# Patient Record
Sex: FEMALE | Race: BLACK OR AFRICAN AMERICAN | NOT HISPANIC OR LATINO | Employment: OTHER | URBAN - METROPOLITAN AREA
[De-identification: names, ages, dates, MRNs, and addresses within clinical notes are randomized per-mention and may not be internally consistent; named-entity substitution may affect disease eponyms.]

---

## 2018-01-30 ENCOUNTER — CONVERSION ENCOUNTER (OUTPATIENT)
Dept: PODIATRY | Facility: CLINIC | Age: 83
End: 2018-01-30

## 2018-01-30 ENCOUNTER — PROCEDURE VISIT CONVERTED (OUTPATIENT)
Dept: PODIATRY | Facility: CLINIC | Age: 83
End: 2018-01-30
Attending: PODIATRIST

## 2018-01-30 ENCOUNTER — OFFICE VISIT CONVERTED (OUTPATIENT)
Dept: PULMONOLOGY | Facility: CLINIC | Age: 83
End: 2018-01-30
Attending: INTERNAL MEDICINE

## 2018-02-13 ENCOUNTER — CONVERSION ENCOUNTER (OUTPATIENT)
Dept: PODIATRY | Facility: CLINIC | Age: 83
End: 2018-02-13

## 2018-02-13 ENCOUNTER — OFFICE VISIT CONVERTED (OUTPATIENT)
Dept: PODIATRY | Facility: CLINIC | Age: 83
End: 2018-02-13
Attending: PODIATRIST

## 2018-02-28 ENCOUNTER — OFFICE VISIT CONVERTED (OUTPATIENT)
Dept: PULMONOLOGY | Facility: CLINIC | Age: 83
End: 2018-02-28
Attending: INTERNAL MEDICINE

## 2018-03-06 ENCOUNTER — PROCEDURE VISIT CONVERTED (OUTPATIENT)
Dept: PODIATRY | Facility: CLINIC | Age: 83
End: 2018-03-06
Attending: PODIATRIST

## 2018-04-13 ENCOUNTER — OFFICE VISIT CONVERTED (OUTPATIENT)
Dept: FAMILY MEDICINE CLINIC | Facility: CLINIC | Age: 83
End: 2018-04-13
Attending: FAMILY MEDICINE

## 2018-04-17 ENCOUNTER — OFFICE VISIT CONVERTED (OUTPATIENT)
Dept: PULMONOLOGY | Facility: CLINIC | Age: 83
End: 2018-04-17
Attending: INTERNAL MEDICINE

## 2018-04-24 ENCOUNTER — PROCEDURE VISIT CONVERTED (OUTPATIENT)
Dept: PODIATRY | Facility: CLINIC | Age: 83
End: 2018-04-24
Attending: PODIATRIST

## 2018-05-14 ENCOUNTER — OFFICE VISIT CONVERTED (OUTPATIENT)
Dept: FAMILY MEDICINE CLINIC | Facility: CLINIC | Age: 83
End: 2018-05-14
Attending: FAMILY MEDICINE

## 2018-05-14 ENCOUNTER — CONVERSION ENCOUNTER (OUTPATIENT)
Dept: FAMILY MEDICINE CLINIC | Facility: CLINIC | Age: 83
End: 2018-05-14

## 2018-05-25 ENCOUNTER — OFFICE VISIT CONVERTED (OUTPATIENT)
Dept: FAMILY MEDICINE CLINIC | Facility: CLINIC | Age: 83
End: 2018-05-25
Attending: FAMILY MEDICINE

## 2018-06-05 ENCOUNTER — PROCEDURE VISIT CONVERTED (OUTPATIENT)
Dept: PODIATRY | Facility: CLINIC | Age: 83
End: 2018-06-05
Attending: PODIATRIST

## 2018-07-19 ENCOUNTER — OFFICE VISIT CONVERTED (OUTPATIENT)
Dept: PULMONOLOGY | Facility: CLINIC | Age: 83
End: 2018-07-19
Attending: INTERNAL MEDICINE

## 2018-07-23 ENCOUNTER — PROCEDURE VISIT CONVERTED (OUTPATIENT)
Dept: PODIATRY | Facility: CLINIC | Age: 83
End: 2018-07-23
Attending: PODIATRIST

## 2018-08-21 ENCOUNTER — OFFICE VISIT CONVERTED (OUTPATIENT)
Dept: FAMILY MEDICINE CLINIC | Facility: CLINIC | Age: 83
End: 2018-08-21
Attending: FAMILY MEDICINE

## 2018-08-27 ENCOUNTER — CONVERSION ENCOUNTER (OUTPATIENT)
Dept: PODIATRY | Facility: CLINIC | Age: 83
End: 2018-08-27

## 2018-08-27 ENCOUNTER — PROCEDURE VISIT CONVERTED (OUTPATIENT)
Dept: PODIATRY | Facility: CLINIC | Age: 83
End: 2018-08-27
Attending: PODIATRIST

## 2018-08-28 ENCOUNTER — OFFICE VISIT CONVERTED (OUTPATIENT)
Dept: FAMILY MEDICINE CLINIC | Facility: CLINIC | Age: 83
End: 2018-08-28
Attending: FAMILY MEDICINE

## 2018-09-14 ENCOUNTER — OFFICE VISIT CONVERTED (OUTPATIENT)
Dept: FAMILY MEDICINE CLINIC | Facility: CLINIC | Age: 83
End: 2018-09-14
Attending: FAMILY MEDICINE

## 2018-10-02 ENCOUNTER — OFFICE VISIT CONVERTED (OUTPATIENT)
Dept: PULMONOLOGY | Facility: CLINIC | Age: 83
End: 2018-10-02
Attending: INTERNAL MEDICINE

## 2018-10-10 ENCOUNTER — CONVERSION ENCOUNTER (OUTPATIENT)
Dept: FAMILY MEDICINE CLINIC | Facility: CLINIC | Age: 83
End: 2018-10-10

## 2018-10-10 ENCOUNTER — OFFICE VISIT CONVERTED (OUTPATIENT)
Dept: FAMILY MEDICINE CLINIC | Facility: CLINIC | Age: 83
End: 2018-10-10
Attending: FAMILY MEDICINE

## 2018-10-15 ENCOUNTER — OFFICE VISIT CONVERTED (OUTPATIENT)
Dept: PODIATRY | Facility: CLINIC | Age: 83
End: 2018-10-15
Attending: PODIATRIST

## 2018-11-09 ENCOUNTER — OFFICE VISIT CONVERTED (OUTPATIENT)
Dept: FAMILY MEDICINE CLINIC | Facility: CLINIC | Age: 83
End: 2018-11-09
Attending: FAMILY MEDICINE

## 2018-11-16 ENCOUNTER — OFFICE VISIT CONVERTED (OUTPATIENT)
Dept: FAMILY MEDICINE CLINIC | Facility: CLINIC | Age: 83
End: 2018-11-16
Attending: FAMILY MEDICINE

## 2018-11-19 ENCOUNTER — PROCEDURE VISIT CONVERTED (OUTPATIENT)
Dept: PODIATRY | Facility: CLINIC | Age: 83
End: 2018-11-19
Attending: PODIATRIST

## 2018-12-10 ENCOUNTER — OFFICE VISIT CONVERTED (OUTPATIENT)
Dept: FAMILY MEDICINE CLINIC | Facility: CLINIC | Age: 83
End: 2018-12-10
Attending: FAMILY MEDICINE

## 2018-12-10 ENCOUNTER — CONVERSION ENCOUNTER (OUTPATIENT)
Dept: FAMILY MEDICINE CLINIC | Facility: CLINIC | Age: 83
End: 2018-12-10

## 2019-01-02 ENCOUNTER — OFFICE VISIT CONVERTED (OUTPATIENT)
Dept: PULMONOLOGY | Facility: CLINIC | Age: 84
End: 2019-01-02
Attending: INTERNAL MEDICINE

## 2019-01-14 ENCOUNTER — HOSPITAL ENCOUNTER (OUTPATIENT)
Dept: FAMILY MEDICINE CLINIC | Facility: CLINIC | Age: 84
Discharge: HOME | End: 2019-01-14

## 2019-01-14 ENCOUNTER — OFFICE VISIT CONVERTED (OUTPATIENT)
Dept: FAMILY MEDICINE CLINIC | Facility: CLINIC | Age: 84
End: 2019-01-14
Attending: FAMILY MEDICINE

## 2019-01-14 ENCOUNTER — CONVERSION ENCOUNTER (OUTPATIENT)
Dept: FAMILY MEDICINE CLINIC | Facility: CLINIC | Age: 84
End: 2019-01-14

## 2019-01-14 LAB
ALBUMIN SERPL-MCNC: 3.6 G/DL (ref 3.5–5)
ALBUMIN/GLOB SERPL: 1.2 {RATIO} (ref 1.4–2.6)
ALP SERPL-CCNC: 82 U/L (ref 38–185)
ALT SERPL-CCNC: 15 U/L (ref 10–40)
ANION GAP SERPL CALC-SCNC: 18 MMOL/L (ref 8–19)
AST SERPL-CCNC: 16 U/L (ref 15–50)
BILIRUB SERPL-MCNC: 0.22 MG/DL (ref 0.2–1.3)
BNP SERPL-MCNC: 1394 PG/ML (ref 0–1800)
BUN SERPL-MCNC: 55 MG/DL (ref 5–25)
BUN/CREAT SERPL: 28 {RATIO} (ref 6–20)
CALCIUM SERPL-MCNC: 9.2 MG/DL (ref 8.7–10.4)
CHLORIDE SERPL-SCNC: 105 MMOL/L (ref 99–111)
CONV CO2: 28 MMOL/L (ref 22–32)
CONV TOTAL PROTEIN: 6.5 G/DL (ref 6.3–8.2)
CREAT UR-MCNC: 1.97 MG/DL (ref 0.5–0.9)
GFR SERPLBLD BASED ON 1.73 SQ M-ARVRAT: 25 ML/MIN/{1.73_M2}
GLOBULIN UR ELPH-MCNC: 2.9 G/DL (ref 2–3.5)
GLUCOSE SERPL-MCNC: 79 MG/DL (ref 65–99)
OSMOLALITY SERPL CALC.SUM OF ELEC: 316 MOSM/KG (ref 273–304)
POTASSIUM SERPL-SCNC: 5 MMOL/L (ref 3.5–5.3)
SODIUM SERPL-SCNC: 146 MMOL/L (ref 135–147)

## 2019-01-17 ENCOUNTER — PROCEDURE VISIT CONVERTED (OUTPATIENT)
Dept: PODIATRY | Facility: CLINIC | Age: 84
End: 2019-01-17
Attending: PODIATRIST

## 2019-01-23 ENCOUNTER — OFFICE VISIT CONVERTED (OUTPATIENT)
Dept: PULMONOLOGY | Facility: CLINIC | Age: 84
End: 2019-01-23
Attending: INTERNAL MEDICINE

## 2019-02-15 ENCOUNTER — OFFICE VISIT CONVERTED (OUTPATIENT)
Dept: FAMILY MEDICINE CLINIC | Facility: CLINIC | Age: 84
End: 2019-02-15
Attending: FAMILY MEDICINE

## 2019-02-15 ENCOUNTER — CONVERSION ENCOUNTER (OUTPATIENT)
Dept: FAMILY MEDICINE CLINIC | Facility: CLINIC | Age: 84
End: 2019-02-15

## 2019-02-18 ENCOUNTER — PROCEDURE VISIT CONVERTED (OUTPATIENT)
Dept: PODIATRY | Facility: CLINIC | Age: 84
End: 2019-02-18
Attending: PODIATRIST

## 2019-02-20 ENCOUNTER — HOSPITAL ENCOUNTER (OUTPATIENT)
Dept: OTHER | Facility: HOSPITAL | Age: 84
Discharge: HOME | End: 2019-02-20
Attending: SPECIALIST

## 2019-02-20 LAB
ANION GAP SERPL CALC-SCNC: 20 MMOL/L (ref 8–19)
BUN SERPL-MCNC: 37 MG/DL (ref 5–25)
BUN/CREAT SERPL: 24 {RATIO} (ref 6–20)
CALCIUM SERPL-MCNC: 9.6 MG/DL (ref 8.7–10.4)
CHLORIDE SERPL-SCNC: 106 MMOL/L (ref 99–111)
CONV CO2: 24 MMOL/L (ref 22–32)
CREAT UR-MCNC: 1.54 MG/DL (ref 0.5–0.9)
GFR SERPLBLD BASED ON 1.73 SQ M-ARVRAT: 33 ML/MIN/{1.73_M2}
GLUCOSE SERPL-MCNC: 131 MG/DL (ref 65–99)
OSMOLALITY SERPL CALC.SUM OF ELEC: 312 MOSM/KG (ref 273–304)
POTASSIUM SERPL-SCNC: 4.4 MMOL/L (ref 3.5–5.3)
SODIUM SERPL-SCNC: 146 MMOL/L (ref 135–147)

## 2019-03-01 ENCOUNTER — OFFICE VISIT CONVERTED (OUTPATIENT)
Dept: FAMILY MEDICINE CLINIC | Facility: CLINIC | Age: 84
End: 2019-03-01
Attending: FAMILY MEDICINE

## 2019-03-15 ENCOUNTER — CONVERSION ENCOUNTER (OUTPATIENT)
Dept: FAMILY MEDICINE CLINIC | Facility: CLINIC | Age: 84
End: 2019-03-15

## 2019-03-15 ENCOUNTER — OFFICE VISIT CONVERTED (OUTPATIENT)
Dept: FAMILY MEDICINE CLINIC | Facility: CLINIC | Age: 84
End: 2019-03-15
Attending: FAMILY MEDICINE

## 2019-03-15 ENCOUNTER — HOSPITAL ENCOUNTER (OUTPATIENT)
Dept: FAMILY MEDICINE CLINIC | Facility: CLINIC | Age: 84
Discharge: HOME | End: 2019-03-15

## 2019-03-15 LAB
ALBUMIN SERPL-MCNC: 4 G/DL (ref 3.5–5)
ALBUMIN/GLOB SERPL: 1.4 {RATIO} (ref 1.4–2.6)
ALP SERPL-CCNC: 94 U/L (ref 38–185)
ALT SERPL-CCNC: 9 U/L (ref 10–40)
ANION GAP SERPL CALC-SCNC: 19 MMOL/L (ref 8–19)
AST SERPL-CCNC: 14 U/L (ref 15–50)
BILIRUB SERPL-MCNC: 0.34 MG/DL (ref 0.2–1.3)
BUN SERPL-MCNC: 46 MG/DL (ref 5–25)
BUN/CREAT SERPL: 29 {RATIO} (ref 6–20)
CALCIUM SERPL-MCNC: 9.5 MG/DL (ref 8.7–10.4)
CHLORIDE SERPL-SCNC: 105 MMOL/L (ref 99–111)
CONV CO2: 26 MMOL/L (ref 22–32)
CONV CREATININE URINE, RANDOM: 51.1 MG/DL (ref 10–300)
CONV MICROALBUM.,U,RANDOM: <12 MG/L (ref 0–20)
CONV TOTAL PROTEIN: 6.9 G/DL (ref 6.3–8.2)
CREAT UR-MCNC: 1.61 MG/DL (ref 0.5–0.9)
GFR SERPLBLD BASED ON 1.73 SQ M-ARVRAT: 31 ML/MIN/{1.73_M2}
GLOBULIN UR ELPH-MCNC: 2.9 G/DL (ref 2–3.5)
GLUCOSE SERPL-MCNC: 127 MG/DL (ref 65–99)
MICROALBUMIN/CREAT UR: 23.5 MG/G{CRE} (ref 0–35)
OSMOLALITY SERPL CALC.SUM OF ELEC: 315 MOSM/KG (ref 273–304)
POTASSIUM SERPL-SCNC: 4.1 MMOL/L (ref 3.5–5.3)
SODIUM SERPL-SCNC: 146 MMOL/L (ref 135–147)

## 2019-03-26 ENCOUNTER — OFFICE VISIT CONVERTED (OUTPATIENT)
Dept: FAMILY MEDICINE CLINIC | Facility: CLINIC | Age: 84
End: 2019-03-26
Attending: FAMILY MEDICINE

## 2019-03-27 ENCOUNTER — OFFICE VISIT CONVERTED (OUTPATIENT)
Dept: PULMONOLOGY | Facility: CLINIC | Age: 84
End: 2019-03-27
Attending: INTERNAL MEDICINE

## 2019-04-01 ENCOUNTER — PROCEDURE VISIT CONVERTED (OUTPATIENT)
Dept: PODIATRY | Facility: CLINIC | Age: 84
End: 2019-04-01
Attending: PODIATRIST

## 2019-04-29 ENCOUNTER — PROCEDURE VISIT CONVERTED (OUTPATIENT)
Dept: PODIATRY | Facility: CLINIC | Age: 84
End: 2019-04-29
Attending: PODIATRIST

## 2019-05-07 ENCOUNTER — OFFICE VISIT CONVERTED (OUTPATIENT)
Dept: FAMILY MEDICINE CLINIC | Facility: CLINIC | Age: 84
End: 2019-05-07
Attending: FAMILY MEDICINE

## 2019-06-03 ENCOUNTER — PROCEDURE VISIT CONVERTED (OUTPATIENT)
Dept: PODIATRY | Facility: CLINIC | Age: 84
End: 2019-06-03
Attending: PODIATRIST

## 2019-06-03 ENCOUNTER — CONVERSION ENCOUNTER (OUTPATIENT)
Dept: PODIATRY | Facility: CLINIC | Age: 84
End: 2019-06-03

## 2019-06-03 ENCOUNTER — OFFICE VISIT CONVERTED (OUTPATIENT)
Dept: FAMILY MEDICINE CLINIC | Facility: CLINIC | Age: 84
End: 2019-06-03
Attending: FAMILY MEDICINE

## 2019-06-12 ENCOUNTER — OFFICE VISIT CONVERTED (OUTPATIENT)
Dept: PULMONOLOGY | Facility: CLINIC | Age: 84
End: 2019-06-12
Attending: INTERNAL MEDICINE

## 2019-07-08 ENCOUNTER — PROCEDURE VISIT CONVERTED (OUTPATIENT)
Dept: PODIATRY | Facility: CLINIC | Age: 84
End: 2019-07-08
Attending: PODIATRIST

## 2019-07-16 ENCOUNTER — CONVERSION ENCOUNTER (OUTPATIENT)
Dept: FAMILY MEDICINE CLINIC | Facility: CLINIC | Age: 84
End: 2019-07-16

## 2019-07-16 ENCOUNTER — OFFICE VISIT CONVERTED (OUTPATIENT)
Dept: FAMILY MEDICINE CLINIC | Facility: CLINIC | Age: 84
End: 2019-07-16
Attending: FAMILY MEDICINE

## 2019-07-16 ENCOUNTER — HOSPITAL ENCOUNTER (OUTPATIENT)
Dept: FAMILY MEDICINE CLINIC | Facility: CLINIC | Age: 84
Discharge: HOME | End: 2019-07-16

## 2019-07-16 LAB
ALBUMIN SERPL-MCNC: 4 G/DL (ref 3.5–5)
ALBUMIN/GLOB SERPL: 1.5 {RATIO} (ref 1.4–2.6)
ALP SERPL-CCNC: 97 U/L (ref 38–185)
ALT SERPL-CCNC: 12 U/L (ref 10–40)
ANION GAP SERPL CALC-SCNC: 25 MMOL/L (ref 8–19)
APPEARANCE UR: CLEAR
AST SERPL-CCNC: 17 U/L (ref 15–50)
BILIRUB SERPL-MCNC: 0.51 MG/DL (ref 0.2–1.3)
BILIRUB UR QL: NEGATIVE
BUN SERPL-MCNC: 53 MG/DL (ref 5–25)
BUN/CREAT SERPL: 32 {RATIO} (ref 6–20)
CALCIUM SERPL-MCNC: 9.2 MG/DL (ref 8.7–10.4)
CHLORIDE SERPL-SCNC: 105 MMOL/L (ref 99–111)
COLOR UR: YELLOW
CONV BACTERIA: NEGATIVE
CONV CO2: 21 MMOL/L (ref 22–32)
CONV COLLECTION SOURCE (UA): ABNORMAL
CONV TOTAL PROTEIN: 6.7 G/DL (ref 6.3–8.2)
CONV UROBILINOGEN IN URINE BY AUTOMATED TEST STRIP: 0.2 {EHRLICHU}/DL (ref 0.1–1)
CREAT UR-MCNC: 1.67 MG/DL (ref 0.5–0.9)
EST. AVERAGE GLUCOSE BLD GHB EST-MCNC: 146 MG/DL
GFR SERPLBLD BASED ON 1.73 SQ M-ARVRAT: 30 ML/MIN/{1.73_M2}
GLOBULIN UR ELPH-MCNC: 2.7 G/DL (ref 2–3.5)
GLUCOSE SERPL-MCNC: 213 MG/DL (ref 65–99)
GLUCOSE UR QL: NEGATIVE MG/DL
HBA1C MFR BLD: 6.7 % (ref 3.5–5.7)
HGB UR QL STRIP: NEGATIVE
KETONES UR QL STRIP: NEGATIVE MG/DL
LEUKOCYTE ESTERASE UR QL STRIP: ABNORMAL
NITRITE UR QL STRIP: NEGATIVE
OSMOLALITY SERPL CALC.SUM OF ELEC: 325 MOSM/KG (ref 273–304)
PH UR STRIP.AUTO: 5 [PH] (ref 5–8)
POTASSIUM SERPL-SCNC: 4 MMOL/L (ref 3.5–5.3)
PROT UR QL: NEGATIVE MG/DL
RBC #/AREA URNS HPF: ABNORMAL /[HPF]
SODIUM SERPL-SCNC: 147 MMOL/L (ref 135–147)
SP GR UR: 1.02 (ref 1–1.03)
SQUAMOUS SPT QL MICRO: ABNORMAL /[HPF]
WBC #/AREA URNS HPF: ABNORMAL /[HPF]

## 2019-08-12 ENCOUNTER — PROCEDURE VISIT CONVERTED (OUTPATIENT)
Dept: PODIATRY | Facility: CLINIC | Age: 84
End: 2019-08-12
Attending: PODIATRIST

## 2019-08-19 ENCOUNTER — HOSPITAL ENCOUNTER (OUTPATIENT)
Dept: FAMILY MEDICINE CLINIC | Facility: CLINIC | Age: 84
Discharge: HOME | End: 2019-08-19

## 2019-08-19 ENCOUNTER — OFFICE VISIT CONVERTED (OUTPATIENT)
Dept: FAMILY MEDICINE CLINIC | Facility: CLINIC | Age: 84
End: 2019-08-19
Attending: FAMILY MEDICINE

## 2019-08-19 LAB
ALBUMIN SERPL-MCNC: 3.9 G/DL (ref 3.5–5)
ALBUMIN/GLOB SERPL: 1.5 {RATIO} (ref 1.4–2.6)
ALP SERPL-CCNC: 88 U/L (ref 38–185)
ALT SERPL-CCNC: 10 U/L (ref 10–40)
ANION GAP SERPL CALC-SCNC: 18 MMOL/L (ref 8–19)
AST SERPL-CCNC: 15 U/L (ref 15–50)
BILIRUB SERPL-MCNC: 0.25 MG/DL (ref 0.2–1.3)
BUN SERPL-MCNC: 63 MG/DL (ref 5–25)
BUN/CREAT SERPL: 40 {RATIO} (ref 6–20)
CALCIUM SERPL-MCNC: 8.9 MG/DL (ref 8.7–10.4)
CHLORIDE SERPL-SCNC: 105 MMOL/L (ref 99–111)
CHOLEST SERPL-MCNC: 183 MG/DL (ref 107–200)
CHOLEST/HDLC SERPL: 3.1 {RATIO} (ref 3–6)
CONV CO2: 24 MMOL/L (ref 22–32)
CONV TOTAL PROTEIN: 6.5 G/DL (ref 6.3–8.2)
CREAT UR-MCNC: 1.57 MG/DL (ref 0.5–0.9)
GFR SERPLBLD BASED ON 1.73 SQ M-ARVRAT: 32 ML/MIN/{1.73_M2}
GLOBULIN UR ELPH-MCNC: 2.6 G/DL (ref 2–3.5)
GLUCOSE SERPL-MCNC: 82 MG/DL (ref 65–99)
HDLC SERPL-MCNC: 60 MG/DL (ref 40–60)
LDLC SERPL CALC-MCNC: 102 MG/DL (ref 70–100)
OSMOLALITY SERPL CALC.SUM OF ELEC: 313 MOSM/KG (ref 273–304)
POTASSIUM SERPL-SCNC: 4.1 MMOL/L (ref 3.5–5.3)
SODIUM SERPL-SCNC: 143 MMOL/L (ref 135–147)
TRIGL SERPL-MCNC: 104 MG/DL (ref 40–150)
URATE SERPL-MCNC: 4.2 MG/DL (ref 2.5–7.5)
VLDLC SERPL-MCNC: 21 MG/DL (ref 5–37)

## 2019-09-09 ENCOUNTER — PROCEDURE VISIT CONVERTED (OUTPATIENT)
Dept: PODIATRY | Facility: CLINIC | Age: 84
End: 2019-09-09
Attending: PODIATRIST

## 2019-09-27 ENCOUNTER — OFFICE VISIT CONVERTED (OUTPATIENT)
Dept: FAMILY MEDICINE CLINIC | Facility: CLINIC | Age: 84
End: 2019-09-27
Attending: FAMILY MEDICINE

## 2019-10-28 ENCOUNTER — CONVERSION ENCOUNTER (OUTPATIENT)
Dept: PODIATRY | Facility: CLINIC | Age: 84
End: 2019-10-28

## 2019-10-28 ENCOUNTER — PROCEDURE VISIT CONVERTED (OUTPATIENT)
Dept: PODIATRY | Facility: CLINIC | Age: 84
End: 2019-10-28
Attending: PODIATRIST

## 2019-10-30 ENCOUNTER — CONVERSION ENCOUNTER (OUTPATIENT)
Dept: FAMILY MEDICINE CLINIC | Facility: CLINIC | Age: 84
End: 2019-10-30

## 2019-10-30 ENCOUNTER — OFFICE VISIT CONVERTED (OUTPATIENT)
Dept: FAMILY MEDICINE CLINIC | Facility: CLINIC | Age: 84
End: 2019-10-30
Attending: FAMILY MEDICINE

## 2019-10-30 ENCOUNTER — OFFICE VISIT CONVERTED (OUTPATIENT)
Dept: PULMONOLOGY | Facility: CLINIC | Age: 84
End: 2019-10-30
Attending: INTERNAL MEDICINE

## 2019-10-30 ENCOUNTER — HOSPITAL ENCOUNTER (OUTPATIENT)
Dept: FAMILY MEDICINE CLINIC | Facility: CLINIC | Age: 84
Discharge: HOME | End: 2019-10-30
Attending: FAMILY MEDICINE

## 2019-10-30 LAB
EST. AVERAGE GLUCOSE BLD GHB EST-MCNC: 140 MG/DL
HBA1C MFR BLD: 6.5 % (ref 3.5–5.7)

## 2020-03-12 ENCOUNTER — PROCEDURE VISIT CONVERTED (OUTPATIENT)
Dept: PODIATRY | Facility: CLINIC | Age: 85
End: 2020-03-12
Attending: PODIATRIST

## 2020-03-12 ENCOUNTER — CONVERSION ENCOUNTER (OUTPATIENT)
Dept: PODIATRY | Facility: CLINIC | Age: 85
End: 2020-03-12

## 2020-03-16 ENCOUNTER — OFFICE VISIT CONVERTED (OUTPATIENT)
Dept: FAMILY MEDICINE CLINIC | Facility: CLINIC | Age: 85
End: 2020-03-16
Attending: FAMILY MEDICINE

## 2020-03-16 ENCOUNTER — HOSPITAL ENCOUNTER (OUTPATIENT)
Dept: FAMILY MEDICINE CLINIC | Facility: CLINIC | Age: 85
Discharge: HOME | End: 2020-03-16
Attending: FAMILY MEDICINE

## 2020-03-17 ENCOUNTER — HOSPITAL ENCOUNTER (OUTPATIENT)
Dept: FAMILY MEDICINE CLINIC | Facility: CLINIC | Age: 85
Discharge: HOME | End: 2020-03-17
Attending: FAMILY MEDICINE

## 2020-03-17 LAB
ALBUMIN SERPL-MCNC: 3.8 G/DL (ref 3.5–5)
ALBUMIN/GLOB SERPL: 1.4 {RATIO} (ref 1.4–2.6)
ALP SERPL-CCNC: 104 U/L (ref 38–185)
ALT SERPL-CCNC: 15 U/L (ref 10–40)
ANION GAP SERPL CALC-SCNC: 17 MMOL/L (ref 8–19)
AST SERPL-CCNC: 23 U/L (ref 15–50)
BASOPHILS # BLD AUTO: 0.02 10*3/UL (ref 0–0.2)
BASOPHILS # BLD: 0 % (ref 0–3)
BASOPHILS NFR BLD AUTO: 0.4 % (ref 0–3)
BILIRUB SERPL-MCNC: 0.39 MG/DL (ref 0.2–1.3)
BUN SERPL-MCNC: 56 MG/DL (ref 5–25)
BUN/CREAT SERPL: 29 {RATIO} (ref 6–20)
CALCIUM SERPL-MCNC: 9.7 MG/DL (ref 8.7–10.4)
CHLORIDE SERPL-SCNC: 104 MMOL/L (ref 99–111)
CHOLEST SERPL-MCNC: 185 MG/DL (ref 107–200)
CHOLEST/HDLC SERPL: 3.2 {RATIO} (ref 3–6)
CONV ABS BANDS: 1 % (ref 1–5)
CONV ABS IMM GRAN: 0.01 10*3/UL (ref 0–0.2)
CONV ANISOCYTES: SLIGHT
CONV ATYPICAL LYMPHOCYTES: 16 % (ref 0–5)
CONV CO2: 25 MMOL/L (ref 22–32)
CONV CREATININE URINE, RANDOM: 67.1 MG/DL (ref 10–300)
CONV IMMATURE GRAN: 0.2 % (ref 0–1.8)
CONV MICROALBUM.,U,RANDOM: <12 MG/L (ref 0–20)
CONV SEGMENTED NEUTROPHILS: 64 % (ref 45–70)
CONV TOTAL PROTEIN: 6.6 G/DL (ref 6.3–8.2)
CREAT UR-MCNC: 1.92 MG/DL (ref 0.5–0.9)
DEPRECATED RDW RBC AUTO: 54.4 FL (ref 36.4–46.3)
EOSINOPHIL # BLD AUTO: 0.12 10*3/UL (ref 0–0.7)
EOSINOPHIL # BLD AUTO: 2.4 % (ref 0–7)
EOSINOPHIL NFR BLD AUTO: 3 % (ref 0–7)
ERYTHROCYTE [DISTWIDTH] IN BLOOD BY AUTOMATED COUNT: 16.4 % (ref 11.7–14.4)
EST. AVERAGE GLUCOSE BLD GHB EST-MCNC: 157 MG/DL
GFR SERPLBLD BASED ON 1.73 SQ M-ARVRAT: 25 ML/MIN/{1.73_M2}
GLOBULIN UR ELPH-MCNC: 2.8 G/DL (ref 2–3.5)
GLUCOSE SERPL-MCNC: 69 MG/DL (ref 65–99)
HBA1C MFR BLD: 7.1 % (ref 3.5–5.7)
HCT VFR BLD AUTO: 46.2 % (ref 37–47)
HDLC SERPL-MCNC: 57 MG/DL (ref 40–60)
HGB BLD-MCNC: 14.2 G/DL (ref 12–16)
LDLC SERPL CALC-MCNC: 108 MG/DL (ref 70–100)
LYMPHOCYTES # BLD AUTO: 1.3 10*3/UL (ref 1–5)
LYMPHOCYTES NFR BLD AUTO: 25.8 % (ref 20–45)
MCH RBC QN AUTO: 27.6 PG (ref 27–31)
MCHC RBC AUTO-ENTMCNC: 30.7 G/DL (ref 33–37)
MCV RBC AUTO: 89.7 FL (ref 81–99)
MICROALBUMIN/CREAT UR: 17.9 MG/G{CRE} (ref 0–35)
MICROCYTES BLD QL: SLIGHT
MONOCYTES # BLD AUTO: 0.52 10*3/UL (ref 0.2–1.2)
MONOCYTES NFR BLD AUTO: 10.3 % (ref 3–10)
MONOCYTES NFR BLD MANUAL: 8 % (ref 3–10)
NEUTROPHILS # BLD AUTO: 3.06 10*3/UL (ref 2–8)
NEUTROPHILS NFR BLD AUTO: 60.9 % (ref 30–85)
NEUTS VAC BLD QL SMEAR: ABNORMAL
NRBC CBCN: 0 % (ref 0–0.7)
NUC CELL # PRT MANUAL: 0 /100{WBCS}
OSMOLALITY SERPL CALC.SUM OF ELEC: 308 MOSM/KG (ref 273–304)
OVALOCYTES BLD QL SMEAR: ABNORMAL
PLAT MORPH BLD: NORMAL
PLATELET # BLD AUTO: 212 10*3/UL (ref 130–400)
PMV BLD AUTO: 11.4 FL (ref 9.4–12.3)
POIKILOCYTOSIS BLD QL SMEAR: SLIGHT
POLYCHROMASIA BLD QL SMEAR: ABNORMAL
POTASSIUM SERPL-SCNC: 4 MMOL/L (ref 3.5–5.3)
RBC # BLD AUTO: 5.15 10*6/UL (ref 4.2–5.4)
SMALL PLATELETS BLD QL SMEAR: ADEQUATE
SODIUM SERPL-SCNC: 142 MMOL/L (ref 135–147)
TRIGL SERPL-MCNC: 102 MG/DL (ref 40–150)
VARIANT LYMPHS NFR BLD MANUAL: 8 % (ref 20–45)
VLDLC SERPL-MCNC: 20 MG/DL (ref 5–37)
WBC # BLD AUTO: 5.03 10*3/UL (ref 4.8–10.8)

## 2020-04-15 ENCOUNTER — OFFICE VISIT CONVERTED (OUTPATIENT)
Dept: PULMONOLOGY | Facility: CLINIC | Age: 85
End: 2020-04-15
Attending: INTERNAL MEDICINE

## 2020-04-29 ENCOUNTER — TELEPHONE CONVERTED (OUTPATIENT)
Dept: FAMILY MEDICINE CLINIC | Facility: CLINIC | Age: 85
End: 2020-04-29
Attending: FAMILY MEDICINE

## 2020-05-04 ENCOUNTER — OFFICE VISIT CONVERTED (OUTPATIENT)
Dept: PULMONOLOGY | Facility: CLINIC | Age: 85
End: 2020-05-04
Attending: NURSE PRACTITIONER

## 2020-05-04 ENCOUNTER — CONVERSION ENCOUNTER (OUTPATIENT)
Dept: OTHER | Facility: HOSPITAL | Age: 85
End: 2020-05-04

## 2020-05-04 ENCOUNTER — HOSPITAL ENCOUNTER (OUTPATIENT)
Dept: OTHER | Facility: HOSPITAL | Age: 85
Discharge: HOME | End: 2020-05-04

## 2020-05-05 LAB — SARS-COV-2 RNA SPEC QL NAA+PROBE: NOT DETECTED

## 2020-05-06 ENCOUNTER — HOSPITAL ENCOUNTER (OUTPATIENT)
Dept: GENERAL RADIOLOGY | Facility: HOSPITAL | Age: 85
Discharge: HOME | End: 2020-05-06
Attending: INTERNAL MEDICINE

## 2020-05-12 ENCOUNTER — OFFICE VISIT CONVERTED (OUTPATIENT)
Dept: PULMONOLOGY | Facility: CLINIC | Age: 85
End: 2020-05-12
Attending: NURSE PRACTITIONER

## 2020-06-12 ENCOUNTER — OFFICE VISIT CONVERTED (OUTPATIENT)
Dept: FAMILY MEDICINE CLINIC | Facility: CLINIC | Age: 85
End: 2020-06-12
Attending: FAMILY MEDICINE

## 2020-06-17 ENCOUNTER — OFFICE VISIT CONVERTED (OUTPATIENT)
Dept: FAMILY MEDICINE CLINIC | Facility: CLINIC | Age: 85
End: 2020-06-17
Attending: FAMILY MEDICINE

## 2020-06-19 ENCOUNTER — TELEMEDICINE CONVERTED (OUTPATIENT)
Dept: FAMILY MEDICINE CLINIC | Facility: CLINIC | Age: 85
End: 2020-06-19
Attending: FAMILY MEDICINE

## 2020-06-22 ENCOUNTER — TELEPHONE CONVERTED (OUTPATIENT)
Dept: FAMILY MEDICINE CLINIC | Facility: CLINIC | Age: 85
End: 2020-06-22
Attending: FAMILY MEDICINE

## 2020-06-26 ENCOUNTER — TELEPHONE CONVERTED (OUTPATIENT)
Dept: FAMILY MEDICINE CLINIC | Facility: CLINIC | Age: 85
End: 2020-06-26
Attending: FAMILY MEDICINE

## 2020-07-06 ENCOUNTER — OFFICE VISIT CONVERTED (OUTPATIENT)
Dept: FAMILY MEDICINE CLINIC | Facility: CLINIC | Age: 85
End: 2020-07-06
Attending: FAMILY MEDICINE

## 2020-07-10 ENCOUNTER — HOSPITAL ENCOUNTER (OUTPATIENT)
Dept: OTHER | Facility: HOSPITAL | Age: 85
Discharge: HOME | End: 2020-07-10
Attending: FAMILY MEDICINE

## 2020-07-10 LAB
APPEARANCE UR: CLEAR
BILIRUB UR QL: ABNORMAL
COLOR UR: YELLOW
CONV COLLECTION SOURCE (UA): ABNORMAL
CONV UROBILINOGEN IN URINE BY AUTOMATED TEST STRIP: 0.2 {EHRLICHU}/DL (ref 0.1–1)
GLUCOSE UR QL: NEGATIVE MG/DL
HGB UR QL STRIP: NEGATIVE
KETONES UR QL STRIP: ABNORMAL MG/DL
LEUKOCYTE ESTERASE UR QL STRIP: NEGATIVE
NITRITE UR QL STRIP: NEGATIVE
PH UR STRIP.AUTO: 5 [PH] (ref 5–8)
PROT UR QL: ABNORMAL MG/DL
SP GR UR: 1.02 (ref 1–1.03)

## 2020-07-12 LAB — BACTERIA UR CULT: NORMAL

## 2020-07-13 ENCOUNTER — TELEPHONE CONVERTED (OUTPATIENT)
Dept: FAMILY MEDICINE CLINIC | Facility: CLINIC | Age: 85
End: 2020-07-13
Attending: FAMILY MEDICINE

## 2020-07-15 ENCOUNTER — OFFICE VISIT CONVERTED (OUTPATIENT)
Dept: PULMONOLOGY | Facility: CLINIC | Age: 85
End: 2020-07-15
Attending: INTERNAL MEDICINE

## 2020-07-20 ENCOUNTER — PROCEDURE VISIT CONVERTED (OUTPATIENT)
Dept: PODIATRY | Facility: CLINIC | Age: 85
End: 2020-07-20
Attending: PODIATRIST

## 2020-07-22 ENCOUNTER — CONVERSION ENCOUNTER (OUTPATIENT)
Dept: CARDIOLOGY | Facility: CLINIC | Age: 85
End: 2020-07-22

## 2020-07-22 ENCOUNTER — OFFICE VISIT CONVERTED (OUTPATIENT)
Dept: CARDIOLOGY | Facility: CLINIC | Age: 85
End: 2020-07-22
Attending: INTERNAL MEDICINE

## 2020-08-17 ENCOUNTER — HOSPITAL ENCOUNTER (OUTPATIENT)
Dept: INFUSION THERAPY | Facility: HOSPITAL | Age: 85
Discharge: HOME | End: 2020-08-17
Attending: INTERNAL MEDICINE

## 2020-08-17 LAB
ALBUMIN SERPL-MCNC: 3.1 G/DL (ref 3.5–5)
ALBUMIN/GLOB SERPL: 1 {RATIO} (ref 1.4–2.6)
ALP SERPL-CCNC: 93 U/L (ref 38–185)
ALT SERPL-CCNC: 6 U/L (ref 10–40)
ANION GAP SERPL CALC-SCNC: 20 MMOL/L (ref 8–19)
AST SERPL-CCNC: 18 U/L (ref 15–50)
BASOPHILS # BLD AUTO: 0.03 10*3/UL (ref 0–0.2)
BASOPHILS NFR BLD AUTO: 0.5 % (ref 0–3)
BILIRUB SERPL-MCNC: 0.23 MG/DL (ref 0.2–1.3)
BUN SERPL-MCNC: 47 MG/DL (ref 5–25)
BUN/CREAT SERPL: 23 {RATIO} (ref 6–20)
CALCIUM SERPL-MCNC: 10.1 MG/DL (ref 8.7–10.4)
CHLORIDE SERPL-SCNC: 108 MMOL/L (ref 99–111)
CONV ABS IMM GRAN: 0.02 10*3/UL (ref 0–0.2)
CONV CO2: 19 MMOL/L (ref 22–32)
CONV IMMATURE GRAN: 0.3 % (ref 0–1.8)
CONV TOTAL PROTEIN: 6.3 G/DL (ref 6.3–8.2)
CREAT UR-MCNC: 2.03 MG/DL (ref 0.5–0.9)
DEPRECATED RDW RBC AUTO: 55.1 FL (ref 36.4–46.3)
EOSINOPHIL # BLD AUTO: 0.1 10*3/UL (ref 0–0.7)
EOSINOPHIL # BLD AUTO: 1.6 % (ref 0–7)
ERYTHROCYTE [DISTWIDTH] IN BLOOD BY AUTOMATED COUNT: 16.3 % (ref 11.7–14.4)
GFR SERPLBLD BASED ON 1.73 SQ M-ARVRAT: 23 ML/MIN/{1.73_M2}
GLOBULIN UR ELPH-MCNC: 3.2 G/DL (ref 2–3.5)
GLUCOSE SERPL-MCNC: 99 MG/DL (ref 65–99)
HCT VFR BLD AUTO: 39 % (ref 37–47)
HGB BLD-MCNC: 11.9 G/DL (ref 12–16)
LYMPHOCYTES # BLD AUTO: 2.01 10*3/UL (ref 1–5)
LYMPHOCYTES NFR BLD AUTO: 32.3 % (ref 20–45)
MCH RBC QN AUTO: 28 PG (ref 27–31)
MCHC RBC AUTO-ENTMCNC: 30.5 G/DL (ref 33–37)
MCV RBC AUTO: 91.8 FL (ref 81–99)
MONOCYTES # BLD AUTO: 0.53 10*3/UL (ref 0.2–1.2)
MONOCYTES NFR BLD AUTO: 8.5 % (ref 3–10)
NEUTROPHILS # BLD AUTO: 3.53 10*3/UL (ref 2–8)
NEUTROPHILS NFR BLD AUTO: 56.8 % (ref 30–85)
NRBC CBCN: 0 % (ref 0–0.7)
OSMOLALITY SERPL CALC.SUM OF ELEC: 306 MOSM/KG (ref 273–304)
PLATELET # BLD AUTO: 252 10*3/UL (ref 130–400)
PMV BLD AUTO: 10.4 FL (ref 9.4–12.3)
POTASSIUM SERPL-SCNC: 4.7 MMOL/L (ref 3.5–5.3)
RBC # BLD AUTO: 4.25 10*6/UL (ref 4.2–5.4)
SODIUM SERPL-SCNC: 142 MMOL/L (ref 135–147)
WBC # BLD AUTO: 6.22 10*3/UL (ref 4.8–10.8)

## 2020-08-21 ENCOUNTER — OFFICE VISIT CONVERTED (OUTPATIENT)
Dept: FAMILY MEDICINE CLINIC | Facility: CLINIC | Age: 85
End: 2020-08-21
Attending: FAMILY MEDICINE

## 2020-08-24 ENCOUNTER — PROCEDURE VISIT CONVERTED (OUTPATIENT)
Dept: PODIATRY | Facility: CLINIC | Age: 85
End: 2020-08-24
Attending: PODIATRIST

## 2020-09-09 ENCOUNTER — OFFICE VISIT CONVERTED (OUTPATIENT)
Dept: PULMONOLOGY | Facility: CLINIC | Age: 85
End: 2020-09-09
Attending: INTERNAL MEDICINE

## 2020-09-28 ENCOUNTER — PROCEDURE VISIT CONVERTED (OUTPATIENT)
Dept: PODIATRY | Facility: CLINIC | Age: 85
End: 2020-09-28
Attending: PODIATRIST

## 2020-10-07 ENCOUNTER — HOSPITAL ENCOUNTER (OUTPATIENT)
Dept: FAMILY MEDICINE CLINIC | Facility: CLINIC | Age: 85
Discharge: HOME | End: 2020-10-07
Attending: INTERNAL MEDICINE

## 2020-10-07 LAB
ALBUMIN SERPL-MCNC: 3.4 G/DL (ref 3.5–5)
ALBUMIN/GLOB SERPL: 1.3 {RATIO} (ref 1.4–2.6)
ALP SERPL-CCNC: 108 U/L (ref 38–185)
ALT SERPL-CCNC: 6 U/L (ref 10–40)
ANION GAP SERPL CALC-SCNC: 18 MMOL/L (ref 8–19)
AST SERPL-CCNC: 17 U/L (ref 15–50)
BASOPHILS # BLD AUTO: 0.03 10*3/UL (ref 0–0.2)
BASOPHILS NFR BLD AUTO: 0.6 % (ref 0–3)
BILIRUB SERPL-MCNC: 0.23 MG/DL (ref 0.2–1.3)
BUN SERPL-MCNC: 41 MG/DL (ref 5–25)
BUN/CREAT SERPL: 23 {RATIO} (ref 6–20)
CALCIUM SERPL-MCNC: 9.6 MG/DL (ref 8.7–10.4)
CHLORIDE SERPL-SCNC: 102 MMOL/L (ref 99–111)
CONV ABS IMM GRAN: 0.02 10*3/UL (ref 0–0.2)
CONV CO2: 28 MMOL/L (ref 22–32)
CONV IMMATURE GRAN: 0.4 % (ref 0–1.8)
CONV TOTAL PROTEIN: 6.1 G/DL (ref 6.3–8.2)
CREAT UR-MCNC: 1.79 MG/DL (ref 0.5–0.9)
DEPRECATED RDW RBC AUTO: 53.6 FL (ref 36.4–46.3)
EOSINOPHIL # BLD AUTO: 0.17 10*3/UL (ref 0–0.7)
EOSINOPHIL # BLD AUTO: 3.5 % (ref 0–7)
ERYTHROCYTE [DISTWIDTH] IN BLOOD BY AUTOMATED COUNT: 16.4 % (ref 11.7–14.4)
GFR SERPLBLD BASED ON 1.73 SQ M-ARVRAT: 27 ML/MIN/{1.73_M2}
GLOBULIN UR ELPH-MCNC: 2.7 G/DL (ref 2–3.5)
GLUCOSE SERPL-MCNC: 108 MG/DL (ref 65–99)
HCT VFR BLD AUTO: 38.7 % (ref 37–47)
HGB BLD-MCNC: 11.6 G/DL (ref 12–16)
LYMPHOCYTES # BLD AUTO: 2.22 10*3/UL (ref 1–5)
LYMPHOCYTES NFR BLD AUTO: 45.2 % (ref 20–45)
MCH RBC QN AUTO: 26.9 PG (ref 27–31)
MCHC RBC AUTO-ENTMCNC: 30 G/DL (ref 33–37)
MCV RBC AUTO: 89.6 FL (ref 81–99)
MONOCYTES # BLD AUTO: 0.41 10*3/UL (ref 0.2–1.2)
MONOCYTES NFR BLD AUTO: 8.4 % (ref 3–10)
NEUTROPHILS # BLD AUTO: 2.06 10*3/UL (ref 2–8)
NEUTROPHILS NFR BLD AUTO: 41.9 % (ref 30–85)
NRBC CBCN: 0 % (ref 0–0.7)
OSMOLALITY SERPL CALC.SUM OF ELEC: 309 MOSM/KG (ref 273–304)
PLATELET # BLD AUTO: 315 10*3/UL (ref 130–400)
PMV BLD AUTO: 10.8 FL (ref 9.4–12.3)
POTASSIUM SERPL-SCNC: 4 MMOL/L (ref 3.5–5.3)
RBC # BLD AUTO: 4.32 10*6/UL (ref 4.2–5.4)
SODIUM SERPL-SCNC: 144 MMOL/L (ref 135–147)
WBC # BLD AUTO: 4.91 10*3/UL (ref 4.8–10.8)

## 2020-11-02 ENCOUNTER — CONVERSION ENCOUNTER (OUTPATIENT)
Dept: PODIATRY | Facility: CLINIC | Age: 85
End: 2020-11-02

## 2020-11-02 ENCOUNTER — PROCEDURE VISIT CONVERTED (OUTPATIENT)
Dept: PODIATRY | Facility: CLINIC | Age: 85
End: 2020-11-02
Attending: PODIATRIST

## 2020-11-23 ENCOUNTER — HOSPITAL ENCOUNTER (OUTPATIENT)
Dept: FAMILY MEDICINE CLINIC | Facility: CLINIC | Age: 85
Discharge: HOME | End: 2020-11-23
Attending: INTERNAL MEDICINE

## 2020-11-23 LAB
ALBUMIN SERPL-MCNC: 4 G/DL (ref 3.5–5)
ALBUMIN/GLOB SERPL: 1.5 {RATIO} (ref 1.4–2.6)
ALP SERPL-CCNC: 103 U/L (ref 38–185)
ALT SERPL-CCNC: 7 U/L (ref 10–40)
ANION GAP SERPL CALC-SCNC: 20 MMOL/L (ref 8–19)
AST SERPL-CCNC: 18 U/L (ref 15–50)
BASOPHILS # BLD AUTO: 0.02 10*3/UL (ref 0–0.2)
BASOPHILS NFR BLD AUTO: 0.3 % (ref 0–3)
BILIRUB SERPL-MCNC: 0.33 MG/DL (ref 0.2–1.3)
BUN SERPL-MCNC: 61 MG/DL (ref 5–25)
BUN/CREAT SERPL: 31 {RATIO} (ref 6–20)
CALCIUM SERPL-MCNC: 10.1 MG/DL (ref 8.7–10.4)
CHLORIDE SERPL-SCNC: 102 MMOL/L (ref 99–111)
CONV ABS IMM GRAN: 0.01 10*3/UL (ref 0–0.2)
CONV CO2: 25 MMOL/L (ref 22–32)
CONV IMMATURE GRAN: 0.2 % (ref 0–1.8)
CONV TOTAL PROTEIN: 6.7 G/DL (ref 6.3–8.2)
CREAT UR-MCNC: 1.98 MG/DL (ref 0.5–0.9)
DEPRECATED RDW RBC AUTO: 53.5 FL (ref 36.4–46.3)
EOSINOPHIL # BLD AUTO: 0.14 10*3/UL (ref 0–0.7)
EOSINOPHIL # BLD AUTO: 2.2 % (ref 0–7)
ERYTHROCYTE [DISTWIDTH] IN BLOOD BY AUTOMATED COUNT: 16.7 % (ref 11.7–14.4)
GFR SERPLBLD BASED ON 1.73 SQ M-ARVRAT: 24 ML/MIN/{1.73_M2}
GLOBULIN UR ELPH-MCNC: 2.7 G/DL (ref 2–3.5)
GLUCOSE SERPL-MCNC: 115 MG/DL (ref 65–99)
HCT VFR BLD AUTO: 39.3 % (ref 37–47)
HGB BLD-MCNC: 12.1 G/DL (ref 12–16)
LYMPHOCYTES # BLD AUTO: 2.62 10*3/UL (ref 1–5)
LYMPHOCYTES NFR BLD AUTO: 42 % (ref 20–45)
MCH RBC QN AUTO: 26.9 PG (ref 27–31)
MCHC RBC AUTO-ENTMCNC: 30.8 G/DL (ref 33–37)
MCV RBC AUTO: 87.5 FL (ref 81–99)
MONOCYTES # BLD AUTO: 0.63 10*3/UL (ref 0.2–1.2)
MONOCYTES NFR BLD AUTO: 10.1 % (ref 3–10)
NEUTROPHILS # BLD AUTO: 2.82 10*3/UL (ref 2–8)
NEUTROPHILS NFR BLD AUTO: 45.2 % (ref 30–85)
NRBC CBCN: 0 % (ref 0–0.7)
OSMOLALITY SERPL CALC.SUM OF ELEC: 312 MOSM/KG (ref 273–304)
PLATELET # BLD AUTO: 174 10*3/UL (ref 130–400)
PMV BLD AUTO: 10.9 FL (ref 9.4–12.3)
POTASSIUM SERPL-SCNC: 4.7 MMOL/L (ref 3.5–5.3)
RBC # BLD AUTO: 4.49 10*6/UL (ref 4.2–5.4)
SODIUM SERPL-SCNC: 142 MMOL/L (ref 135–147)
WBC # BLD AUTO: 6.24 10*3/UL (ref 4.8–10.8)

## 2020-12-08 ENCOUNTER — OFFICE VISIT CONVERTED (OUTPATIENT)
Dept: PULMONOLOGY | Facility: CLINIC | Age: 85
End: 2020-12-08
Attending: NURSE PRACTITIONER

## 2020-12-14 ENCOUNTER — PROCEDURE VISIT CONVERTED (OUTPATIENT)
Dept: PODIATRY | Facility: CLINIC | Age: 85
End: 2020-12-14
Attending: PODIATRIST

## 2020-12-15 ENCOUNTER — OFFICE VISIT CONVERTED (OUTPATIENT)
Dept: FAMILY MEDICINE CLINIC | Facility: CLINIC | Age: 85
End: 2020-12-15
Attending: FAMILY MEDICINE

## 2020-12-15 LAB — CONV FLOW RATE: 2

## 2021-01-01 ENCOUNTER — LAB (OUTPATIENT)
Dept: LAB | Facility: HOSPITAL | Age: 86
End: 2021-01-01

## 2021-01-01 ENCOUNTER — HOSPITAL ENCOUNTER (OUTPATIENT)
Dept: INFUSION THERAPY | Facility: HOSPITAL | Age: 86
Discharge: HOME | End: 2021-03-09
Attending: NURSE PRACTITIONER

## 2021-01-01 ENCOUNTER — HOSPITAL ENCOUNTER (OUTPATIENT)
Dept: OTHER | Facility: HOSPITAL | Age: 86
Discharge: HOME | End: 2021-05-26
Attending: FAMILY MEDICINE

## 2021-01-01 ENCOUNTER — OFFICE VISIT (OUTPATIENT)
Dept: PODIATRY | Facility: CLINIC | Age: 86
End: 2021-01-01

## 2021-01-01 ENCOUNTER — OFFICE VISIT (OUTPATIENT)
Dept: FAMILY MEDICINE CLINIC | Facility: CLINIC | Age: 86
End: 2021-01-01

## 2021-01-01 ENCOUNTER — PROCEDURE VISIT CONVERTED (OUTPATIENT)
Dept: PODIATRY | Facility: CLINIC | Age: 86
End: 2021-01-01
Attending: PODIATRIST

## 2021-01-01 ENCOUNTER — OFFICE VISIT (OUTPATIENT)
Dept: PULMONOLOGY | Facility: CLINIC | Age: 86
End: 2021-01-01

## 2021-01-01 ENCOUNTER — TELEPHONE (OUTPATIENT)
Dept: CARDIOLOGY | Facility: CLINIC | Age: 86
End: 2021-01-01

## 2021-01-01 ENCOUNTER — TELEPHONE (OUTPATIENT)
Dept: FAMILY MEDICINE CLINIC | Facility: CLINIC | Age: 86
End: 2021-01-01

## 2021-01-01 ENCOUNTER — OFFICE VISIT (OUTPATIENT)
Dept: CARDIOLOGY | Facility: CLINIC | Age: 86
End: 2021-01-01

## 2021-01-01 ENCOUNTER — APPOINTMENT (OUTPATIENT)
Dept: CARDIOLOGY | Facility: HOSPITAL | Age: 86
End: 2021-01-01

## 2021-01-01 ENCOUNTER — TELEMEDICINE (OUTPATIENT)
Dept: FAMILY MEDICINE CLINIC | Facility: CLINIC | Age: 86
End: 2021-01-01

## 2021-01-01 ENCOUNTER — HOSPITAL ENCOUNTER (OUTPATIENT)
Dept: FAMILY MEDICINE CLINIC | Facility: CLINIC | Age: 86
Discharge: HOME | End: 2021-05-17
Attending: FAMILY MEDICINE

## 2021-01-01 ENCOUNTER — TRANSCRIBE ORDERS (OUTPATIENT)
Dept: ADMINISTRATIVE | Facility: HOSPITAL | Age: 86
End: 2021-01-01

## 2021-01-01 ENCOUNTER — CLINICAL SUPPORT NO REQUIREMENTS (OUTPATIENT)
Dept: CARDIOLOGY | Facility: CLINIC | Age: 86
End: 2021-01-01

## 2021-01-01 ENCOUNTER — OFFICE VISIT CONVERTED (OUTPATIENT)
Dept: PULMONOLOGY | Facility: CLINIC | Age: 86
End: 2021-01-01
Attending: SPECIALIST

## 2021-01-01 ENCOUNTER — HOSPITAL ENCOUNTER (OUTPATIENT)
Dept: VACCINE CLINIC | Facility: HOSPITAL | Age: 86
Discharge: HOME | End: 2021-03-19
Attending: INTERNAL MEDICINE

## 2021-01-01 ENCOUNTER — HOSPITAL ENCOUNTER (INPATIENT)
Facility: HOSPITAL | Age: 86
LOS: 3 days | Discharge: HOME HEALTH | End: 2022-01-03
Attending: EMERGENCY MEDICINE | Admitting: FAMILY MEDICINE

## 2021-01-01 ENCOUNTER — HOSPITAL ENCOUNTER (OUTPATIENT)
Dept: OTHER | Facility: HOSPITAL | Age: 86
Discharge: HOME | End: 2021-03-31
Attending: INTERNAL MEDICINE

## 2021-01-01 ENCOUNTER — CLINICAL SUPPORT (OUTPATIENT)
Dept: FAMILY MEDICINE CLINIC | Facility: CLINIC | Age: 86
End: 2021-01-01

## 2021-01-01 ENCOUNTER — APPOINTMENT (OUTPATIENT)
Dept: GENERAL RADIOLOGY | Facility: HOSPITAL | Age: 86
End: 2021-01-01

## 2021-01-01 ENCOUNTER — OFFICE VISIT CONVERTED (OUTPATIENT)
Dept: FAMILY MEDICINE CLINIC | Facility: CLINIC | Age: 86
End: 2021-01-01
Attending: FAMILY MEDICINE

## 2021-01-01 ENCOUNTER — HOSPITAL ENCOUNTER (OUTPATIENT)
Dept: OTHER | Facility: HOSPITAL | Age: 86
Discharge: HOME | End: 2021-03-04
Attending: NURSE PRACTITIONER

## 2021-01-01 VITALS
OXYGEN SATURATION: 95 % | HEART RATE: 101 BPM | DIASTOLIC BLOOD PRESSURE: 84 MMHG | WEIGHT: 167.5 LBS | TEMPERATURE: 97.4 F | SYSTOLIC BLOOD PRESSURE: 134 MMHG

## 2021-01-01 VITALS
WEIGHT: 134 LBS | HEIGHT: 59 IN | TEMPERATURE: 96.9 F | HEART RATE: 91 BPM | SYSTOLIC BLOOD PRESSURE: 141 MMHG | DIASTOLIC BLOOD PRESSURE: 59 MMHG | OXYGEN SATURATION: 94 % | BODY MASS INDEX: 27.01 KG/M2

## 2021-01-01 VITALS
WEIGHT: 176.25 LBS | HEART RATE: 94 BPM | TEMPERATURE: 98.2 F | SYSTOLIC BLOOD PRESSURE: 102 MMHG | DIASTOLIC BLOOD PRESSURE: 60 MMHG | OXYGEN SATURATION: 90 %

## 2021-01-01 VITALS
SYSTOLIC BLOOD PRESSURE: 131 MMHG | BODY MASS INDEX: 33.67 KG/M2 | DIASTOLIC BLOOD PRESSURE: 59 MMHG | WEIGHT: 167 LBS | HEIGHT: 59 IN | HEART RATE: 82 BPM | OXYGEN SATURATION: 92 %

## 2021-01-01 VITALS
SYSTOLIC BLOOD PRESSURE: 126 MMHG | RESPIRATION RATE: 14 BRPM | WEIGHT: 173.12 LBS | BODY MASS INDEX: 32.79 KG/M2 | TEMPERATURE: 98.5 F | HEIGHT: 59 IN | HEART RATE: 84 BPM | RESPIRATION RATE: 16 BRPM | BODY MASS INDEX: 33.56 KG/M2 | SYSTOLIC BLOOD PRESSURE: 148 MMHG | OXYGEN SATURATION: 91 % | HEART RATE: 96 BPM | HEIGHT: 59 IN | BODY MASS INDEX: 33.96 KG/M2 | BODY MASS INDEX: 33.77 KG/M2 | BODY MASS INDEX: 34.32 KG/M2 | DIASTOLIC BLOOD PRESSURE: 76 MMHG | RESPIRATION RATE: 18 BRPM | OXYGEN SATURATION: 88 % | DIASTOLIC BLOOD PRESSURE: 88 MMHG | HEART RATE: 93 BPM | OXYGEN SATURATION: 93 % | TEMPERATURE: 97.4 F | RESPIRATION RATE: 16 BRPM | DIASTOLIC BLOOD PRESSURE: 75 MMHG | TEMPERATURE: 97.3 F | OXYGEN SATURATION: 94 % | DIASTOLIC BLOOD PRESSURE: 60 MMHG | TEMPERATURE: 98.2 F | RESPIRATION RATE: 16 BRPM | DIASTOLIC BLOOD PRESSURE: 72 MMHG | SYSTOLIC BLOOD PRESSURE: 158 MMHG | BODY MASS INDEX: 32.66 KG/M2 | RESPIRATION RATE: 14 BRPM | SYSTOLIC BLOOD PRESSURE: 157 MMHG | WEIGHT: 170.25 LBS | RESPIRATION RATE: 13 BRPM | WEIGHT: 173.25 LBS | SYSTOLIC BLOOD PRESSURE: 134 MMHG | DIASTOLIC BLOOD PRESSURE: 70 MMHG | TEMPERATURE: 97.5 F | SYSTOLIC BLOOD PRESSURE: 144 MMHG | WEIGHT: 166.5 LBS | HEIGHT: 60 IN | HEART RATE: 78 BPM | OXYGEN SATURATION: 91 % | SYSTOLIC BLOOD PRESSURE: 140 MMHG | RESPIRATION RATE: 16 BRPM | WEIGHT: 162 LBS | HEART RATE: 81 BPM | WEIGHT: 174.5 LBS | BODY MASS INDEX: 34.79 KG/M2 | HEART RATE: 92 BPM | RESPIRATION RATE: 16 BRPM | OXYGEN SATURATION: 84 % | HEIGHT: 60 IN | OXYGEN SATURATION: 95 % | HEIGHT: 59 IN | TEMPERATURE: 97.9 F | SYSTOLIC BLOOD PRESSURE: 136 MMHG | SYSTOLIC BLOOD PRESSURE: 110 MMHG | HEIGHT: 60 IN | HEIGHT: 59 IN | BODY MASS INDEX: 34.01 KG/M2 | TEMPERATURE: 97.4 F | DIASTOLIC BLOOD PRESSURE: 65 MMHG | WEIGHT: 167 LBS | RESPIRATION RATE: 16 BRPM | DIASTOLIC BLOOD PRESSURE: 67 MMHG | DIASTOLIC BLOOD PRESSURE: 70 MMHG | HEART RATE: 104 BPM | WEIGHT: 167 LBS | BODY MASS INDEX: 34.9 KG/M2 | HEIGHT: 60 IN | SYSTOLIC BLOOD PRESSURE: 156 MMHG | HEART RATE: 91 BPM | BODY MASS INDEX: 32.79 KG/M2 | HEART RATE: 78 BPM | HEIGHT: 60 IN | HEIGHT: 60 IN | BODY MASS INDEX: 34.26 KG/M2 | TEMPERATURE: 97.2 F | OXYGEN SATURATION: 92 % | WEIGHT: 177.19 LBS | RESPIRATION RATE: 16 BRPM | HEART RATE: 98 BPM | DIASTOLIC BLOOD PRESSURE: 60 MMHG | OXYGEN SATURATION: 85 % | WEIGHT: 173 LBS | DIASTOLIC BLOOD PRESSURE: 80 MMHG | OXYGEN SATURATION: 94 % | TEMPERATURE: 98.3 F | HEIGHT: 60 IN | TEMPERATURE: 98.3 F | OXYGEN SATURATION: 91 % | WEIGHT: 172 LBS | TEMPERATURE: 98.6 F | SYSTOLIC BLOOD PRESSURE: 120 MMHG | HEART RATE: 77 BPM

## 2021-01-01 VITALS
BODY MASS INDEX: 29.23 KG/M2 | TEMPERATURE: 97.1 F | HEIGHT: 59 IN | DIASTOLIC BLOOD PRESSURE: 78 MMHG | SYSTOLIC BLOOD PRESSURE: 140 MMHG | WEIGHT: 145 LBS | HEART RATE: 99 BPM

## 2021-01-01 VITALS
DIASTOLIC BLOOD PRESSURE: 70 MMHG | HEART RATE: 89 BPM | OXYGEN SATURATION: 91 % | SYSTOLIC BLOOD PRESSURE: 138 MMHG | WEIGHT: 169.19 LBS | TEMPERATURE: 97.6 F

## 2021-01-01 VITALS
SYSTOLIC BLOOD PRESSURE: 130 MMHG | OXYGEN SATURATION: 97 % | TEMPERATURE: 98 F | DIASTOLIC BLOOD PRESSURE: 80 MMHG | WEIGHT: 174 LBS | HEART RATE: 85 BPM

## 2021-01-01 VITALS
TEMPERATURE: 96.6 F | SYSTOLIC BLOOD PRESSURE: 122 MMHG | WEIGHT: 133 LBS | DIASTOLIC BLOOD PRESSURE: 70 MMHG | HEART RATE: 101 BPM | OXYGEN SATURATION: 92 %

## 2021-01-01 VITALS
SYSTOLIC BLOOD PRESSURE: 133 MMHG | HEART RATE: 82 BPM | HEIGHT: 59 IN | WEIGHT: 167 LBS | OXYGEN SATURATION: 92 % | DIASTOLIC BLOOD PRESSURE: 67 MMHG | BODY MASS INDEX: 33.67 KG/M2

## 2021-01-01 VITALS
SYSTOLIC BLOOD PRESSURE: 110 MMHG | TEMPERATURE: 97.3 F | WEIGHT: 135 LBS | OXYGEN SATURATION: 92 % | HEART RATE: 108 BPM | SYSTOLIC BLOOD PRESSURE: 142 MMHG | OXYGEN SATURATION: 100 % | RESPIRATION RATE: 15 BRPM | DIASTOLIC BLOOD PRESSURE: 76 MMHG | TEMPERATURE: 97.3 F | WEIGHT: 167.25 LBS | HEIGHT: 59 IN | HEART RATE: 88 BPM | BODY MASS INDEX: 27.21 KG/M2 | DIASTOLIC BLOOD PRESSURE: 68 MMHG

## 2021-01-01 VITALS
WEIGHT: 136 LBS | HEART RATE: 91 BPM | HEIGHT: 59 IN | TEMPERATURE: 96.7 F | BODY MASS INDEX: 27.42 KG/M2 | SYSTOLIC BLOOD PRESSURE: 145 MMHG | OXYGEN SATURATION: 94 % | DIASTOLIC BLOOD PRESSURE: 87 MMHG

## 2021-01-01 VITALS
SYSTOLIC BLOOD PRESSURE: 148 MMHG | TEMPERATURE: 97.6 F | HEIGHT: 59 IN | DIASTOLIC BLOOD PRESSURE: 71 MMHG | OXYGEN SATURATION: 90 % | HEART RATE: 86 BPM | WEIGHT: 150 LBS | BODY MASS INDEX: 30.24 KG/M2

## 2021-01-01 VITALS
DIASTOLIC BLOOD PRESSURE: 71 MMHG | HEART RATE: 90 BPM | HEIGHT: 59 IN | BODY MASS INDEX: 28.73 KG/M2 | TEMPERATURE: 97.9 F | SYSTOLIC BLOOD PRESSURE: 140 MMHG | WEIGHT: 142.5 LBS | OXYGEN SATURATION: 85 %

## 2021-01-01 VITALS
BODY MASS INDEX: 33.87 KG/M2 | DIASTOLIC BLOOD PRESSURE: 78 MMHG | WEIGHT: 168 LBS | HEIGHT: 59 IN | OXYGEN SATURATION: 91 % | HEART RATE: 82 BPM | SYSTOLIC BLOOD PRESSURE: 129 MMHG

## 2021-01-01 VITALS
OXYGEN SATURATION: 92 % | TEMPERATURE: 98 F | HEART RATE: 101 BPM | SYSTOLIC BLOOD PRESSURE: 122 MMHG | DIASTOLIC BLOOD PRESSURE: 80 MMHG | WEIGHT: 168 LBS

## 2021-01-01 VITALS
WEIGHT: 178 LBS | OXYGEN SATURATION: 89 % | SYSTOLIC BLOOD PRESSURE: 140 MMHG | DIASTOLIC BLOOD PRESSURE: 66 MMHG | HEART RATE: 96 BPM | TEMPERATURE: 97.6 F

## 2021-01-01 VITALS
DIASTOLIC BLOOD PRESSURE: 77 MMHG | HEART RATE: 108 BPM | OXYGEN SATURATION: 91 % | HEIGHT: 71 IN | WEIGHT: 174 LBS | SYSTOLIC BLOOD PRESSURE: 131 MMHG | BODY MASS INDEX: 24.36 KG/M2

## 2021-01-01 VITALS
DIASTOLIC BLOOD PRESSURE: 84 MMHG | SYSTOLIC BLOOD PRESSURE: 138 MMHG | TEMPERATURE: 97.7 F | HEART RATE: 99 BPM | OXYGEN SATURATION: 90 %

## 2021-01-01 VITALS
DIASTOLIC BLOOD PRESSURE: 60 MMHG | OXYGEN SATURATION: 98 % | HEART RATE: 80 BPM | HEIGHT: 58 IN | SYSTOLIC BLOOD PRESSURE: 140 MMHG | TEMPERATURE: 96.8 F | RESPIRATION RATE: 15 BRPM | BODY MASS INDEX: 27.92 KG/M2 | WEIGHT: 133 LBS

## 2021-01-01 VITALS
DIASTOLIC BLOOD PRESSURE: 69 MMHG | TEMPERATURE: 97.8 F | HEIGHT: 59 IN | OXYGEN SATURATION: 99 % | RESPIRATION RATE: 17 BRPM | SYSTOLIC BLOOD PRESSURE: 134 MMHG | BODY MASS INDEX: 26.81 KG/M2 | WEIGHT: 133 LBS | HEART RATE: 84 BPM

## 2021-01-01 VITALS
SYSTOLIC BLOOD PRESSURE: 110 MMHG | HEART RATE: 80 BPM | TEMPERATURE: 98 F | DIASTOLIC BLOOD PRESSURE: 70 MMHG | OXYGEN SATURATION: 96 %

## 2021-01-01 VITALS
SYSTOLIC BLOOD PRESSURE: 128 MMHG | HEART RATE: 88 BPM | WEIGHT: 150.5 LBS | HEIGHT: 59 IN | BODY MASS INDEX: 30.34 KG/M2 | DIASTOLIC BLOOD PRESSURE: 66 MMHG

## 2021-01-01 VITALS — HEART RATE: 107 BPM | WEIGHT: 173 LBS | OXYGEN SATURATION: 85 % | HEIGHT: 60 IN | BODY MASS INDEX: 33.96 KG/M2

## 2021-01-01 VITALS
HEART RATE: 101 BPM | SYSTOLIC BLOOD PRESSURE: 132 MMHG | TEMPERATURE: 97.1 F | DIASTOLIC BLOOD PRESSURE: 78 MMHG | OXYGEN SATURATION: 99 % | BODY MASS INDEX: 27.06 KG/M2 | WEIGHT: 134 LBS

## 2021-01-01 VITALS
TEMPERATURE: 96.9 F | SYSTOLIC BLOOD PRESSURE: 140 MMHG | HEIGHT: 59 IN | WEIGHT: 142 LBS | OXYGEN SATURATION: 90 % | HEART RATE: 92 BPM | DIASTOLIC BLOOD PRESSURE: 70 MMHG | BODY MASS INDEX: 28.63 KG/M2

## 2021-01-01 VITALS
SYSTOLIC BLOOD PRESSURE: 131 MMHG | DIASTOLIC BLOOD PRESSURE: 64 MMHG | HEIGHT: 59 IN | WEIGHT: 163 LBS | BODY MASS INDEX: 32.86 KG/M2 | HEART RATE: 92 BPM | OXYGEN SATURATION: 94 %

## 2021-01-01 VITALS
OXYGEN SATURATION: 90 % | HEIGHT: 59 IN | SYSTOLIC BLOOD PRESSURE: 127 MMHG | DIASTOLIC BLOOD PRESSURE: 65 MMHG | HEART RATE: 89 BPM | WEIGHT: 163 LBS | BODY MASS INDEX: 32.86 KG/M2

## 2021-01-01 VITALS
BODY MASS INDEX: 35.28 KG/M2 | OXYGEN SATURATION: 100 % | SYSTOLIC BLOOD PRESSURE: 139 MMHG | WEIGHT: 175 LBS | DIASTOLIC BLOOD PRESSURE: 90 MMHG | HEART RATE: 92 BPM | HEIGHT: 59 IN

## 2021-01-01 VITALS
WEIGHT: 173 LBS | HEART RATE: 100 BPM | DIASTOLIC BLOOD PRESSURE: 68 MMHG | TEMPERATURE: 97.5 F | SYSTOLIC BLOOD PRESSURE: 134 MMHG | OXYGEN SATURATION: 93 %

## 2021-01-01 VITALS
WEIGHT: 139.5 LBS | BODY MASS INDEX: 29.28 KG/M2 | DIASTOLIC BLOOD PRESSURE: 82 MMHG | OXYGEN SATURATION: 88 % | HEART RATE: 78 BPM | RESPIRATION RATE: 16 BRPM | TEMPERATURE: 98.7 F | SYSTOLIC BLOOD PRESSURE: 124 MMHG | HEIGHT: 58 IN

## 2021-01-01 VITALS
DIASTOLIC BLOOD PRESSURE: 84 MMHG | TEMPERATURE: 97.6 F | SYSTOLIC BLOOD PRESSURE: 146 MMHG | HEART RATE: 65 BPM | WEIGHT: 169 LBS | OXYGEN SATURATION: 85 %

## 2021-01-01 VITALS
RESPIRATION RATE: 18 BRPM | WEIGHT: 132 LBS | SYSTOLIC BLOOD PRESSURE: 150 MMHG | DIASTOLIC BLOOD PRESSURE: 60 MMHG | TEMPERATURE: 98 F | OXYGEN SATURATION: 97 % | HEIGHT: 59 IN | HEART RATE: 83 BPM | BODY MASS INDEX: 26.61 KG/M2

## 2021-01-01 VITALS
HEART RATE: 101 BPM | WEIGHT: 165 LBS | OXYGEN SATURATION: 88 % | DIASTOLIC BLOOD PRESSURE: 74 MMHG | SYSTOLIC BLOOD PRESSURE: 132 MMHG | TEMPERATURE: 98.2 F

## 2021-01-01 VITALS
BODY MASS INDEX: 32.66 KG/M2 | WEIGHT: 162 LBS | OXYGEN SATURATION: 93 % | DIASTOLIC BLOOD PRESSURE: 73 MMHG | HEIGHT: 59 IN | SYSTOLIC BLOOD PRESSURE: 131 MMHG | HEART RATE: 85 BPM

## 2021-01-01 VITALS
HEIGHT: 59 IN | OXYGEN SATURATION: 94 % | HEART RATE: 112 BPM | DIASTOLIC BLOOD PRESSURE: 57 MMHG | BODY MASS INDEX: 26.61 KG/M2 | WEIGHT: 132 LBS | TEMPERATURE: 97.6 F | SYSTOLIC BLOOD PRESSURE: 91 MMHG

## 2021-01-01 VITALS — HEART RATE: 78 BPM | HEIGHT: 60 IN | WEIGHT: 174 LBS | BODY MASS INDEX: 34.16 KG/M2 | OXYGEN SATURATION: 86 %

## 2021-01-01 VITALS
DIASTOLIC BLOOD PRESSURE: 80 MMHG | HEART RATE: 110 BPM | SYSTOLIC BLOOD PRESSURE: 122 MMHG | OXYGEN SATURATION: 89 % | WEIGHT: 172 LBS | TEMPERATURE: 97.7 F

## 2021-01-01 VITALS
SYSTOLIC BLOOD PRESSURE: 134 MMHG | DIASTOLIC BLOOD PRESSURE: 76 MMHG | BODY MASS INDEX: 26.41 KG/M2 | HEIGHT: 59 IN | HEART RATE: 76 BPM | WEIGHT: 131 LBS

## 2021-01-01 VITALS
DIASTOLIC BLOOD PRESSURE: 56 MMHG | HEIGHT: 59 IN | OXYGEN SATURATION: 97 % | BODY MASS INDEX: 26.91 KG/M2 | SYSTOLIC BLOOD PRESSURE: 115 MMHG | TEMPERATURE: 97.1 F | WEIGHT: 133.47 LBS | HEART RATE: 86 BPM

## 2021-01-01 VITALS
OXYGEN SATURATION: 92 % | BODY MASS INDEX: 33.87 KG/M2 | WEIGHT: 168 LBS | HEIGHT: 59 IN | SYSTOLIC BLOOD PRESSURE: 123 MMHG | HEART RATE: 80 BPM | DIASTOLIC BLOOD PRESSURE: 55 MMHG

## 2021-01-01 VITALS
WEIGHT: 166.12 LBS | DIASTOLIC BLOOD PRESSURE: 82 MMHG | TEMPERATURE: 98.5 F | SYSTOLIC BLOOD PRESSURE: 128 MMHG | OXYGEN SATURATION: 91 % | HEART RATE: 87 BPM

## 2021-01-01 VITALS
OXYGEN SATURATION: 94 % | TEMPERATURE: 100.1 F | DIASTOLIC BLOOD PRESSURE: 68 MMHG | HEART RATE: 86 BPM | SYSTOLIC BLOOD PRESSURE: 120 MMHG

## 2021-01-01 VITALS
HEART RATE: 82 BPM | WEIGHT: 133 LBS | TEMPERATURE: 96.9 F | SYSTOLIC BLOOD PRESSURE: 125 MMHG | BODY MASS INDEX: 27.92 KG/M2 | DIASTOLIC BLOOD PRESSURE: 90 MMHG | HEIGHT: 58 IN | OXYGEN SATURATION: 98 %

## 2021-01-01 VITALS
HEART RATE: 85 BPM | OXYGEN SATURATION: 92 % | SYSTOLIC BLOOD PRESSURE: 133 MMHG | DIASTOLIC BLOOD PRESSURE: 81 MMHG | WEIGHT: 169 LBS | HEIGHT: 59 IN | BODY MASS INDEX: 34.07 KG/M2

## 2021-01-01 VITALS
WEIGHT: 172 LBS | SYSTOLIC BLOOD PRESSURE: 120 MMHG | TEMPERATURE: 97.8 F | OXYGEN SATURATION: 98 % | DIASTOLIC BLOOD PRESSURE: 70 MMHG | HEART RATE: 89 BPM

## 2021-01-01 VITALS
BODY MASS INDEX: 27.87 KG/M2 | TEMPERATURE: 97.3 F | DIASTOLIC BLOOD PRESSURE: 82 MMHG | SYSTOLIC BLOOD PRESSURE: 142 MMHG | WEIGHT: 138 LBS | OXYGEN SATURATION: 97 % | HEART RATE: 97 BPM

## 2021-01-01 VITALS
HEART RATE: 81 BPM | HEIGHT: 59 IN | HEART RATE: 79 BPM | WEIGHT: 171 LBS | DIASTOLIC BLOOD PRESSURE: 62 MMHG | DIASTOLIC BLOOD PRESSURE: 64 MMHG | TEMPERATURE: 96.9 F | WEIGHT: 133 LBS | BODY MASS INDEX: 34.47 KG/M2 | HEIGHT: 59 IN | BODY MASS INDEX: 26.81 KG/M2 | SYSTOLIC BLOOD PRESSURE: 140 MMHG | OXYGEN SATURATION: 97 % | SYSTOLIC BLOOD PRESSURE: 140 MMHG | OXYGEN SATURATION: 97 %

## 2021-01-01 VITALS
DIASTOLIC BLOOD PRESSURE: 68 MMHG | BODY MASS INDEX: 27.01 KG/M2 | OXYGEN SATURATION: 97 % | WEIGHT: 134 LBS | SYSTOLIC BLOOD PRESSURE: 139 MMHG | HEART RATE: 87 BPM | HEIGHT: 59 IN

## 2021-01-01 VITALS
TEMPERATURE: 97.1 F | SYSTOLIC BLOOD PRESSURE: 138 MMHG | HEART RATE: 83 BPM | OXYGEN SATURATION: 95 % | WEIGHT: 133 LBS | DIASTOLIC BLOOD PRESSURE: 84 MMHG | BODY MASS INDEX: 26.86 KG/M2

## 2021-01-01 VITALS
WEIGHT: 168.12 LBS | OXYGEN SATURATION: 89 % | TEMPERATURE: 97.4 F | HEART RATE: 53 BPM | SYSTOLIC BLOOD PRESSURE: 120 MMHG | DIASTOLIC BLOOD PRESSURE: 70 MMHG

## 2021-01-01 VITALS
SYSTOLIC BLOOD PRESSURE: 112 MMHG | DIASTOLIC BLOOD PRESSURE: 62 MMHG | HEIGHT: 58 IN | TEMPERATURE: 97.5 F | OXYGEN SATURATION: 94 % | HEART RATE: 86 BPM | BODY MASS INDEX: 28.18 KG/M2 | WEIGHT: 134.25 LBS

## 2021-01-01 VITALS
DIASTOLIC BLOOD PRESSURE: 68 MMHG | HEART RATE: 89 BPM | SYSTOLIC BLOOD PRESSURE: 134 MMHG | BODY MASS INDEX: 34.36 KG/M2 | WEIGHT: 175 LBS | OXYGEN SATURATION: 91 % | HEIGHT: 60 IN

## 2021-01-01 VITALS
OXYGEN SATURATION: 93 % | HEART RATE: 80 BPM | DIASTOLIC BLOOD PRESSURE: 70 MMHG | SYSTOLIC BLOOD PRESSURE: 120 MMHG | TEMPERATURE: 97.3 F

## 2021-01-01 VITALS
SYSTOLIC BLOOD PRESSURE: 124 MMHG | DIASTOLIC BLOOD PRESSURE: 82 MMHG | OXYGEN SATURATION: 89 % | HEART RATE: 109 BPM | TEMPERATURE: 97.6 F | WEIGHT: 168 LBS

## 2021-01-01 VITALS — WEIGHT: 170 LBS | HEART RATE: 86 BPM | BODY MASS INDEX: 33.38 KG/M2 | HEIGHT: 60 IN | OXYGEN SATURATION: 90 %

## 2021-01-01 VITALS — HEART RATE: 92 BPM | BODY MASS INDEX: 32 KG/M2 | OXYGEN SATURATION: 88 % | HEIGHT: 60 IN | WEIGHT: 163 LBS

## 2021-01-01 VITALS
HEIGHT: 58 IN | SYSTOLIC BLOOD PRESSURE: 142 MMHG | HEART RATE: 91 BPM | TEMPERATURE: 96.9 F | TEMPERATURE: 97.1 F | OXYGEN SATURATION: 92 % | BODY MASS INDEX: 28.13 KG/M2 | WEIGHT: 137 LBS | BODY MASS INDEX: 27.62 KG/M2 | SYSTOLIC BLOOD PRESSURE: 151 MMHG | DIASTOLIC BLOOD PRESSURE: 46 MMHG | OXYGEN SATURATION: 94 % | HEART RATE: 95 BPM | DIASTOLIC BLOOD PRESSURE: 105 MMHG | HEIGHT: 59 IN | WEIGHT: 134 LBS

## 2021-01-01 VITALS
DIASTOLIC BLOOD PRESSURE: 70 MMHG | OXYGEN SATURATION: 96 % | RESPIRATION RATE: 18 BRPM | SYSTOLIC BLOOD PRESSURE: 105 MMHG | TEMPERATURE: 98 F | HEART RATE: 108 BPM | WEIGHT: 158 LBS

## 2021-01-01 VITALS
WEIGHT: 177 LBS | OXYGEN SATURATION: 91 % | HEART RATE: 96 BPM | SYSTOLIC BLOOD PRESSURE: 140 MMHG | DIASTOLIC BLOOD PRESSURE: 80 MMHG | TEMPERATURE: 97.2 F

## 2021-01-01 VITALS
BODY MASS INDEX: 35.3 KG/M2 | DIASTOLIC BLOOD PRESSURE: 71 MMHG | HEIGHT: 59 IN | OXYGEN SATURATION: 94 % | SYSTOLIC BLOOD PRESSURE: 146 MMHG | HEART RATE: 70 BPM | WEIGHT: 175.12 LBS

## 2021-01-01 VITALS
TEMPERATURE: 97.3 F | SYSTOLIC BLOOD PRESSURE: 142 MMHG | DIASTOLIC BLOOD PRESSURE: 80 MMHG | HEART RATE: 102 BPM | OXYGEN SATURATION: 88 % | WEIGHT: 169 LBS

## 2021-01-01 VITALS
TEMPERATURE: 98.2 F | HEART RATE: 99 BPM | SYSTOLIC BLOOD PRESSURE: 138 MMHG | OXYGEN SATURATION: 88 % | WEIGHT: 176 LBS | DIASTOLIC BLOOD PRESSURE: 70 MMHG

## 2021-01-01 VITALS
OXYGEN SATURATION: 97 % | SYSTOLIC BLOOD PRESSURE: 110 MMHG | HEART RATE: 79 BPM | TEMPERATURE: 97.4 F | DIASTOLIC BLOOD PRESSURE: 68 MMHG

## 2021-01-01 VITALS
TEMPERATURE: 97.6 F | HEART RATE: 72 BPM | SYSTOLIC BLOOD PRESSURE: 130 MMHG | OXYGEN SATURATION: 90 % | WEIGHT: 165.37 LBS | DIASTOLIC BLOOD PRESSURE: 80 MMHG

## 2021-01-01 VITALS — WEIGHT: 173 LBS | HEIGHT: 60 IN | HEART RATE: 97 BPM | BODY MASS INDEX: 33.96 KG/M2 | OXYGEN SATURATION: 90 %

## 2021-01-01 VITALS — OXYGEN SATURATION: 86 % | BODY MASS INDEX: 34.36 KG/M2 | WEIGHT: 175 LBS | HEIGHT: 60 IN | HEART RATE: 83 BPM

## 2021-01-01 VITALS
HEART RATE: 82 BPM | TEMPERATURE: 96.9 F | DIASTOLIC BLOOD PRESSURE: 86 MMHG | SYSTOLIC BLOOD PRESSURE: 135 MMHG | WEIGHT: 135 LBS | BODY MASS INDEX: 27.21 KG/M2 | HEIGHT: 59 IN | OXYGEN SATURATION: 96 %

## 2021-01-01 VITALS — TEMPERATURE: 98 F

## 2021-01-01 VITALS
OXYGEN SATURATION: 90 % | DIASTOLIC BLOOD PRESSURE: 74 MMHG | TEMPERATURE: 97.8 F | SYSTOLIC BLOOD PRESSURE: 138 MMHG | WEIGHT: 162.37 LBS | HEART RATE: 89 BPM

## 2021-01-01 DIAGNOSIS — Z86.19 HISTORY OF MYCOBACTERIUM AVIUM COMPLEX INFECTION: ICD-10-CM

## 2021-01-01 DIAGNOSIS — K21.9 GASTRO-ESOPHAGEAL REFLUX DISEASE WITHOUT ESOPHAGITIS: ICD-10-CM

## 2021-01-01 DIAGNOSIS — E55.9 AVITAMINOSIS D: ICD-10-CM

## 2021-01-01 DIAGNOSIS — N18.9 ANEMIA IN CHRONIC KIDNEY DISEASE, UNSPECIFIED CKD STAGE: ICD-10-CM

## 2021-01-01 DIAGNOSIS — D64.9 ANEMIA, UNSPECIFIED TYPE: ICD-10-CM

## 2021-01-01 DIAGNOSIS — N18.6 END STAGE RENAL DISEASE (HCC): ICD-10-CM

## 2021-01-01 DIAGNOSIS — E11.9 NON-INSULIN DEPENDENT TYPE 2 DIABETES MELLITUS (HCC): ICD-10-CM

## 2021-01-01 DIAGNOSIS — L60.0 ONYCHOCRYPTOSIS: ICD-10-CM

## 2021-01-01 DIAGNOSIS — I10 ESSENTIAL HYPERTENSION: ICD-10-CM

## 2021-01-01 DIAGNOSIS — M79.672 FOOT PAIN, BILATERAL: Primary | ICD-10-CM

## 2021-01-01 DIAGNOSIS — J47.9 BRONCHIECTASIS WITHOUT COMPLICATION (HCC): ICD-10-CM

## 2021-01-01 DIAGNOSIS — J96.11 CHRONIC RESPIRATORY FAILURE WITH HYPOXIA (HCC): ICD-10-CM

## 2021-01-01 DIAGNOSIS — N18.4 CHRONIC KIDNEY DISEASE, STAGE IV (SEVERE) (HCC): Primary | ICD-10-CM

## 2021-01-01 DIAGNOSIS — R06.89 AIRWAY CLEARANCE IMPAIRMENT: ICD-10-CM

## 2021-01-01 DIAGNOSIS — I10 ESSENTIAL HYPERTENSION, MALIGNANT: ICD-10-CM

## 2021-01-01 DIAGNOSIS — Z95.0 PRESENCE OF CARDIAC PACEMAKER: Primary | ICD-10-CM

## 2021-01-01 DIAGNOSIS — R26.2 DIFFICULTY WALKING: ICD-10-CM

## 2021-01-01 DIAGNOSIS — I50.9 CONGESTIVE HEART FAILURE, UNSPECIFIED HF CHRONICITY, UNSPECIFIED HEART FAILURE TYPE (HCC): Primary | ICD-10-CM

## 2021-01-01 DIAGNOSIS — E11.8 DIABETIC FOOT (HCC): ICD-10-CM

## 2021-01-01 DIAGNOSIS — D63.1 ANEMIA IN CHRONIC KIDNEY DISEASE, UNSPECIFIED CKD STAGE: ICD-10-CM

## 2021-01-01 DIAGNOSIS — J31.0 RHINITIS, UNSPECIFIED TYPE: ICD-10-CM

## 2021-01-01 DIAGNOSIS — Z99.2 DIALYSIS PATIENT (HCC): ICD-10-CM

## 2021-01-01 DIAGNOSIS — Z23 NEED FOR INFLUENZA VACCINATION: Primary | ICD-10-CM

## 2021-01-01 DIAGNOSIS — D64.9 ANEMIA, UNSPECIFIED TYPE: Primary | ICD-10-CM

## 2021-01-01 DIAGNOSIS — N18.30 STAGE 3 CHRONIC KIDNEY DISEASE, UNSPECIFIED WHETHER STAGE 3A OR 3B CKD (HCC): ICD-10-CM

## 2021-01-01 DIAGNOSIS — M79.671 FOOT PAIN, BILATERAL: Primary | ICD-10-CM

## 2021-01-01 DIAGNOSIS — I50.23 ACUTE ON CHRONIC SYSTOLIC CONGESTIVE HEART FAILURE: Primary | ICD-10-CM

## 2021-01-01 DIAGNOSIS — F17.201 TOBACCO ABUSE, IN REMISSION: ICD-10-CM

## 2021-01-01 DIAGNOSIS — B35.1 ONYCHOMYCOSIS: ICD-10-CM

## 2021-01-01 DIAGNOSIS — N18.4 CHRONIC KIDNEY DISEASE, STAGE IV (SEVERE) (HCC): ICD-10-CM

## 2021-01-01 DIAGNOSIS — R21 RASH OF FACE: Primary | ICD-10-CM

## 2021-01-01 DIAGNOSIS — I50.9 CONGESTIVE HEART FAILURE, UNSPECIFIED HF CHRONICITY, UNSPECIFIED HEART FAILURE TYPE (HCC): ICD-10-CM

## 2021-01-01 DIAGNOSIS — J44.9 CHRONIC OBSTRUCTIVE PULMONARY DISEASE, UNSPECIFIED COPD TYPE (HCC): ICD-10-CM

## 2021-01-01 DIAGNOSIS — R21 FACIAL RASH: Primary | ICD-10-CM

## 2021-01-01 DIAGNOSIS — E55.9 VITAMIN D DEFICIENCY: ICD-10-CM

## 2021-01-01 DIAGNOSIS — Z95.0 PRESENCE OF CARDIAC PACEMAKER: ICD-10-CM

## 2021-01-01 DIAGNOSIS — M19.90 ARTHRITIS: ICD-10-CM

## 2021-01-01 DIAGNOSIS — L01.00 IMPETIGO: Primary | ICD-10-CM

## 2021-01-01 DIAGNOSIS — I49.5 SSS (SICK SINUS SYNDROME) (HCC): ICD-10-CM

## 2021-01-01 DIAGNOSIS — E11.9 DIABETES MELLITUS WITHOUT COMPLICATION (HCC): ICD-10-CM

## 2021-01-01 DIAGNOSIS — R13.14 PHARYNGOESOPHAGEAL DYSPHAGIA: ICD-10-CM

## 2021-01-01 DIAGNOSIS — J47.9 BRONCHIECTASIS WITHOUT COMPLICATION (HCC): Primary | ICD-10-CM

## 2021-01-01 DIAGNOSIS — N76.0 ACUTE VAGINITIS: ICD-10-CM

## 2021-01-01 DIAGNOSIS — Z99.81 OXYGEN DEPENDENT: ICD-10-CM

## 2021-01-01 DIAGNOSIS — E78.2 MIXED HYPERLIPIDEMIA: ICD-10-CM

## 2021-01-01 DIAGNOSIS — I50.22 CHRONIC HFREF (HEART FAILURE WITH REDUCED EJECTION FRACTION) (HCC): Primary | ICD-10-CM

## 2021-01-01 DIAGNOSIS — R21 RASH: ICD-10-CM

## 2021-01-01 DIAGNOSIS — J34.89 NASAL DRYNESS: ICD-10-CM

## 2021-01-01 LAB
25(OH)D3 SERPL-MCNC: 11.4 NG/ML
25(OH)D3 SERPL-MCNC: 12.4 NG/ML
ALBUMIN SERPL-MCNC: 3.8 G/DL (ref 3.5–5)
ALBUMIN SERPL-MCNC: 3.8 G/DL (ref 3.5–5)
ALBUMIN SERPL-MCNC: 4 G/DL (ref 3.5–5.2)
ALBUMIN SERPL-MCNC: 4 G/DL (ref 3.5–5.2)
ALBUMIN SERPL-MCNC: 4.3 G/DL (ref 3.5–5)
ALBUMIN SERPL-MCNC: 4.3 G/DL (ref 3.5–5.2)
ALBUMIN SERPL-MCNC: 4.3 G/DL (ref 3.5–5.2)
ALBUMIN SERPL-MCNC: 4.4 G/DL (ref 3.5–5.2)
ALBUMIN/GLOB SERPL: 1.2 {RATIO} (ref 1.4–2.6)
ALBUMIN/GLOB SERPL: 1.5 G/DL
ALBUMIN/GLOB SERPL: 1.5 G/DL
ALBUMIN/GLOB SERPL: 1.6 G/DL
ALBUMIN/GLOB SERPL: 1.6 {RATIO} (ref 1.4–2.6)
ALBUMIN/GLOB SERPL: 1.7 {RATIO} (ref 1.4–2.6)
ALBUMIN/GLOB SERPL: 1.8 G/DL
ALP SERPL-CCNC: 101 U/L (ref 39–117)
ALP SERPL-CCNC: 103 U/L (ref 38–185)
ALP SERPL-CCNC: 106 U/L (ref 39–117)
ALP SERPL-CCNC: 107 U/L (ref 39–117)
ALP SERPL-CCNC: 107 U/L (ref 39–117)
ALP SERPL-CCNC: 116 U/L (ref 38–185)
ALP SERPL-CCNC: 91 U/L (ref 39–117)
ALT SERPL W P-5'-P-CCNC: 10 U/L (ref 1–33)
ALT SERPL W P-5'-P-CCNC: 10 U/L (ref 1–33)
ALT SERPL W P-5'-P-CCNC: 11 U/L (ref 1–33)
ALT SERPL W P-5'-P-CCNC: 13 U/L (ref 1–33)
ALT SERPL W P-5'-P-CCNC: 9 U/L (ref 1–33)
ALT SERPL-CCNC: 10 U/L (ref 10–40)
ALT SERPL-CCNC: 10 U/L (ref 10–40)
AMYLASE FLD-CCNC: 29 U/L (ref 30–150)
ANION GAP SERPL CALC-SCNC: 17 MMOL/L (ref 8–19)
ANION GAP SERPL CALC-SCNC: 25 MMOL/L (ref 8–19)
ANION GAP SERPL CALCULATED.3IONS-SCNC: 12.4 MMOL/L (ref 5–15)
ANION GAP SERPL CALCULATED.3IONS-SCNC: 13.7 MMOL/L (ref 5–15)
ANION GAP SERPL CALCULATED.3IONS-SCNC: 14 MMOL/L (ref 5–15)
ANION GAP SERPL CALCULATED.3IONS-SCNC: 14.4 MMOL/L (ref 5–15)
ANION GAP SERPL CALCULATED.3IONS-SCNC: 7.6 MMOL/L (ref 5–15)
APPEARANCE FLD: ABNORMAL
APPEARANCE UR: CLEAR
AST SERPL-CCNC: 16 U/L (ref 1–32)
AST SERPL-CCNC: 16 U/L (ref 1–32)
AST SERPL-CCNC: 17 U/L (ref 1–32)
AST SERPL-CCNC: 18 U/L (ref 15–50)
AST SERPL-CCNC: 18 U/L (ref 15–50)
AST SERPL-CCNC: 18 U/L (ref 1–32)
AST SERPL-CCNC: 19 U/L (ref 1–32)
BACTERIA FLD CULT: NORMAL
BACTERIA UR QL AUTO: ABNORMAL /HPF
BASOPHILS # BLD AUTO: 0.01 10*3/MM3 (ref 0–0.2)
BASOPHILS # BLD AUTO: 0.02 10*3/MM3 (ref 0–0.2)
BASOPHILS # BLD AUTO: 0.02 10*3/UL (ref 0–0.2)
BASOPHILS # BLD AUTO: 0.03 10*3/MM3 (ref 0–0.2)
BASOPHILS # BLD AUTO: 0.03 10*3/UL (ref 0–0.2)
BASOPHILS NFR BLD AUTO: 0.2 % (ref 0–1.5)
BASOPHILS NFR BLD AUTO: 0.3 % (ref 0–1.5)
BASOPHILS NFR BLD AUTO: 0.3 % (ref 0–3)
BASOPHILS NFR BLD AUTO: 0.4 % (ref 0–1.5)
BASOPHILS NFR BLD AUTO: 0.7 % (ref 0–3)
BILIRUB CONJ SERPL-MCNC: <0.2 MG/DL (ref 0–0.3)
BILIRUB INDIRECT SERPL-MCNC: NORMAL MG/DL
BILIRUB SERPL-MCNC: 0.19 MG/DL (ref 0.2–1.3)
BILIRUB SERPL-MCNC: 0.2 MG/DL (ref 0–1.2)
BILIRUB SERPL-MCNC: 0.21 MG/DL (ref 0.2–1.3)
BILIRUB SERPL-MCNC: 0.3 MG/DL (ref 0–1.2)
BILIRUB SERPL-MCNC: 0.6 MG/DL (ref 0–1.2)
BILIRUB UR QL STRIP: NEGATIVE
BILIRUB UR QL: NEGATIVE
BUN SERPL-MCNC: 42 MG/DL (ref 8–23)
BUN SERPL-MCNC: 67 MG/DL (ref 8–23)
BUN SERPL-MCNC: 68 MG/DL (ref 8–23)
BUN SERPL-MCNC: 77 MG/DL (ref 8–23)
BUN SERPL-MCNC: 80 MG/DL (ref 5–25)
BUN SERPL-MCNC: 87 MG/DL (ref 5–25)
BUN SERPL-MCNC: 88 MG/DL (ref 8–23)
BUN/CREAT SERPL: 31.8 (ref 7–25)
BUN/CREAT SERPL: 35.5 (ref 7–25)
BUN/CREAT SERPL: 35.8 (ref 7–25)
BUN/CREAT SERPL: 38 {RATIO} (ref 6–20)
BUN/CREAT SERPL: 38.9 (ref 7–25)
BUN/CREAT SERPL: 46 {RATIO} (ref 6–20)
BUN/CREAT SERPL: 48.1 (ref 7–25)
BURR CELLS BLD QL SMEAR: NORMAL
CALCIUM SERPL-MCNC: 8.8 MG/DL (ref 8.7–10.4)
CALCIUM SERPL-MCNC: 9 MG/DL (ref 8.7–10.4)
CALCIUM SPEC-SCNC: 8.9 MG/DL (ref 8.2–9.6)
CALCIUM SPEC-SCNC: 9.1 MG/DL (ref 8.2–9.6)
CALCIUM SPEC-SCNC: 9.2 MG/DL (ref 8.2–9.6)
CALCIUM SPEC-SCNC: 9.3 MG/DL (ref 8.2–9.6)
CALCIUM SPEC-SCNC: 9.5 MG/DL (ref 8.2–9.6)
CHLORIDE SERPL-SCNC: 101 MMOL/L (ref 99–111)
CHLORIDE SERPL-SCNC: 104 MMOL/L (ref 98–107)
CHLORIDE SERPL-SCNC: 104 MMOL/L (ref 98–107)
CHLORIDE SERPL-SCNC: 104 MMOL/L (ref 99–111)
CHLORIDE SERPL-SCNC: 105 MMOL/L (ref 98–107)
CHLORIDE SERPL-SCNC: 106 MMOL/L (ref 98–107)
CHLORIDE SERPL-SCNC: 111 MMOL/L (ref 98–107)
CHOLEST SERPL-MCNC: 196 MG/DL (ref 107–200)
CHOLEST/HDLC SERPL: 2.4 {RATIO} (ref 3–6)
CLARITY UR: CLEAR
CO2 SERPL-SCNC: 23.6 MMOL/L (ref 22–29)
CO2 SERPL-SCNC: 25 MMOL/L (ref 22–29)
CO2 SERPL-SCNC: 25.3 MMOL/L (ref 22–29)
CO2 SERPL-SCNC: 25.6 MMOL/L (ref 22–29)
CO2 SERPL-SCNC: 27.4 MMOL/L (ref 22–29)
COLOR AMN: ABNORMAL
COLOR UR: YELLOW
COLOR UR: YELLOW
CONV ABS IMM GRAN: 0.01 10*3/UL (ref 0–0.2)
CONV ABS IMM GRAN: 0.01 10*3/UL (ref 0–0.2)
CONV BACTERIA: NEGATIVE
CONV CO2: 24 MMOL/L (ref 22–32)
CONV CO2: 31 MMOL/L (ref 22–32)
CONV COLLECTION SOURCE (UA): ABNORMAL
CONV CREATININE URINE, RANDOM: 21.1 MG/DL (ref 10–300)
CONV IMMATURE GRAN: 0.2 % (ref 0–1.8)
CONV IMMATURE GRAN: 0.2 % (ref 0–1.8)
CONV MICROALBUM.,U,RANDOM: <12 MG/L (ref 0–20)
CONV TOTAL PROTEIN: 6.1 G/DL (ref 6.3–8.2)
CONV TOTAL PROTEIN: 6.9 G/DL (ref 6.3–8.2)
CONV TOTAL PROTEIN: 7 G/DL (ref 6.3–8.2)
CONV UROBILINOGEN IN URINE BY AUTOMATED TEST STRIP: 0.2 {EHRLICHU}/DL (ref 0.1–1)
CREAT SERPL-MCNC: 1.32 MG/DL (ref 0.57–1)
CREAT SERPL-MCNC: 1.75 MG/DL (ref 0.57–1)
CREAT SERPL-MCNC: 1.83 MG/DL (ref 0.57–1)
CREAT SERPL-MCNC: 1.87 MG/DL (ref 0.57–1)
CREAT SERPL-MCNC: 2.17 MG/DL (ref 0.57–1)
CREAT UR-MCNC: 1.91 MG/DL (ref 0.5–0.9)
CREAT UR-MCNC: 2.08 MG/DL (ref 0.5–0.9)
CRYSTALS FLD MICRO: ABNORMAL
D-LACTATE SERPL-SCNC: 1 MMOL/L (ref 0.5–2)
DEPRECATED RDW RBC AUTO: 46.9 FL (ref 37–54)
DEPRECATED RDW RBC AUTO: 47.1 FL (ref 37–54)
DEPRECATED RDW RBC AUTO: 47.6 FL (ref 37–54)
DEPRECATED RDW RBC AUTO: 47.8 FL (ref 36.4–46.3)
DEPRECATED RDW RBC AUTO: 47.9 FL (ref 36.4–46.3)
DEPRECATED RDW RBC AUTO: 51.5 FL (ref 37–54)
DEPRECATED RDW RBC AUTO: 53.2 FL (ref 37–54)
EOSINOPHIL # BLD AUTO: 0 10*3/MM3 (ref 0–0.4)
EOSINOPHIL # BLD AUTO: 0.13 10*3/MM3 (ref 0–0.4)
EOSINOPHIL # BLD AUTO: 0.13 10*3/UL (ref 0–0.7)
EOSINOPHIL # BLD AUTO: 0.13 10*3/UL (ref 0–0.7)
EOSINOPHIL # BLD AUTO: 0.14 10*3/MM3 (ref 0–0.4)
EOSINOPHIL # BLD AUTO: 0.16 10*3/MM3 (ref 0–0.4)
EOSINOPHIL # BLD AUTO: 0.17 10*3/MM3 (ref 0–0.4)
EOSINOPHIL # BLD AUTO: 2.3 % (ref 0–7)
EOSINOPHIL # BLD AUTO: 2.9 % (ref 0–7)
EOSINOPHIL NFR BLD AUTO: 0 % (ref 0.3–6.2)
EOSINOPHIL NFR BLD AUTO: 1.5 % (ref 0.3–6.2)
EOSINOPHIL NFR BLD AUTO: 2.7 % (ref 0.3–6.2)
EOSINOPHIL NFR BLD AUTO: 3 % (ref 0.3–6.2)
EOSINOPHIL NFR BLD AUTO: 3 % (ref 0.3–6.2)
ERYTHROCYTE [DISTWIDTH] IN BLOOD BY AUTOMATED COUNT: 15.1 % (ref 12.3–15.4)
ERYTHROCYTE [DISTWIDTH] IN BLOOD BY AUTOMATED COUNT: 15.1 % (ref 12.3–15.4)
ERYTHROCYTE [DISTWIDTH] IN BLOOD BY AUTOMATED COUNT: 15.4 % (ref 11.7–14.4)
ERYTHROCYTE [DISTWIDTH] IN BLOOD BY AUTOMATED COUNT: 15.4 % (ref 11.7–14.4)
ERYTHROCYTE [DISTWIDTH] IN BLOOD BY AUTOMATED COUNT: 15.4 % (ref 12.3–15.4)
ERYTHROCYTE [DISTWIDTH] IN BLOOD BY AUTOMATED COUNT: 16.1 % (ref 12.3–15.4)
ERYTHROCYTE [DISTWIDTH] IN BLOOD BY AUTOMATED COUNT: 16.4 % (ref 12.3–15.4)
EST. AVERAGE GLUCOSE BLD GHB EST-MCNC: 128 MG/DL
GFR SERPL CREATININE-BSD FRML MDRD: 25 ML/MIN/1.73
GFR SERPL CREATININE-BSD FRML MDRD: 30 ML/MIN/1.73
GFR SERPL CREATININE-BSD FRML MDRD: 31 ML/MIN/1.73
GFR SERPL CREATININE-BSD FRML MDRD: 33 ML/MIN/1.73
GFR SERPL CREATININE-BSD FRML MDRD: 45 ML/MIN/1.73
GFR SERPLBLD BASED ON 1.73 SQ M-ARVRAT: 23 ML/MIN/{1.73_M2}
GFR SERPLBLD BASED ON 1.73 SQ M-ARVRAT: 25 ML/MIN/{1.73_M2}
GLOBULIN UR ELPH-MCNC: 2.3 G/DL (ref 2–3.5)
GLOBULIN UR ELPH-MCNC: 2.4 GM/DL
GLOBULIN UR ELPH-MCNC: 2.6 GM/DL
GLOBULIN UR ELPH-MCNC: 2.7 G/DL (ref 2–3.5)
GLOBULIN UR ELPH-MCNC: 2.7 GM/DL
GLOBULIN UR ELPH-MCNC: 2.9 GM/DL
GLOBULIN UR ELPH-MCNC: 3.1 G/DL (ref 2–3.5)
GLUCOSE BLDC GLUCOMTR-MCNC: 222 MG/DL (ref 70–99)
GLUCOSE BLDC GLUCOMTR-MCNC: 261 MG/DL (ref 70–99)
GLUCOSE BLDC GLUCOMTR-MCNC: 297 MG/DL (ref 70–99)
GLUCOSE FLD-MCNC: 134 MG/DL
GLUCOSE SERPL-MCNC: 109 MG/DL (ref 65–99)
GLUCOSE SERPL-MCNC: 113 MG/DL (ref 65–99)
GLUCOSE SERPL-MCNC: 115 MG/DL (ref 65–99)
GLUCOSE SERPL-MCNC: 121 MG/DL (ref 65–99)
GLUCOSE SERPL-MCNC: 135 MG/DL (ref 65–99)
GLUCOSE SERPL-MCNC: 164 MG/DL (ref 65–99)
GLUCOSE SERPL-MCNC: 96 MG/DL (ref 65–99)
GLUCOSE UR QL: NEGATIVE MG/DL
GLUCOSE UR STRIP-MCNC: NEGATIVE MG/DL
HBA1C MFR BLD: 6.1 % (ref 3.5–5.7)
HCT VFR BLD AUTO: 32.2 % (ref 34–46.6)
HCT VFR BLD AUTO: 32.7 % (ref 34–46.6)
HCT VFR BLD AUTO: 34.5 % (ref 34–46.6)
HCT VFR BLD AUTO: 34.5 % (ref 34–46.6)
HCT VFR BLD AUTO: 35 % (ref 37–47)
HCT VFR BLD AUTO: 35.3 % (ref 34–46.6)
HCT VFR BLD AUTO: 36 % (ref 37–47)
HDLC SERPL-MCNC: 82 MG/DL (ref 40–60)
HGB BLD-MCNC: 10.1 G/DL (ref 12–15.9)
HGB BLD-MCNC: 10.6 G/DL (ref 12–15.9)
HGB BLD-MCNC: 10.9 G/DL (ref 12–15.9)
HGB BLD-MCNC: 10.9 G/DL (ref 12–16)
HGB BLD-MCNC: 11.1 G/DL (ref 12–15.9)
HGB BLD-MCNC: 11.1 G/DL (ref 12–16)
HGB BLD-MCNC: 11.3 G/DL (ref 12–15.9)
HGB UR QL STRIP.AUTO: ABNORMAL
HGB UR QL STRIP: NEGATIVE
HOLD SPECIMEN: NORMAL
HOLD SPECIMEN: NORMAL
HYALINE CASTS UR QL AUTO: ABNORMAL /LPF
IMM GRANULOCYTES # BLD AUTO: 0.01 10*3/MM3 (ref 0–0.05)
IMM GRANULOCYTES # BLD AUTO: 0.03 10*3/MM3 (ref 0–0.05)
IMM GRANULOCYTES # BLD AUTO: 0.03 10*3/MM3 (ref 0–0.05)
IMM GRANULOCYTES NFR BLD AUTO: 0.2 % (ref 0–0.5)
IMM GRANULOCYTES NFR BLD AUTO: 0.3 % (ref 0–0.5)
IMM GRANULOCYTES NFR BLD AUTO: 0.5 % (ref 0–0.5)
IRON 24H UR-MRATE: 59 MCG/DL (ref 37–145)
IRON 24H UR-MRATE: 63 MCG/DL (ref 37–145)
IRON SATN MFR SERPL: 15 % (ref 20–50)
IRON SATN MFR SERPL: 17 % (ref 20–50)
KETONES UR QL STRIP: NEGATIVE
KETONES UR QL STRIP: NEGATIVE MG/DL
L PNEUMO1 AG UR QL IA: NEGATIVE
LDH FLD-CCNC: 61 U/L
LDH SERPL-CCNC: 196 U/L (ref 120–240)
LDLC SERPL CALC-MCNC: 99 MG/DL (ref 70–100)
LEUKOCYTE ESTERASE UR QL STRIP.AUTO: ABNORMAL
LEUKOCYTE ESTERASE UR QL STRIP: ABNORMAL
LYMPHOCYTES # BLD AUTO: 0.82 10*3/MM3 (ref 0.7–3.1)
LYMPHOCYTES # BLD AUTO: 1.56 10*3/UL (ref 1–5)
LYMPHOCYTES # BLD AUTO: 1.74 10*3/MM3 (ref 0.7–3.1)
LYMPHOCYTES # BLD AUTO: 1.85 10*3/MM3 (ref 0.7–3.1)
LYMPHOCYTES # BLD AUTO: 2.02 10*3/MM3 (ref 0.7–3.1)
LYMPHOCYTES # BLD AUTO: 2.27 10*3/UL (ref 1–5)
LYMPHOCYTES # BLD AUTO: 2.44 10*3/MM3 (ref 0.7–3.1)
LYMPHOCYTES NFR BLD AUTO: 12.5 % (ref 19.6–45.3)
LYMPHOCYTES NFR BLD AUTO: 28.1 % (ref 19.6–45.3)
LYMPHOCYTES NFR BLD AUTO: 33 % (ref 19.6–45.3)
LYMPHOCYTES NFR BLD AUTO: 34.4 % (ref 20–45)
LYMPHOCYTES NFR BLD AUTO: 35 % (ref 19.6–45.3)
LYMPHOCYTES NFR BLD AUTO: 35.6 % (ref 19.6–45.3)
LYMPHOCYTES NFR BLD AUTO: 39.5 % (ref 20–45)
LYMPHOCYTES NFR FLD MANUAL: 91 %
M PNEUMO IGM SER QL: NEGATIVE
MACROPHAGE FLUID: 2 /100{WBCS}
MAGNESIUM SERPL-MCNC: 2.3 MG/DL (ref 1.7–2.3)
MCH RBC QN AUTO: 26.4 PG (ref 27–31)
MCH RBC QN AUTO: 26.7 PG (ref 27–31)
MCH RBC QN AUTO: 27.3 PG (ref 26.6–33)
MCH RBC QN AUTO: 27.4 PG (ref 26.6–33)
MCH RBC QN AUTO: 27.5 PG (ref 26.6–33)
MCH RBC QN AUTO: 27.7 PG (ref 26.6–33)
MCH RBC QN AUTO: 27.8 PG (ref 26.6–33)
MCHC RBC AUTO-ENTMCNC: 30.8 G/DL (ref 33–37)
MCHC RBC AUTO-ENTMCNC: 31.1 G/DL (ref 33–37)
MCHC RBC AUTO-ENTMCNC: 31.4 G/DL (ref 31.5–35.7)
MCHC RBC AUTO-ENTMCNC: 31.6 G/DL (ref 31.5–35.7)
MCHC RBC AUTO-ENTMCNC: 32 G/DL (ref 31.5–35.7)
MCHC RBC AUTO-ENTMCNC: 32.2 G/DL (ref 31.5–35.7)
MCHC RBC AUTO-ENTMCNC: 32.4 G/DL (ref 31.5–35.7)
MCV RBC AUTO: 85.3 FL (ref 79–97)
MCV RBC AUTO: 85.6 FL (ref 79–97)
MCV RBC AUTO: 85.6 FL (ref 79–97)
MCV RBC AUTO: 85.7 FL (ref 81–99)
MCV RBC AUTO: 85.8 FL (ref 81–99)
MCV RBC AUTO: 87.3 FL (ref 79–97)
MCV RBC AUTO: 88 FL (ref 79–97)
MICROALBUMIN/CREAT UR: 56.9 MG/G{CRE} (ref 0–35)
MONOCYTES # BLD AUTO: 0.35 10*3/MM3 (ref 0.1–0.9)
MONOCYTES # BLD AUTO: 0.37 10*3/MM3 (ref 0.1–0.9)
MONOCYTES # BLD AUTO: 0.46 10*3/UL (ref 0.2–1.2)
MONOCYTES # BLD AUTO: 0.48 10*3/MM3 (ref 0.1–0.9)
MONOCYTES # BLD AUTO: 0.52 10*3/MM3 (ref 0.1–0.9)
MONOCYTES # BLD AUTO: 0.56 10*3/UL (ref 0.2–1.2)
MONOCYTES # BLD AUTO: 0.83 10*3/MM3 (ref 0.1–0.9)
MONOCYTES NFR BLD AUTO: 10.2 % (ref 3–10)
MONOCYTES NFR BLD AUTO: 5.3 % (ref 5–12)
MONOCYTES NFR BLD AUTO: 7 % (ref 5–12)
MONOCYTES NFR BLD AUTO: 8.5 % (ref 5–12)
MONOCYTES NFR BLD AUTO: 9.6 % (ref 5–12)
MONOCYTES NFR BLD AUTO: 9.8 % (ref 3–10)
MONOCYTES NFR BLD AUTO: 9.8 % (ref 5–12)
MONOCYTES NFR FLD: 7 %
NEUTROPHILS # BLD AUTO: 2.34 10*3/UL (ref 2–8)
NEUTROPHILS # BLD AUTO: 2.75 10*3/UL (ref 2–8)
NEUTROPHILS NFR BLD AUTO: 2.83 10*3/MM3 (ref 1.7–7)
NEUTROPHILS NFR BLD AUTO: 2.89 10*3/MM3 (ref 1.7–7)
NEUTROPHILS NFR BLD AUTO: 2.97 10*3/MM3 (ref 1.7–7)
NEUTROPHILS NFR BLD AUTO: 47.9 % (ref 30–85)
NEUTROPHILS NFR BLD AUTO: 5.22 10*3/MM3 (ref 1.7–7)
NEUTROPHILS NFR BLD AUTO: 5.35 10*3/MM3 (ref 1.7–7)
NEUTROPHILS NFR BLD AUTO: 51.6 % (ref 30–85)
NEUTROPHILS NFR BLD AUTO: 52.3 % (ref 42.7–76)
NEUTROPHILS NFR BLD AUTO: 53.6 % (ref 42.7–76)
NEUTROPHILS NFR BLD AUTO: 54.7 % (ref 42.7–76)
NEUTROPHILS NFR BLD AUTO: 60.2 % (ref 42.7–76)
NEUTROPHILS NFR BLD AUTO: 81.5 % (ref 42.7–76)
NEUTROPHILS NFR FLD MANUAL: 2 %
NITRITE UR QL STRIP: NEGATIVE
NITRITE UR QL STRIP: NEGATIVE
NRBC BLD AUTO-RTO: 0 /100 WBC (ref 0–0.2)
NRBC CBCN: 0 % (ref 0–0.7)
NRBC CBCN: 0 % (ref 0–0.7)
NT-PROBNP SERPL-MCNC: ABNORMAL PG/ML (ref 0–1800)
OSMOLALITY SERPL CALC.SUM OF ELEC: 326 MOSM/KG (ref 273–304)
OSMOLALITY SERPL CALC.SUM OF ELEC: 331 MOSM/KG (ref 273–304)
OVALOCYTES BLD QL SMEAR: NORMAL
PH FLD: 8 [PH]
PH UR STRIP.AUTO: 6.5 [PH] (ref 5–8)
PH UR STRIP.AUTO: <=5 [PH] (ref 5–8)
PHOSPHATE SERPL-MCNC: 5 MG/DL (ref 2.5–4.5)
PLATELET # BLD AUTO: 178 10*3/MM3 (ref 140–450)
PLATELET # BLD AUTO: 178 10*3/UL (ref 130–400)
PLATELET # BLD AUTO: 188 10*3/MM3 (ref 140–450)
PLATELET # BLD AUTO: 192 10*3/MM3 (ref 140–450)
PLATELET # BLD AUTO: 192 10*3/UL (ref 130–400)
PLATELET # BLD AUTO: 203 10*3/MM3 (ref 140–450)
PLATELET # BLD AUTO: 236 10*3/MM3 (ref 140–450)
PMV BLD AUTO: 10.2 FL (ref 6–12)
PMV BLD AUTO: 10.5 FL (ref 9.4–12.3)
PMV BLD AUTO: 10.8 FL (ref 6–12)
PMV BLD AUTO: 10.9 FL (ref 6–12)
PMV BLD AUTO: 11 FL (ref 6–12)
PMV BLD AUTO: 11.3 FL (ref 6–12)
PMV BLD AUTO: 11.3 FL (ref 9.4–12.3)
POTASSIUM SERPL-SCNC: 4 MMOL/L (ref 3.5–5.2)
POTASSIUM SERPL-SCNC: 4.1 MMOL/L (ref 3.5–5.2)
POTASSIUM SERPL-SCNC: 4.7 MMOL/L (ref 3.5–5.2)
POTASSIUM SERPL-SCNC: 4.8 MMOL/L (ref 3.5–5.3)
POTASSIUM SERPL-SCNC: 5.1 MMOL/L (ref 3.5–5.2)
POTASSIUM SERPL-SCNC: 5.4 MMOL/L (ref 3.5–5.2)
POTASSIUM SERPL-SCNC: 5.4 MMOL/L (ref 3.5–5.3)
PROT FLD-MCNC: 1.7 G/DL (ref 6.3–8.2)
PROT SERPL-MCNC: 6.6 G/DL (ref 6–8.5)
PROT SERPL-MCNC: 6.7 G/DL (ref 6–8.5)
PROT SERPL-MCNC: 7.1 G/DL (ref 6–8.5)
PROT SERPL-MCNC: 7.1 G/DL (ref 6–8.5)
PROT SERPL-MCNC: 7.2 G/DL (ref 6–8.5)
PROT UR QL STRIP: NEGATIVE
PROT UR QL: NEGATIVE MG/DL
RBC # BLD AUTO: 3.69 10*6/MM3 (ref 3.77–5.28)
RBC # BLD AUTO: 3.82 10*6/MM3 (ref 3.77–5.28)
RBC # BLD AUTO: 3.92 10*6/MM3 (ref 3.77–5.28)
RBC # BLD AUTO: 4.03 10*6/MM3 (ref 3.77–5.28)
RBC # BLD AUTO: 4.08 10*6/UL (ref 4.2–5.4)
RBC # BLD AUTO: 4.14 10*6/MM3 (ref 3.77–5.28)
RBC # BLD AUTO: 4.2 10*6/UL (ref 4.2–5.4)
RBC # FLD AUTO: 500 /UL
RBC # UR STRIP: ABNORMAL /HPF
RBC #/AREA URNS HPF: ABNORMAL /[HPF]
REF LAB TEST METHOD: ABNORMAL
S PNEUM AG SPEC QL LA: POSITIVE
SARS-COV-2 RNA PNL SPEC NAA+PROBE: NOT DETECTED
SMALL PLATELETS BLD QL SMEAR: ADEQUATE
SODIUM SERPL-SCNC: 142 MMOL/L (ref 136–145)
SODIUM SERPL-SCNC: 143 MMOL/L (ref 136–145)
SODIUM SERPL-SCNC: 144 MMOL/L (ref 135–147)
SODIUM SERPL-SCNC: 144 MMOL/L (ref 136–145)
SODIUM SERPL-SCNC: 144 MMOL/L (ref 136–145)
SODIUM SERPL-SCNC: 146 MMOL/L (ref 136–145)
SODIUM SERPL-SCNC: 148 MMOL/L (ref 135–147)
SP GR UR STRIP: 1.01 (ref 1–1.03)
SP GR UR: 1.01 (ref 1–1.03)
SPECIMEN SOURCE: ABNORMAL
SQUAMOUS #/AREA URNS HPF: ABNORMAL /HPF
SQUAMOUS SPT QL MICRO: ABNORMAL /[HPF]
STOMATOCYTES BLD QL SMEAR: NORMAL
TIBC SERPL-MCNC: 377 MCG/DL (ref 298–536)
TIBC SERPL-MCNC: 392 MCG/DL (ref 298–536)
TRANSFERRIN SERPL-MCNC: 253 MG/DL (ref 200–360)
TRANSFERRIN SERPL-MCNC: 263 MG/DL (ref 200–360)
TRIGL SERPL-MCNC: 75 MG/DL (ref 40–150)
TROPONIN T SERPL-MCNC: 0.09 NG/ML (ref 0–0.03)
TROPONIN T SERPL-MCNC: 0.15 NG/ML (ref 0–0.03)
UROBILINOGEN UR QL STRIP: ABNORMAL
VLDLC SERPL-MCNC: 15 MG/DL (ref 5–37)
WBC # BLD AUTO: 4.53 10*3/UL (ref 4.8–10.8)
WBC # BLD AUTO: 5.28 10*3/MM3 (ref 3.4–10.8)
WBC # BLD AUTO: 5.28 10*3/MM3 (ref 3.4–10.8)
WBC # BLD AUTO: 5.74 10*3/UL (ref 4.8–10.8)
WBC # FLD AUTO: 600 /UL
WBC # UR STRIP: ABNORMAL /HPF
WBC #/AREA URNS HPF: ABNORMAL /[HPF]
WBC MORPH BLD: NORMAL
WBC NRBC COR # BLD: 5.67 10*3/MM3 (ref 3.4–10.8)
WBC NRBC COR # BLD: 6.56 10*3/MM3 (ref 3.4–10.8)
WBC NRBC COR # BLD: 8.68 10*3/MM3 (ref 3.4–10.8)
WHOLE BLOOD HOLD SPECIMEN: NORMAL
WHOLE BLOOD HOLD SPECIMEN: NORMAL

## 2021-01-01 PROCEDURE — 94640 AIRWAY INHALATION TREATMENT: CPT

## 2021-01-01 PROCEDURE — 11721 DEBRIDE NAIL 6 OR MORE: CPT | Performed by: PODIATRIST

## 2021-01-01 PROCEDURE — 82962 GLUCOSE BLOOD TEST: CPT

## 2021-01-01 PROCEDURE — 25010000002 EPOETIN ALFA PER 1000 UNITS: Performed by: INTERNAL MEDICINE

## 2021-01-01 PROCEDURE — 36415 COLL VENOUS BLD VENIPUNCTURE: CPT

## 2021-01-01 PROCEDURE — 80053 COMPREHEN METABOLIC PANEL: CPT

## 2021-01-01 PROCEDURE — 83540 ASSAY OF IRON: CPT

## 2021-01-01 PROCEDURE — G8404 LOW EXTEMITY NEUR EXAM DOCUM: HCPCS | Performed by: PODIATRIST

## 2021-01-01 PROCEDURE — 93280 PM DEVICE PROGR EVAL DUAL: CPT | Performed by: INTERNAL MEDICINE

## 2021-01-01 PROCEDURE — U0004 COV-19 TEST NON-CDC HGH THRU: HCPCS | Performed by: EMERGENCY MEDICINE

## 2021-01-01 PROCEDURE — 99223 1ST HOSP IP/OBS HIGH 75: CPT | Performed by: FAMILY MEDICINE

## 2021-01-01 PROCEDURE — 99213 OFFICE O/P EST LOW 20 MIN: CPT | Performed by: FAMILY MEDICINE

## 2021-01-01 PROCEDURE — 25010000002 METHYLPREDNISOLONE PER 40 MG: Performed by: INTERNAL MEDICINE

## 2021-01-01 PROCEDURE — 85025 COMPLETE CBC W/AUTO DIFF WBC: CPT

## 2021-01-01 PROCEDURE — 94799 UNLISTED PULMONARY SVC/PX: CPT

## 2021-01-01 PROCEDURE — 93306 TTE W/DOPPLER COMPLETE: CPT

## 2021-01-01 PROCEDURE — 87070 CULTURE OTHR SPECIMN AEROBIC: CPT | Performed by: FAMILY MEDICINE

## 2021-01-01 PROCEDURE — 99221 1ST HOSP IP/OBS SF/LOW 40: CPT | Performed by: INTERNAL MEDICINE

## 2021-01-01 PROCEDURE — 80048 BASIC METABOLIC PNL TOTAL CA: CPT | Performed by: FAMILY MEDICINE

## 2021-01-01 PROCEDURE — 83605 ASSAY OF LACTIC ACID: CPT

## 2021-01-01 PROCEDURE — 25010000002 FUROSEMIDE PER 20 MG: Performed by: EMERGENCY MEDICINE

## 2021-01-01 PROCEDURE — 99214 OFFICE O/P EST MOD 30 MIN: CPT | Performed by: NURSE PRACTITIONER

## 2021-01-01 PROCEDURE — 87899 AGENT NOS ASSAY W/OPTIC: CPT | Performed by: FAMILY MEDICINE

## 2021-01-01 PROCEDURE — 63710000001 INSULIN LISPRO (HUMAN) PER 5 UNITS: Performed by: FAMILY MEDICINE

## 2021-01-01 PROCEDURE — 99233 SBSQ HOSP IP/OBS HIGH 50: CPT | Performed by: INTERNAL MEDICINE

## 2021-01-01 PROCEDURE — 99214 OFFICE O/P EST MOD 30 MIN: CPT | Performed by: INTERNAL MEDICINE

## 2021-01-01 PROCEDURE — 81001 URINALYSIS AUTO W/SCOPE: CPT | Performed by: PHYSICIAN ASSISTANT

## 2021-01-01 PROCEDURE — 80076 HEPATIC FUNCTION PANEL: CPT | Performed by: FAMILY MEDICINE

## 2021-01-01 PROCEDURE — 82306 VITAMIN D 25 HYDROXY: CPT

## 2021-01-01 PROCEDURE — 84466 ASSAY OF TRANSFERRIN: CPT

## 2021-01-01 PROCEDURE — 85025 COMPLETE CBC W/AUTO DIFF WBC: CPT | Performed by: EMERGENCY MEDICINE

## 2021-01-01 PROCEDURE — 92610 EVALUATE SWALLOWING FUNCTION: CPT

## 2021-01-01 PROCEDURE — G0378 HOSPITAL OBSERVATION PER HR: HCPCS

## 2021-01-01 PROCEDURE — 25010000002 CEFTRIAXONE PER 250 MG: Performed by: FAMILY MEDICINE

## 2021-01-01 PROCEDURE — 93005 ELECTROCARDIOGRAM TRACING: CPT

## 2021-01-01 PROCEDURE — 93306 TTE W/DOPPLER COMPLETE: CPT | Performed by: INTERNAL MEDICINE

## 2021-01-01 PROCEDURE — 84100 ASSAY OF PHOSPHORUS: CPT | Performed by: FAMILY MEDICINE

## 2021-01-01 PROCEDURE — 99213 OFFICE O/P EST LOW 20 MIN: CPT | Performed by: PODIATRIST

## 2021-01-01 PROCEDURE — 86738 MYCOPLASMA ANTIBODY: CPT | Performed by: FAMILY MEDICINE

## 2021-01-01 PROCEDURE — 90662 IIV NO PRSV INCREASED AG IM: CPT | Performed by: FAMILY MEDICINE

## 2021-01-01 PROCEDURE — 85007 BL SMEAR W/DIFF WBC COUNT: CPT | Performed by: FAMILY MEDICINE

## 2021-01-01 PROCEDURE — 93005 ELECTROCARDIOGRAM TRACING: CPT | Performed by: EMERGENCY MEDICINE

## 2021-01-01 PROCEDURE — 36415 COLL VENOUS BLD VENIPUNCTURE: CPT | Performed by: FAMILY MEDICINE

## 2021-01-01 PROCEDURE — 85025 COMPLETE CBC W/AUTO DIFF WBC: CPT | Performed by: FAMILY MEDICINE

## 2021-01-01 PROCEDURE — 87086 URINE CULTURE/COLONY COUNT: CPT | Performed by: PHYSICIAN ASSISTANT

## 2021-01-01 PROCEDURE — 84484 ASSAY OF TROPONIN QUANT: CPT | Performed by: EMERGENCY MEDICINE

## 2021-01-01 PROCEDURE — 25010000002 AZITHROMYCIN PER 500 MG: Performed by: FAMILY MEDICINE

## 2021-01-01 PROCEDURE — 99213 OFFICE O/P EST LOW 20 MIN: CPT | Performed by: SPECIALIST

## 2021-01-01 PROCEDURE — 83735 ASSAY OF MAGNESIUM: CPT | Performed by: FAMILY MEDICINE

## 2021-01-01 PROCEDURE — 25010000002 FUROSEMIDE PER 20 MG: Performed by: FAMILY MEDICINE

## 2021-01-01 PROCEDURE — 25010000002 IRON SUCROSE PER 1 MG: Performed by: INTERNAL MEDICINE

## 2021-01-01 PROCEDURE — 83880 ASSAY OF NATRIURETIC PEPTIDE: CPT

## 2021-01-01 PROCEDURE — 87205 SMEAR GRAM STAIN: CPT | Performed by: FAMILY MEDICINE

## 2021-01-01 PROCEDURE — 99233 SBSQ HOSP IP/OBS HIGH 50: CPT | Performed by: FAMILY MEDICINE

## 2021-01-01 PROCEDURE — G0008 ADMIN INFLUENZA VIRUS VAC: HCPCS | Performed by: FAMILY MEDICINE

## 2021-01-01 PROCEDURE — 99285 EMERGENCY DEPT VISIT HI MDM: CPT

## 2021-01-01 PROCEDURE — U0005 INFEC AGEN DETEC AMPLI PROBE: HCPCS | Performed by: EMERGENCY MEDICINE

## 2021-01-01 PROCEDURE — 25010000002 CEFTRIAXONE PER 250 MG: Performed by: EMERGENCY MEDICINE

## 2021-01-01 PROCEDURE — 71045 X-RAY EXAM CHEST 1 VIEW: CPT

## 2021-01-01 PROCEDURE — 25010000002 ONDANSETRON PER 1 MG: Performed by: FAMILY MEDICINE

## 2021-01-01 PROCEDURE — 87040 BLOOD CULTURE FOR BACTERIA: CPT | Performed by: EMERGENCY MEDICINE

## 2021-01-01 RX ORDER — INSULIN LISPRO 100 [IU]/ML
1 INJECTION, SOLUTION INTRAVENOUS; SUBCUTANEOUS TAKE AS DIRECTED
Qty: 3 PEN | Refills: 0 | Status: SHIPPED | OUTPATIENT
Start: 2021-01-01 | End: 2021-01-01

## 2021-01-01 RX ORDER — POTASSIUM CHLORIDE 600 MG/1
8 TABLET, FILM COATED, EXTENDED RELEASE ORAL DAILY
Qty: 30 TABLET | Refills: 0 | Status: SHIPPED | OUTPATIENT
Start: 2021-01-01 | End: 2021-01-01 | Stop reason: SDUPTHER

## 2021-01-01 RX ORDER — CARVEDILOL 6.25 MG/1
6.25 TABLET ORAL
COMMUNITY
End: 2021-01-01 | Stop reason: SDUPTHER

## 2021-01-01 RX ORDER — POLYETHYLENE GLYCOL 3350 17 G/17G
17 POWDER, FOR SOLUTION ORAL DAILY PRN
Status: DISCONTINUED | OUTPATIENT
Start: 2021-01-01 | End: 2022-01-01 | Stop reason: HOSPADM

## 2021-01-01 RX ORDER — CEFTRIAXONE SODIUM 1 G/50ML
1 INJECTION, SOLUTION INTRAVENOUS EVERY 24 HOURS
Status: DISCONTINUED | OUTPATIENT
Start: 2021-01-01 | End: 2022-01-01 | Stop reason: HOSPADM

## 2021-01-01 RX ORDER — KETOCONAZOLE 20 MG/G
CREAM TOPICAL AS NEEDED
COMMUNITY
Start: 2021-01-01 | End: 2021-01-01

## 2021-01-01 RX ORDER — FLUCONAZOLE 150 MG/1
TABLET ORAL
Qty: 14 TABLET | Refills: 0 | Status: SHIPPED | OUTPATIENT
Start: 2021-01-01 | End: 2021-01-01

## 2021-01-01 RX ORDER — CARVEDILOL 6.25 MG/1
6.25 TABLET ORAL 2 TIMES DAILY WITH MEALS
Status: DISCONTINUED | OUTPATIENT
Start: 2021-01-01 | End: 2022-01-01 | Stop reason: HOSPADM

## 2021-01-01 RX ORDER — FERROUS SULFATE 325(65) MG
325 TABLET ORAL
COMMUNITY

## 2021-01-01 RX ORDER — SODIUM CHLORIDE FOR INHALATION 3 %
4 VIAL, NEBULIZER (ML) INHALATION
Qty: 720 ML | Refills: 3 | Status: SHIPPED | OUTPATIENT
Start: 2021-01-01 | End: 2021-01-01

## 2021-01-01 RX ORDER — MONTELUKAST SODIUM 10 MG/1
TABLET ORAL
COMMUNITY
End: 2021-01-01

## 2021-01-01 RX ORDER — LOSARTAN POTASSIUM 25 MG/1
TABLET ORAL
COMMUNITY
Start: 2021-02-11 | End: 2021-01-01 | Stop reason: SDUPTHER

## 2021-01-01 RX ORDER — ASPIRIN 81 MG/1
324 TABLET, CHEWABLE ORAL ONCE
Status: COMPLETED | OUTPATIENT
Start: 2021-01-01 | End: 2021-01-01

## 2021-01-01 RX ORDER — POTASSIUM CHLORIDE 600 MG/1
TABLET, FILM COATED, EXTENDED RELEASE ORAL
Qty: 30 TABLET | Refills: 0 | Status: SHIPPED | OUTPATIENT
Start: 2021-01-01 | End: 2022-01-01

## 2021-01-01 RX ORDER — LOVASTATIN 40 MG/1
TABLET ORAL
COMMUNITY
End: 2021-01-01

## 2021-01-01 RX ORDER — GABAPENTIN 100 MG/1
CAPSULE ORAL
COMMUNITY
End: 2021-01-01

## 2021-01-01 RX ORDER — LEVALBUTEROL INHALATION SOLUTION 1.25 MG/3ML
3 SOLUTION RESPIRATORY (INHALATION)
COMMUNITY
End: 2021-01-01

## 2021-01-01 RX ORDER — ARFORMOTEROL TARTRATE 15 UG/2ML
15 SOLUTION RESPIRATORY (INHALATION)
Status: DISCONTINUED | OUTPATIENT
Start: 2021-01-01 | End: 2022-01-01 | Stop reason: HOSPADM

## 2021-01-01 RX ORDER — SODIUM CHLORIDE 0.9 % (FLUSH) 0.9 %
10 SYRINGE (ML) INJECTION EVERY 12 HOURS SCHEDULED
Status: DISCONTINUED | OUTPATIENT
Start: 2021-01-01 | End: 2022-01-01 | Stop reason: HOSPADM

## 2021-01-01 RX ORDER — NICOTINE POLACRILEX 4 MG
15 LOZENGE BUCCAL
Status: DISCONTINUED | OUTPATIENT
Start: 2021-01-01 | End: 2022-01-01 | Stop reason: HOSPADM

## 2021-01-01 RX ORDER — GENTAMICIN SULFATE 1 MG/G
1 OINTMENT TOPICAL 2 TIMES DAILY
COMMUNITY
End: 2021-01-01

## 2021-01-01 RX ORDER — ALBUTEROL SULFATE 90 UG/1
2 AEROSOL, METERED RESPIRATORY (INHALATION) EVERY 6 HOURS PRN
COMMUNITY

## 2021-01-01 RX ORDER — FLUTICASONE FUROATE 100 UG/1
POWDER RESPIRATORY (INHALATION)
COMMUNITY
End: 2021-01-01

## 2021-01-01 RX ORDER — CARVEDILOL 6.25 MG/1
6.25 TABLET ORAL 2 TIMES DAILY WITH MEALS
Qty: 180 TABLET | Refills: 3 | Status: SHIPPED | OUTPATIENT
Start: 2021-01-01

## 2021-01-01 RX ORDER — BUDESONIDE 0.5 MG/2ML
0.5 INHALANT ORAL
Status: DISCONTINUED | OUTPATIENT
Start: 2021-01-01 | End: 2022-01-01 | Stop reason: HOSPADM

## 2021-01-01 RX ORDER — BENZONATATE 100 MG/1
100 CAPSULE ORAL 3 TIMES DAILY PRN
Status: DISCONTINUED | OUTPATIENT
Start: 2021-01-01 | End: 2022-01-01 | Stop reason: HOSPADM

## 2021-01-01 RX ORDER — ERGOCALCIFEROL 1.25 MG/1
50000 CAPSULE ORAL WEEKLY
COMMUNITY
Start: 2021-01-01 | End: 2022-01-01

## 2021-01-01 RX ORDER — INSULIN LISPRO 100 [IU]/ML
1 INJECTION, SOLUTION INTRAVENOUS; SUBCUTANEOUS TAKE AS DIRECTED
COMMUNITY
Start: 2021-01-01 | End: 2021-01-01 | Stop reason: SDUPTHER

## 2021-01-01 RX ORDER — DEXTROSE MONOHYDRATE 100 MG/ML
25 INJECTION, SOLUTION INTRAVENOUS
Status: DISCONTINUED | OUTPATIENT
Start: 2021-01-01 | End: 2022-01-01 | Stop reason: HOSPADM

## 2021-01-01 RX ORDER — FLUCONAZOLE 150 MG/1
TABLET ORAL
COMMUNITY
Start: 2021-01-01 | End: 2021-01-01

## 2021-01-01 RX ORDER — IPRATROPIUM BROMIDE AND ALBUTEROL SULFATE 2.5; .5 MG/3ML; MG/3ML
3 SOLUTION RESPIRATORY (INHALATION)
Status: DISCONTINUED | OUTPATIENT
Start: 2021-01-01 | End: 2022-01-01 | Stop reason: HOSPADM

## 2021-01-01 RX ORDER — POTASSIUM CHLORIDE 600 MG/1
8 TABLET, FILM COATED, EXTENDED RELEASE ORAL DAILY
Qty: 30 TABLET | Refills: 0 | Status: SHIPPED | OUTPATIENT
Start: 2021-01-01 | End: 2021-01-01

## 2021-01-01 RX ORDER — METOLAZONE 5 MG/1
5 TABLET ORAL
COMMUNITY
End: 2021-01-01

## 2021-01-01 RX ORDER — FLUCONAZOLE 150 MG/1
150 TABLET ORAL DAILY
Qty: 7 TABLET | Refills: 0 | Status: SHIPPED | OUTPATIENT
Start: 2021-01-01 | End: 2021-01-01

## 2021-01-01 RX ORDER — ACETAMINOPHEN 325 MG/1
650 TABLET ORAL EVERY 4 HOURS PRN
Status: DISCONTINUED | OUTPATIENT
Start: 2021-01-01 | End: 2022-01-01 | Stop reason: HOSPADM

## 2021-01-01 RX ORDER — ONDANSETRON 2 MG/ML
4 INJECTION INTRAMUSCULAR; INTRAVENOUS EVERY 6 HOURS PRN
Status: DISCONTINUED | OUTPATIENT
Start: 2021-01-01 | End: 2022-01-01 | Stop reason: HOSPADM

## 2021-01-01 RX ORDER — METHYLPREDNISOLONE SODIUM SUCCINATE 40 MG/ML
40 INJECTION, POWDER, LYOPHILIZED, FOR SOLUTION INTRAMUSCULAR; INTRAVENOUS EVERY 12 HOURS
Status: DISCONTINUED | OUTPATIENT
Start: 2021-01-01 | End: 2022-01-01

## 2021-01-01 RX ORDER — PSEUDOEPHEDRINE HCL 30 MG
TABLET ORAL
COMMUNITY
End: 2021-01-01

## 2021-01-01 RX ORDER — POTASSIUM CHLORIDE 600 MG/1
1 TABLET, FILM COATED, EXTENDED RELEASE ORAL DAILY
COMMUNITY
Start: 2021-01-01 | End: 2021-01-01 | Stop reason: SDUPTHER

## 2021-01-01 RX ORDER — SODIUM CHLORIDE 0.9 % (FLUSH) 0.9 %
10 SYRINGE (ML) INJECTION AS NEEDED
Status: DISCONTINUED | OUTPATIENT
Start: 2021-01-01 | End: 2022-01-01 | Stop reason: HOSPADM

## 2021-01-01 RX ORDER — SIMETHICONE 62.5 MG
STRIP ORAL
COMMUNITY
End: 2021-01-01

## 2021-01-01 RX ORDER — FERROUS SULFATE 325(65) MG
325 TABLET ORAL
COMMUNITY
Start: 2021-01-01 | End: 2021-01-01

## 2021-01-01 RX ORDER — FLUCONAZOLE 150 MG/1
150 TABLET ORAL ONCE
Qty: 14 TABLET | Refills: 0 | Status: SHIPPED | OUTPATIENT
Start: 2021-01-01 | End: 2021-01-01

## 2021-01-01 RX ORDER — AZITHROMYCIN 250 MG/1
TABLET, FILM COATED ORAL
Qty: 6 TABLET | Refills: 0 | Status: SHIPPED | OUTPATIENT
Start: 2021-01-01 | End: 2021-01-01

## 2021-01-01 RX ORDER — AMOXICILLIN AND CLAVULANATE POTASSIUM 875; 125 MG/1; MG/1
1 TABLET, FILM COATED ORAL 2 TIMES DAILY
Qty: 14 TABLET | Refills: 0 | Status: SHIPPED | OUTPATIENT
Start: 2021-01-01 | End: 2021-01-01

## 2021-01-01 RX ORDER — ALLOPURINOL 300 MG/1
TABLET ORAL
COMMUNITY
End: 2021-01-01

## 2021-01-01 RX ORDER — ASPIRIN 81 MG/1
81 TABLET ORAL DAILY
COMMUNITY

## 2021-01-01 RX ORDER — FUROSEMIDE 10 MG/ML
40 INJECTION INTRAMUSCULAR; INTRAVENOUS ONCE
Status: COMPLETED | OUTPATIENT
Start: 2021-01-01 | End: 2021-01-01

## 2021-01-01 RX ORDER — ASPIRIN 81 MG/1
81 TABLET ORAL DAILY
Status: DISCONTINUED | OUTPATIENT
Start: 2021-01-01 | End: 2022-01-01 | Stop reason: HOSPADM

## 2021-01-01 RX ORDER — LOSARTAN POTASSIUM 25 MG/1
25 TABLET ORAL DAILY
Qty: 90 TABLET | Refills: 3 | Status: SHIPPED | OUTPATIENT
Start: 2021-01-01

## 2021-01-01 RX ORDER — FUROSEMIDE 40 MG/1
TABLET ORAL
COMMUNITY
Start: 2021-02-08 | End: 2021-01-01

## 2021-01-01 RX ORDER — AMOXICILLIN 250 MG
2 CAPSULE ORAL 2 TIMES DAILY
Status: DISCONTINUED | OUTPATIENT
Start: 2021-01-01 | End: 2022-01-01 | Stop reason: HOSPADM

## 2021-01-01 RX ORDER — AMLODIPINE BESYLATE 5 MG/1
TABLET ORAL
COMMUNITY
End: 2021-01-01

## 2021-01-01 RX ORDER — BISACODYL 10 MG
10 SUPPOSITORY, RECTAL RECTAL DAILY PRN
Status: DISCONTINUED | OUTPATIENT
Start: 2021-01-01 | End: 2022-01-01 | Stop reason: HOSPADM

## 2021-01-01 RX ORDER — ALUMINA, MAGNESIA, AND SIMETHICONE 2400; 2400; 240 MG/30ML; MG/30ML; MG/30ML
15 SUSPENSION ORAL EVERY 6 HOURS PRN
Status: DISCONTINUED | OUTPATIENT
Start: 2021-01-01 | End: 2022-01-01 | Stop reason: HOSPADM

## 2021-01-01 RX ORDER — FUROSEMIDE 10 MG/ML
60 INJECTION INTRAMUSCULAR; INTRAVENOUS EVERY 12 HOURS
Status: DISCONTINUED | OUTPATIENT
Start: 2021-01-01 | End: 2022-01-01

## 2021-01-01 RX ORDER — PREDNISONE 10 MG/1
TABLET ORAL
COMMUNITY
End: 2021-01-01

## 2021-01-01 RX ORDER — CEFTRIAXONE SODIUM 1 G/50ML
1 INJECTION, SOLUTION INTRAVENOUS ONCE
Status: COMPLETED | OUTPATIENT
Start: 2021-01-01 | End: 2021-01-01

## 2021-01-01 RX ORDER — LEVALBUTEROL INHALATION SOLUTION 1.25 MG/3ML
SOLUTION RESPIRATORY (INHALATION)
Qty: 900 ML | Refills: 0 | Status: SHIPPED | OUTPATIENT
Start: 2021-01-01 | End: 2022-01-01

## 2021-01-01 RX ORDER — BISACODYL 5 MG/1
5 TABLET, DELAYED RELEASE ORAL DAILY PRN
Status: DISCONTINUED | OUTPATIENT
Start: 2021-01-01 | End: 2022-01-01 | Stop reason: HOSPADM

## 2021-01-01 RX ADMIN — FUROSEMIDE 60 MG: 10 INJECTION INTRAMUSCULAR; INTRAVENOUS at 04:50

## 2021-01-01 RX ADMIN — ERYTHROPOIETIN 10000 UNITS: 10000 INJECTION, SOLUTION INTRAVENOUS; SUBCUTANEOUS at 11:55

## 2021-01-01 RX ADMIN — ARFORMOTEROL TARTRATE 15 MCG: 15 SOLUTION RESPIRATORY (INHALATION) at 09:12

## 2021-01-01 RX ADMIN — AZITHROMYCIN DIHYDRATE 500 MG: 500 INJECTION, POWDER, LYOPHILIZED, FOR SOLUTION INTRAVENOUS at 17:01

## 2021-01-01 RX ADMIN — METHYLPREDNISOLONE SODIUM SUCCINATE 40 MG: 40 INJECTION, POWDER, FOR SOLUTION INTRAMUSCULAR; INTRAVENOUS at 11:55

## 2021-01-01 RX ADMIN — ONDANSETRON 4 MG: 2 INJECTION INTRAMUSCULAR; INTRAVENOUS at 19:28

## 2021-01-01 RX ADMIN — ASPIRIN 81 MG: 81 TABLET, COATED ORAL at 08:31

## 2021-01-01 RX ADMIN — BENZONATATE 100 MG: 100 CAPSULE ORAL at 19:19

## 2021-01-01 RX ADMIN — IRON SUCROSE 200 MG: 20 INJECTION, SOLUTION INTRAVENOUS at 11:55

## 2021-01-01 RX ADMIN — SODIUM CHLORIDE, PRESERVATIVE FREE 10 ML: 5 INJECTION INTRAVENOUS at 21:54

## 2021-01-01 RX ADMIN — BENZONATATE 100 MG: 100 CAPSULE ORAL at 05:55

## 2021-01-01 RX ADMIN — CARVEDILOL 6.25 MG: 6.25 TABLET, FILM COATED ORAL at 17:01

## 2021-01-01 RX ADMIN — CEFTRIAXONE SODIUM 1 G: 1 INJECTION, SOLUTION INTRAVENOUS at 15:38

## 2021-01-01 RX ADMIN — CEFTRIAXONE SODIUM 1 G: 1 INJECTION, SOLUTION INTRAVENOUS at 08:31

## 2021-01-01 RX ADMIN — ARFORMOTEROL TARTRATE 15 MCG: 15 SOLUTION RESPIRATORY (INHALATION) at 18:27

## 2021-01-01 RX ADMIN — AZITHROMYCIN DIHYDRATE 500 MG: 500 INJECTION, POWDER, LYOPHILIZED, FOR SOLUTION INTRAVENOUS at 16:59

## 2021-01-01 RX ADMIN — IPRATROPIUM BROMIDE AND ALBUTEROL SULFATE 3 ML: 2.5; .5 SOLUTION RESPIRATORY (INHALATION) at 18:27

## 2021-01-01 RX ADMIN — FUROSEMIDE 60 MG: 10 INJECTION INTRAMUSCULAR; INTRAVENOUS at 17:00

## 2021-01-01 RX ADMIN — IPRATROPIUM BROMIDE AND ALBUTEROL SULFATE 3 ML: 2.5; .5 SOLUTION RESPIRATORY (INHALATION) at 17:13

## 2021-01-01 RX ADMIN — CARVEDILOL 6.25 MG: 6.25 TABLET, FILM COATED ORAL at 08:31

## 2021-01-01 RX ADMIN — SENNOSIDES AND DOCUSATE SODIUM 2 TABLET: 50; 8.6 TABLET ORAL at 19:28

## 2021-01-01 RX ADMIN — BUDESONIDE 0.5 MG: 0.5 SUSPENSION RESPIRATORY (INHALATION) at 09:05

## 2021-01-01 RX ADMIN — CARVEDILOL 6.25 MG: 6.25 TABLET, FILM COATED ORAL at 17:00

## 2021-01-01 RX ADMIN — ASPIRIN 81 MG CHEWABLE TABLET 324 MG: 81 TABLET CHEWABLE at 15:30

## 2021-01-01 RX ADMIN — BUDESONIDE 0.5 MG: 0.5 SUSPENSION RESPIRATORY (INHALATION) at 18:27

## 2021-01-01 RX ADMIN — IPRATROPIUM BROMIDE AND ALBUTEROL SULFATE 3 ML: 2.5; .5 SOLUTION RESPIRATORY (INHALATION) at 12:43

## 2021-01-01 RX ADMIN — FUROSEMIDE 40 MG: 10 INJECTION, SOLUTION INTRAMUSCULAR; INTRAVENOUS at 15:29

## 2021-01-01 RX ADMIN — IPRATROPIUM BROMIDE AND ALBUTEROL SULFATE 3 ML: 2.5; .5 SOLUTION RESPIRATORY (INHALATION) at 08:48

## 2021-01-01 RX ADMIN — SODIUM CHLORIDE, PRESERVATIVE FREE 10 ML: 5 INJECTION INTRAVENOUS at 08:31

## 2021-01-01 RX ADMIN — IPRATROPIUM BROMIDE AND ALBUTEROL SULFATE 3 ML: 2.5; .5 SOLUTION RESPIRATORY (INHALATION) at 01:49

## 2021-01-01 RX ADMIN — INSULIN LISPRO 3 UNITS: 100 INJECTION, SOLUTION INTRAVENOUS; SUBCUTANEOUS at 17:55

## 2021-01-21 ENCOUNTER — HOSPITAL ENCOUNTER (OUTPATIENT)
Dept: FAMILY MEDICINE CLINIC | Facility: CLINIC | Age: 86
Discharge: HOME | End: 2021-01-21
Attending: INTERNAL MEDICINE

## 2021-01-21 LAB
ALBUMIN SERPL-MCNC: 4.1 G/DL (ref 3.5–5)
ALBUMIN/GLOB SERPL: 1.7 {RATIO} (ref 1.4–2.6)
ALP SERPL-CCNC: 127 U/L (ref 38–185)
ALT SERPL-CCNC: 9 U/L (ref 10–40)
ANION GAP SERPL CALC-SCNC: 27 MMOL/L (ref 8–19)
AST SERPL-CCNC: 19 U/L (ref 15–50)
BASOPHILS # BLD AUTO: 0.03 10*3/UL (ref 0–0.2)
BASOPHILS NFR BLD AUTO: 0.5 % (ref 0–3)
BILIRUB SERPL-MCNC: 0.29 MG/DL (ref 0.2–1.3)
BUN SERPL-MCNC: 63 MG/DL (ref 5–25)
BUN/CREAT SERPL: 37 {RATIO} (ref 6–20)
CALCIUM SERPL-MCNC: 9.3 MG/DL (ref 8.7–10.4)
CHLORIDE SERPL-SCNC: 109 MMOL/L (ref 99–111)
CONV ABS IMM GRAN: 0.01 10*3/UL (ref 0–0.2)
CONV CO2: 17 MMOL/L (ref 22–32)
CONV IMMATURE GRAN: 0.2 % (ref 0–1.8)
CONV TOTAL PROTEIN: 6.5 G/DL (ref 6.3–8.2)
CREAT UR-MCNC: 1.7 MG/DL (ref 0.5–0.9)
DEPRECATED RDW RBC AUTO: 52.1 FL (ref 36.4–46.3)
EOSINOPHIL # BLD AUTO: 0.19 10*3/UL (ref 0–0.7)
EOSINOPHIL # BLD AUTO: 3.2 % (ref 0–7)
ERYTHROCYTE [DISTWIDTH] IN BLOOD BY AUTOMATED COUNT: 15.9 % (ref 11.7–14.4)
GFR SERPLBLD BASED ON 1.73 SQ M-ARVRAT: 29 ML/MIN/{1.73_M2}
GLOBULIN UR ELPH-MCNC: 2.4 G/DL (ref 2–3.5)
GLUCOSE SERPL-MCNC: 116 MG/DL (ref 65–99)
HCT VFR BLD AUTO: 40.4 % (ref 37–47)
HGB BLD-MCNC: 12.4 G/DL (ref 12–16)
LYMPHOCYTES # BLD AUTO: 2.07 10*3/UL (ref 1–5)
LYMPHOCYTES NFR BLD AUTO: 34.6 % (ref 20–45)
MCH RBC QN AUTO: 27.3 PG (ref 27–31)
MCHC RBC AUTO-ENTMCNC: 30.7 G/DL (ref 33–37)
MCV RBC AUTO: 89 FL (ref 81–99)
MONOCYTES # BLD AUTO: 0.48 10*3/UL (ref 0.2–1.2)
MONOCYTES NFR BLD AUTO: 8 % (ref 3–10)
NEUTROPHILS # BLD AUTO: 3.21 10*3/UL (ref 2–8)
NEUTROPHILS NFR BLD AUTO: 53.5 % (ref 30–85)
NRBC CBCN: 0 % (ref 0–0.7)
OSMOLALITY SERPL CALC.SUM OF ELEC: 325 MOSM/KG (ref 273–304)
PHOSPHATE SERPL-MCNC: 4.5 MG/DL (ref 2.4–4.5)
PLATELET # BLD AUTO: 194 10*3/UL (ref 130–400)
PMV BLD AUTO: 11.4 FL (ref 9.4–12.3)
POTASSIUM SERPL-SCNC: 5.2 MMOL/L (ref 3.5–5.3)
RBC # BLD AUTO: 4.54 10*6/UL (ref 4.2–5.4)
SODIUM SERPL-SCNC: 148 MMOL/L (ref 135–147)
WBC # BLD AUTO: 5.99 10*3/UL (ref 4.8–10.8)

## 2021-02-10 ENCOUNTER — HOSPITAL ENCOUNTER (OUTPATIENT)
Dept: VACCINE CLINIC | Facility: HOSPITAL | Age: 86
Discharge: HOME | End: 2021-02-10
Attending: INTERNAL MEDICINE

## 2021-02-11 ENCOUNTER — TELEMEDICINE CONVERTED (OUTPATIENT)
Dept: CARDIOLOGY | Facility: CLINIC | Age: 86
End: 2021-02-11
Attending: INTERNAL MEDICINE

## 2021-02-23 ENCOUNTER — PROCEDURE VISIT CONVERTED (OUTPATIENT)
Dept: PODIATRY | Facility: CLINIC | Age: 86
End: 2021-02-23
Attending: PODIATRIST

## 2021-05-10 NOTE — H&P
History and Physical      Patient Name: Carrington Ledbetter   Patient ID: [id number]   Sex: Female   YOB: 1925    Primary Care Provider: DAYDAY MARY DO   Referring Provider: Cosmo Garcia DPM    Visit Date: July 22, 2020    Provider: Scout Shultz MD   Location: [facility]   Location Address: [address] [place] [address]  [id number]   Location Phone: [phone]          Chief Complaint     Congestive heart failure.       History Of Present Illness  Consult requested by: Cosmo Garcia DPM   Carrington Ledbetter is a 89 year old female with a history of end-stage renal disease, recently started on dialysis, and also diagnosed with worsening congestive heart failure and volume retention, starting at the end of May 2020 with an ejection fraction of 30% noted on echocardiogram. The patient does report some improvement in lower extremity edema, but still very persistent shortness of breath and limited exercise capacity. No chest pain issues. Weight has decreased some.   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   FAMILY MEDICAL HISTORY: Significant for premature CAD on her mom's side.   PSYCHOSOCIAL HISTORY: Previously smoked, but quit. Denies alcohol use. Denies caffeine intake. . Admits mood changes and depression.   CURRENT MEDICATIONS: Calcium acetate 667 mg t.i.d.; carvedilol 12.5 mg 1/2 b.i.d.; Colace 100 mg b.i.d. p.r.n.; pantoprazole 40 mg daily; montelukast 10 mg daily; Anoro Ellipta; allopurinol 300 mg q. h.s.; aspirin 81 mg daily; furosemide 40 mg daily; Tresiba; Humalog; mirtazapine 7.5 mg 1/2 daily; levalbuterol inhaler.   ALLERGIES: No known drug allergies.       Review of Systems  · Constitutional  o Admits  o : fatigue  o Denies  o :  "good general health lately, recent weight changes   · Eyes  o Admits  o : blurred vision  · HENT  o Denies  o : hearing loss or ringing, chronic sinus problem, swollen glands in neck  · Cardiovascular  o Admits  o : palpitations (fast, fluttering, or skipping beats), swelling (feet, ankles, hands), shortness of breath while walking or lying flat  o Denies  o : chest pain  · Respiratory  o Admits  o : asthma or wheezing, COPD  · Gastrointestinal  o Denies  o : ulcers, nausea or vomiting  · Neurologic  o Admits  o : lightheaded or dizzy  o Denies  o : stroke, headaches  · Musculoskeletal  o Admits  o : joint pain, back pain  · Endocrine  o Admits  o : diabetes, heat or cold intolerance  o Denies  o : thyroid disease, excessive thirst or urination  · Heme-Lymph  o Denies  o : bleeding or bruising tendency, anemia      Vitals  Date Time BP Position Site L\R Cuff Size HR RR TEMP (F) WT  HT  BMI kg/m2 BSA m2 O2 Sat        07/22/2020 12:04 /66 Sitting    88 - R   150lbs 8oz 4'  11\" 30.4 1.69           Physical Examination  · Constitutional  o Appearance  o : Awake, alert, in no acute distress.   · Head and Face  o HEENT  o : PERRLA.  · Eyes  o Conjunctivae  o : Normal.  · Ears, Nose, Mouth and Throat  o Oral Cavity  o :   § Oral Mucosa  § : Normal.  · Neck  o Inspection/Palpation  o : No JVD.  · Respiratory  o Respiratory  o : Bilateral wheezes and crackles.  · Cardiovascular  o Heart  o :   § Auscultation of Heart  § : Regular rate and rhythm with positive S4.  o Peripheral Vascular System  o :   § Extremities  § : +1 lower extremity edema.  · Gastrointestinal  o Abdominal Examination  o : Abdomen soft. No masses. No guarding or rigidity. No hepatosplenomegaly. Bowel sounds normal.  · Musculoskeletal  o General  o :   § General Musculoskeletal  § : Muscle tone and strength were normal.  · Skin and Subcutaneous Tissue  o General Inspection  o : Unremarkable.  · EKG  o EKG  o : Prior EKG showed normal sinus rhythm " with a left bundle branch block abnormality.  · Labs  o Labs  o : Creatinine 1.92, potassium 4.0.  · Device Interrogation  o Device Interrogation  o : Interrogation of her dual-chamber pacemaker showed right atrial pacing 32% of the time, working normally. Right ventricular lead pacing 26% of the time, working normally. She had 2 episodes of atrial fibrillation, the longest duration was 33 minutes. No changes made to the device.          Assessment     ASSESSMENT & PLAN:    1.  Congestive heart failure, systolic, unknown prior etiology.  Given patient's advanced age, would just work        on symptomatic treatment with medication titration and recommend the addition of lisinopril 2.5 mg.         Patient is on hemodialysis for volume management.  In addition, do not feel that she would be a good        candidate for ICD upgrade given her age, as well as her underlying end-stage renal disease issues.  2.  Paroxysmal atrial fibrillation, 2 episodes seen briefly on pacemaker of just 33 minutes in duration.  Given        patient's bleeding risk and short duration, I feel aspirin is a reasonabe treatment strategy.  3.  End-stage renal disease, on hemodialysis.  4.  Bradycardia with dual-chamber pacemaker working properly.                   Electronically Signed by: Aleja Kurtz-, Other -Author on July 27, 2020 06:35:49 AM  Electronically Co-signed by: Scout Shultz MD -Reviewer on July 27, 2020 10:30:48 AM

## 2021-05-13 NOTE — PROGRESS NOTES
Progress Note      Patient Name: Carrington Ledbetter   Patient ID: [id number]   Sex: Female   YOB: 1925    Primary Care Provider: DAYDAY MARY DO   Referring Provider: Cosmo Garcia DPM    Visit Date: September 28, 2020    Provider: Cosmo Garcia DPM   Location: [facility]   Location Address: [address] [place] [address]  [id number]   Location Phone: [phone]          Chief Complaint  · Routine Foot Care Visit      History Of Present Illness  Carrington Ledbetter complains of painful, elongated toenails which are thickened, yellowed, chalky, and cause pain with shoe gear and ambulation.      New, Established, New Problem:  Established   Location:  Toenails  Duration:   Greater than five years  Onset:  Gradual  Nature:  Sore with palpation.  Stable, worsening, improving:   stable  Aggravating factors:  Pain with shoe gear and ambulation.  Previous Treatment:  Debridement    Patient denies any fevers, chills, nausea, vomiting, shortness of breath, nor any other constitutional signs nor symptoms.    Patient reports the following medical changes since their last visit:    - using nasal oxygen           Past Medical History  Ankle pain; Arthritis; Dialysis patient; Difficulty walking; Foot pain, left; Foot pain, right; Hammer toe; High blood pressure; Ingrowing nail; Kidney failure; Oxygen desaturation during sleep; Tinea unguium; Type 2 diabetes mellitus         Past Surgical History  Appendectomy; Cholecstectomy; Hysterectomy; Pacemaker         Medication List  allopurinol 300 mg oral tablet; Anoro Ellipta 62.5-25 mcg/actuation inhalation blister with device; Arnuity Ellipta 100 mcg/actuation inhalation blister with device; aspirin 81 mg oral tablet,delayed release (DR/EC); carvedilol 12.5 mg oral tablet; docusate sodium 100 mg oral capsule; furosemide 40 mg oral tablet; Gas-X 62.5 mg oral strip; levalbuterol HCl 1.25 mg/3 mL inhalation solution for nebulization; lisinopril 2.5  "mg oral tablet; lovastatin 40 mg oral tablet; montelukast 10 mg oral tablet; prednisone 10 mg oral tablet         Allergy List  No known history of drug allergy       Allergies Reconciled  Family Medical History  Stroke; Heart Disease; Diabetes Mellitus, Type II         Social History  Alcohol (Light); Tobacco (Former)         Review of Systems  · Constitutional  o Denies  o : fever, chills  · Eyes  o Denies  o : double vision  · HENT  o Denies  o : vertigo, recent head injury, hearing loss or ringing  · Cardiovascular  o Denies  o : chest pain, irregular heart beats  · Respiratory  o Denies  o : shortness of breath, productive cough  · Gastrointestinal  o Denies  o : nausea, vomiting  · Integument  o * See HPI  · Neurologic  o Denies  o : altered mental status, tingling or numbness, seizures  · Musculoskeletal  o Denies  o : joint pain, joint swelling, limitation of motion      Vitals  Date Time BP Position Site L\R Cuff Size HR RR TEMP (F) WT  HT  BMI kg/m2 BSA m2 O2 Sat        09/28/2020 09:48 /71 Sitting    90 - R  97.9 142lbs 8oz 4'  11\" 28.78 1.64 85 %          Physical Examination  · Constitutional  o Appearance  o : well-nourished, well developed, no obvious deformities present, appears to be chronically ill. Patient uses a cane for assistance with ambulation.  · Eyes  o Vision  o : No glasses.   · Respiratory  o Respiratory Effort  o : No labored breathing. Good respiratory effort.   · Cardiovascular  o Peripheral Vascular System  o :   § Pedal Pulses  § : Pedal Pulses are 2+ and symmetrical  § Extremities  § : There is no edema of the lower extremities  · Musculoskeletal  o Extremeties/Joint  o : Lower extremity muscle strength and range of motion is equal and symmetrical bilaterally. The knees are noted to be in normal alignment. Ankle alignment and range of motion is normal and foot structure is normal.  · Skin and Subcutaneous Tissue  o General Inspection  o : no lesions present, no areas of " discoloration, skin turgor normal, texture normal  · Neurologic  o Sensation  o : Sharp/dull sensation is within normal limits. [place]-Kade 5.07 monofilament intact to all assessed areas.   · Toes  o Toes: Right Foot  o :   § Toenails  § : Toenails are hypertrophic, mycotc, dystrophic, thickened, with sublingual debris, incurvated, brittle toenail(s) at nail-1, 2, 3, 5, Oncholysis of the foot.  o Toes: Left Foot  o :   § Toenails  § : Toenails are hypertrophic, mycotc, dystrophic, thickened, with sublingual debris, incurvated, brittle toenail(s) at nail-1, 2, 3, 5, Onycholysis of the foot.  · Procedures  o Nail Debridement  o : Nail debridement is indicated for the following toenails:-left hallux, left 2nd toe, left 3rd toe, left 5th toe, right hallux, right 2nd toe, right 3rd toe, right 5th toe, The nail was debrided of excessive thickness to appropriate levels of comfort and contour using nail nippers. The procedure was without complications.          Assessment  · Foot pain, left     729.5/M79.672  · Foot pain, right     729.5/M79.671  · Ingrowing nail     703.0/L60.0  · Tinea unguium     110.1/B35.1  · Difficulty walking     719.7/R26.2      Plan  · Orders  o Debridement of six or more nails (07192, 42469, 16886, 85746) - 729.5/M79.672, 729.5/M79.671, 110.1/B35.1, 719.7/R26.2 - 09/28/2020  o ACO-16: BMI above normal range and follow-up plan is documented (, , , , , , , , , , , ) - - 09/28/2020  · Medications  o Medications have been Reconciled  o Transition of Care or Provider Policy  · Instructions  o I have discussed the findings of this evaluation with the patient. The discussion included a complete verbal explanation of any changes in the examination results, diagnosis, and the current treatment plan. A schedule for future care needs was explained. If any questions should arise after returning home, I have encouraged the patient to feel free to  contact Dr. Garcia. The patient states understanding and agreement with this plan.   o Patient is to monitor for problems and to contact Dr. Garcia for follow-up should such signs occur. Patient states understanding and agreement with this plan.   o BMI Counseling: Discussed weight loss and the need for exercise.   o Encouraged to follow-up with Primary Care Provider for preventative care.   o Follow up in 9 weeks for Routine Foot Care.   o Electronically Identified Patient Education Materials Provided Electronically  · Disposition  o Call or Return if symptoms worsen or persist.            Electronically Signed by: Cosmo Garcia DPM -Author on September 28, 2020 10:11:32 AM

## 2021-05-13 NOTE — PROGRESS NOTES
Progress Note      Patient Name: Carrington Ledbetter   Patient ID: [id number]   Sex: Female   YOB: 1925    Primary Care Provider: DAYDAY MARY DO   Referring Provider: Cosmo Garcia DPM    Visit Date: August 24, 2020    Provider: Cosmo Garcia DPM   Location: [facility]   Location Address: [address] [place] [address]  [id number]   Location Phone: [phone]          Chief Complaint  · Routine Foot Care Visit      History Of Present Illness  Carrington Ledbetter complains of painful, elongated toenails which are thickened, yellowed, chalky, and cause pain with shoe gear and ambulation.      New, Established, New Problem:  Established   Location:  Toenails  Duration:   Greater than five years  Onset:  Gradual  Nature:  Sore with palpation.  Stable, worsening, improving:   stable  Aggravating factors:  Pain with shoe gear and ambulation.  Previous Treatment:  Debridement    Patient denies any fevers, chills, nausea, vomiting, shortness of breath, nor any other constitutional signs nor symptoms.    Patient reports the following medical changes since their last visit:    - her kidneys improved to the point that she no longer needs dialysis           Past Medical History  Ankle pain; Arthritis; Dialysis patient; Difficulty walking; Foot pain, left; Foot pain, right; Hammer toe; High blood pressure; Ingrowing nail; Kidney failure; Oxygen desaturation during sleep; Tinea unguium; Type 2 diabetes mellitus         Past Surgical History  Appendectomy; Cholecstectomy; Hysterectomy; Pacemaker         Medication List  allopurinol 300 mg oral tablet; Anoro Ellipta 62.5-25 mcg/actuation inhalation blister with device; Arnuity Ellipta 100 mcg/actuation inhalation blister with device; aspirin 81 mg oral tablet,delayed release (DR/EC); calcium acetate 667 mg oral tablet; carvedilol 12.5 mg oral tablet; docusate sodium 100 mg oral capsule; furosemide 40 mg oral tablet; Gas-X  "62.5 mg oral strip; hydralazine 25 mg oral tablet; isosorbide mononitrate 30 mg oral tablet extended release 24 hr; levalbuterol HCl 1.25 mg/3 mL inhalation solution for nebulization; lovastatin 40 mg oral tablet; montelukast 10 mg oral tablet; prednisone 10 mg oral tablet         Allergy List  No known history of drug allergy       Allergies Reconciled  Family Medical History  Stroke; Heart Disease; Diabetes Mellitus, Type II         Social History  Alcohol (Light); Tobacco (Former)         Review of Systems  · Constitutional  o Denies  o : fever, chills  · Eyes  o Denies  o : double vision  · HENT  o Denies  o : vertigo, recent head injury, hearing loss or ringing  · Cardiovascular  o Denies  o : chest pain, irregular heart beats  · Respiratory  o Denies  o : shortness of breath, productive cough  · Gastrointestinal  o Denies  o : nausea, vomiting  · Integument  o * See HPI  · Neurologic  o Denies  o : altered mental status, tingling or numbness, seizures  · Musculoskeletal  o Denies  o : joint pain, joint swelling, limitation of motion      Vitals  Date Time BP Position Site L\R Cuff Size HR RR TEMP (F) WT  HT  BMI kg/m2 BSA m2 O2 Sat        08/24/2020 09:49 /78 Sitting    99 - R  97.1 145lbs 0oz 4'  11\" 29.29 1.65           Physical Examination  · Constitutional  o Appearance  o : well-nourished, well developed, no obvious deformities present, appears to be chronically ill. Patient uses a cane for assistance with ambulation.  · Eyes  o Vision  o : No glasses.   · Respiratory  o Respiratory Effort  o : No labored breathing. Good respiratory effort.   · Cardiovascular  o Peripheral Vascular System  o :   § Pedal Pulses  § : Pedal Pulses are 2+ and symmetrical  § Extremities  § : There is no edema of the lower extremities  · Musculoskeletal  o Extremeties/Joint  o : Lower extremity muscle strength and range of motion is equal and symmetrical bilaterally. The knees are noted to be in normal alignment. Ankle " alignment and range of motion is normal and foot structure is normal.  · Skin and Subcutaneous Tissue  o General Inspection  o : no lesions present, no areas of discoloration, skin turgor normal, texture normal  · Neurologic  o Sensation  o : Sharp/dull sensation is within normal limits. [place]-Kade 5.07 monofilament intact to all assessed areas.   · Toes  o Toes: Right Foot  o :   § Toenails  § : Toenails are hypertrophic, mycotc, dystrophic, thickened, with sublingual debris, incurvated, brittle toenail(s) at nail-1, 2, 3, 5, Oncholysis of the foot.  o Toes: Left Foot  o :   § Toenails  § : Toenails are hypertrophic, mycotc, dystrophic, thickened, with sublingual debris, incurvated, brittle toenail(s) at nail-1, 2, 3, 5, Onycholysis of the foot.  · Procedures  o Nail Debridement  o : Nail debridement is indicated for the following toenails:-left hallux, left 2nd toe, left 3rd toe, left 5th toe, right hallux, right 2nd toe, right 3rd toe, right 5th toe, The nail was debrided of excessive thickness to appropriate levels of comfort and contour using nail nippers. The procedure was without complications.          Assessment  · Foot pain, left     729.5/M79.672  · Foot pain, right     729.5/M79.671  · Ingrowing nail     703.0/L60.0  · Tinea unguium     110.1/B35.1  · Difficulty walking     719.7/R26.2      Plan  · Orders  o Debridement of six or more nails (05830, 92408, 41705, 82687) - 729.5/M79.672, 729.5/M79.671, 110.1/B35.1, 719.7/R26.2 - 08/24/2020  o ACO-16: BMI above normal range and follow-up plan is documented (, , , , , , , , , , ) - - 08/24/2020  · Medications  o Medications have been Reconciled  o Transition of Care or Provider Policy  · Instructions  o I have discussed the findings of this evaluation with the patient. The discussion included a complete verbal explanation of any changes in the examination results, diagnosis, and the current  treatment plan. A schedule for future care needs was explained. If any questions should arise after returning home, I have encouraged the patient to feel free to contact Dr. Garcia. The patient states understanding and agreement with this plan.   o Patient is to monitor for problems and to contact Dr. Garcia for follow-up should such signs occur. Patient states understanding and agreement with this plan.   o BMI Counseling: Discussed weight loss and the need for exercise.   o Encouraged to follow-up with Primary Care Provider for preventative care.   o Follow up in 9 weeks for Routine Foot Care.   o Electronically Identified Patient Education Materials Provided Electronically  · Disposition  o Call or Return if symptoms worsen or persist.            Electronically Signed by: Cosmo Garcia DPM -Author on August 24, 2020 09:54:38 AM

## 2021-05-13 NOTE — PROGRESS NOTES
Progress Note      Patient Name: Carrington Ledbetter   Patient ID: [id number]   Sex: Female   YOB: 1925    Primary Care Provider: DAYDAY MARY DO   Referring Provider: Cosmo Garcia DPM    Visit Date: July 20, 2020    Provider: Cosmo Garcia DPM   Location: [facility]   Location Address: [address] [place] [address]  [id number]   Location Phone: [phone]          Chief Complaint  · Routine Foot Care Visit      History Of Present Illness  Carrington Ledbetter complains of painful, elongated toenails which are thickened, yellowed, chalky, and cause pain with shoe gear and ambulation.      New, Established, New Problem:  Established   Location:  Toenails  Duration:   Greater than five years  Onset:  Gradual  Nature:  Sore with palpation.  Stable, worsening, improving:   Worsening  Aggravating factors:  Pain with shoe gear and ambulation.  Previous Treatment:  Debridement    Patient denies any fevers, chills, nausea, vomiting, shortness of breath, nor any other constitutional signs nor symptoms.    Patient relates no medical changes since their last visit.           Past Medical History  Ankle pain; Arthritis; Difficulty walking; Foot pain, left; Foot pain, right; Hammer toe; High blood pressure; Ingrowing nail; Oxygen desaturation during sleep; Tinea unguium; Type 2 diabetes mellitus         Past Surgical History  Appendectomy; Cholecstectomy; Hysterectomy; Pacemaker         Medication List  albuterol sulfate 2.5 mg/0.5 mL inhalation solution for nebulization; allopurinol 100 mg oral tablet; amlodipine 5 mg oral tablet; Anoro Ellipta 62.5-25 mcg/actuation inhalation blister with device; Arnuity Ellipta 100 mcg/actuation inhalation blister with device; aspirin 81 mg oral tablet,delayed release (DR/EC); furosemide 40 mg oral tablet; lovastatin 40 mg oral tablet; montelukast 10 mg oral tablet; omeprazole 40 mg oral capsule,delayed release(DR/EC)         Allergy  List  No known history of drug allergy       Allergies Reconciled  Family Medical History  Stroke; Heart Disease; Diabetes Mellitus, Type II         Social History  Alcohol (Light); Tobacco (Former)         Review of Systems  · Constitutional  o Denies  o : fever, chills  · Eyes  o Denies  o : double vision  · HENT  o Denies  o : vertigo, recent head injury, hearing loss or ringing  · Cardiovascular  o Denies  o : chest pain, irregular heart beats  · Respiratory  o Denies  o : shortness of breath, productive cough  · Gastrointestinal  o Denies  o : nausea, vomiting  · Integument  o * See HPI  · Neurologic  o Denies  o : altered mental status, tingling or numbness, seizures  · Musculoskeletal  o Denies  o : joint pain, joint swelling, limitation of motion      Physical Examination  · Constitutional  o Appearance  o : well-nourished, well developed, no obvious deformities present, appears to be chronically ill. Patient uses a cane for assistance with ambulation.  · Eyes  o Vision  o : No glasses.   · Respiratory  o Respiratory Effort  o : No labored breathing. Good respiratory effort.   · Cardiovascular  o Peripheral Vascular System  o :   § Pedal Pulses  § : Pedal Pulses are 2+ and symmetrical  § Extremities  § : There is no edema of the lower extremities  · Musculoskeletal  o Extremeties/Joint  o : Lower extremity muscle strength and range of motion is equal and symmetrical bilaterally. The knees are noted to be in normal alignment. Ankle alignment and range of motion is normal and foot structure is normal.  · Skin and Subcutaneous Tissue  o General Inspection  o : no lesions present, no areas of discoloration, skin turgor normal, texture normal  · Neurologic  o Sensation  o : Sharp/dull sensation is within normal limits. [place]-Kade 5.07 monofilament intact to all assessed areas.   · Toes  o Toes: Right Foot  o :   § Toenails  § : Toenails are hypertrophic, mycotc, dystrophic, thickened, with sublingual  debris, incurvated, brittle toenail(s) at nail-1, 2, 3, 5, Oncholysis of the foot.  o Toes: Left Foot  o :   § Toenails  § : Toenails are hypertrophic, mycotc, dystrophic, thickened, with sublingual debris, incurvated, brittle toenail(s) at nail-1, 2, 3, 5, Onycholysis of the foot.  · Procedures  o Nail Debridement  o : Nail debridement is indicated for the following toenails:-left hallux, left 2nd toe, left 3rd toe, left 5th toe, right hallux, right 2nd toe, right 3rd toe, right 5th toe, The nail was debrided of excessive thickness to appropriate levels of comfort and contour using nail nippers. The procedure was without complications.          Assessment  · Foot pain, left     729.5/M79.672  · Foot pain, right     729.5/M79.671  · Ingrowing nail     703.0/L60.0  · Tinea unguium     110.1/B35.1  · Difficulty walking     719.7/R26.2      Plan  · Orders  o Debridement of six or more nails (33011, 07492, 73270, 03000) - 729.5/M79.672, 729.5/M79.671, 110.1/B35.1, 719.7/R26.2 - 07/20/2020  o ACO-16: BMI above normal range and follow-up plan is documented (, , , , , , , , , ) - - 07/20/2020  · Medications  o Medications have been Reconciled  o Transition of Care or Provider Policy  · Instructions  o I have discussed the findings of this evaluation with the patient. The discussion included a complete verbal explanation of any changes in the examination results, diagnosis, and the current treatment plan. A schedule for future care needs was explained. If any questions should arise after returning home, I have encouraged the patient to feel free to contact Dr. Garcia. The patient states understanding and agreement with this plan.   o Patient is to monitor for problems and to contact Dr. Garcia for follow-up should such signs occur. Patient states understanding and agreement with this plan.   o BMI Counseling: Discussed weight loss and the need for exercise.    o Encouraged to follow-up with Primary Care Provider for preventative care.   o Follow up in 9 weeks for Routine Foot Care.   o Electronically Identified Patient Education Materials Provided Electronically  · Disposition  o Call or Return if symptoms worsen or persist.            Electronically Signed by: Cosmo Garcia DPM -Author on July 20, 2020 09:45:36 AM

## 2021-05-13 NOTE — PROGRESS NOTES
Progress Note      Patient Name: Carrington Ledbetter   Patient ID: [id number]   Sex: Female   YOB: 1925    Primary Care Provider: DAYDAY MARY DO   Referring Provider: Cosmo Garcia DPM    Visit Date: November 2, 2020    Provider: Cosmo Garcia DPM   Location: [facility]   Location Address: [address] [place] [address]  [id number]   Location Phone: [phone]          Chief Complaint  · Routine Foot Care Visit      History Of Present Illness  Carrington Ledbetter complains of painful, elongated toenails which are thickened, yellowed, chalky, and cause pain with shoe gear and ambulation.      New, Established, New Problem:  Established   Location:  Toenails  Duration:   Greater than five years  Onset:  Gradual  Nature:  Sore with palpation.  Stable, worsening, improving:   stable  Aggravating factors:  Pain with shoe gear and ambulation.  Previous Treatment:  Debridement    Patient denies any fevers, chills, nausea, vomiting, shortness of breath, nor any other constitutional signs nor symptoms.    Patient relates no medical changes since their last visit.           Past Medical History  Ankle pain; Arthritis; Dialysis patient; Difficulty walking; Foot pain, left; Foot pain, right; Hammer toe; High blood pressure; Ingrowing nail; Kidney failure; Oxygen desaturation during sleep; Tinea unguium; Type 2 diabetes mellitus         Past Surgical History  Appendectomy; Cholecstectomy; Hysterectomy; Pacemaker         Medication List  allopurinol 300 mg oral tablet; Anoro Ellipta 62.5-25 mcg/actuation inhalation blister with device; Arnuity Ellipta 100 mcg/actuation inhalation blister with device; aspirin 81 mg oral tablet,delayed release (DR/EC); carvedilol 12.5 mg oral tablet; docusate sodium 100 mg oral capsule; furosemide 40 mg oral tablet; Gas-X 62.5 mg oral strip; levalbuterol HCl 1.25 mg/3 mL inhalation solution for nebulization; lisinopril 2.5 mg oral tablet; lovastatin 40 mg  "oral tablet; montelukast 10 mg oral tablet; prednisone 10 mg oral tablet         Allergy List  No known history of drug allergy       Allergies Reconciled  Family Medical History  Stroke; Heart Disease; Diabetes Mellitus, Type II         Social History  Alcohol (Light); Tobacco (Former)         Review of Systems  · Constitutional  o Denies  o : fever, chills  · Eyes  o Denies  o : double vision  · HENT  o Denies  o : vertigo, recent head injury, hearing loss or ringing  · Cardiovascular  o Denies  o : chest pain, irregular heart beats  · Respiratory  o Denies  o : shortness of breath, productive cough  · Gastrointestinal  o Denies  o : nausea, vomiting  · Integument  o * See HPI  · Neurologic  o Denies  o : altered mental status, tingling or numbness, seizures  · Musculoskeletal  o Denies  o : joint pain, joint swelling, limitation of motion      Vitals  Date Time BP Position Site L\R Cuff Size HR RR TEMP (F) WT  HT  BMI kg/m2 BSA m2 O2 Sat FR L/min FiO2        11/02/2020 10:02 /70 Sitting    92 - R  96.9 142lbs 0oz 4'  11\" 28.68 1.64 90 %            Physical Examination  · Constitutional  o Appearance  o : well-nourished, well developed, no obvious deformities present, appears to be chronically ill. Patient uses a cane for assistance with ambulation. Pt using nasal oxygen.  · Eyes  o Vision  o : No glasses.   · Respiratory  o Respiratory Effort  o : No labored breathing. Good respiratory effort.   · Cardiovascular  o Peripheral Vascular System  o :   § Pedal Pulses  § : Pedal Pulses are 2+ and symmetrical  § Extremities  § : There is no edema of the lower extremities  · Musculoskeletal  o Extremeties/Joint  o : Lower extremity muscle strength and range of motion is equal and symmetrical bilaterally. The knees are noted to be in normal alignment. Ankle alignment and range of motion is normal and foot structure is normal.  · Skin and Subcutaneous Tissue  o General Inspection  o : no lesions present, no areas " of discoloration, skin turgor normal, texture normal  · Neurologic  o Sensation  o : Sharp/dull sensation is within normal limits. [place]-Kade 5.07 monofilament intact to all assessed areas.   · Toes  o Toes: Right Foot  o :   § Toenails  § : Toenails are hypertrophic, mycotc, dystrophic, thickened, with sublingual debris, incurvated, brittle toenail(s) at nail-1, 2, 3, 5, Oncholysis of the foot.  o Toes: Left Foot  o :   § Toenails  § : Toenails are hypertrophic, mycotc, dystrophic, thickened, with sublingual debris, incurvated, brittle toenail(s) at nail-1, 2, 3, 5, Onycholysis of the foot.  · Procedures  o Nail Debridement  o : Nail debridement is indicated for the following toenails:-left hallux, left 2nd toe, left 3rd toe, left 5th toe, right hallux, right 2nd toe, right 3rd toe, right 5th toe, The nail was debrided of excessive thickness to appropriate levels of comfort and contour using nail nippers. The procedure was without complications.          Assessment  · Foot pain, left     729.5/M79.672  · Foot pain, right     729.5/M79.671  · Ingrowing nail     703.0/L60.0  · Tinea unguium     110.1/B35.1  · Difficulty walking     719.7/R26.2      Plan  · Orders  o Debridement of six or more nails (06098, 23012, 95698, 92225) - 729.5/M79.672, 729.5/M79.671, 110.1/B35.1, 719.7/R26.2 - 11/02/2020  o ACO-16: BMI above normal range and follow-up plan is documented (, , , , , , , , , , , , ) - - 11/02/2020  · Medications  o Medications have been Reconciled  o Transition of Care or Provider Policy  · Instructions  o I have discussed the findings of this evaluation with the patient. The discussion included a complete verbal explanation of any changes in the examination results, diagnosis, and the current treatment plan. A schedule for future care needs was explained. If any questions should arise after returning home, I have encouraged the patient to  feel free to contact Dr. Garcia. The patient states understanding and agreement with this plan.   o Patient is to monitor for problems and to contact Dr. Garcia for follow-up should such signs occur. Patient states understanding and agreement with this plan.   o BMI Counseling: Discussed weight loss and the need for exercise.   o Encouraged to follow-up with Primary Care Provider for preventative care.   o Follow up in 9 weeks for Routine Foot Care.   o Electronically Identified Patient Education Materials Provided Electronically  · Disposition  o Call or Return if symptoms worsen or persist.            Electronically Signed by: Cosmo Garcia DPM -Author on November 2, 2020 10:09:11 AM

## 2021-05-13 NOTE — PROGRESS NOTES
Progress Note      Patient Name: Carrington Ledbetter   Patient ID: [id number]   Sex: Female   YOB: 1925    Primary Care Provider: DAYDAY MARY DO   Referring Provider: Cosmo Garcia DPM    Visit Date: December 14, 2020    Provider: Cosmo Garcia DPM   Location: [facility]   Location Address: [address] [place] [address]  [id number]   Location Phone: [phone]          Chief Complaint  · Routine Foot Care Visit      History Of Present Illness  Carrington Ledbetter complains of painful, elongated toenails which are thickened, yellowed, chalky, and cause pain with shoe gear and ambulation.      New, Established, New Problem:  Established   Location:  Toenails  Duration:   Greater than five years  Onset:  Gradual  Nature:  Sore with palpation.  Stable, worsening, improving:   stable  Aggravating factors:  Pain with shoe gear and ambulation.  Previous Treatment:  Debridement    Patient denies any fevers, chills, nausea, vomiting, shortness of breath, nor any other constitutional signs nor symptoms.    Patient reports the following medical changes since their last visit:    - seeing [facility]           Past Medical History  Ankle pain; Arthritis; Dialysis patient; Difficulty walking; Foot pain, left; Foot pain, right; Hammer toe; High blood pressure; Ingrowing nail; Kidney failure; Oxygen desaturation during sleep; Tinea unguium; Type 2 diabetes mellitus         Past Surgical History  Appendectomy; Cholecstectomy; Hysterectomy; Pacemaker         Medication List  allopurinol 300 mg oral tablet; Anoro Ellipta 62.5-25 mcg/actuation inhalation blister with device; Arnuity Ellipta 100 mcg/actuation inhalation blister with device; aspirin 81 mg oral tablet,delayed release (DR/EC); carvedilol 12.5 mg oral tablet; docusate sodium 100 mg oral capsule; furosemide 40 mg oral tablet; Gas-X 62.5 mg oral strip; levalbuterol HCl 1.25 mg/3 mL inhalation solution for nebulization; lisinopril  "2.5 mg oral tablet; lovastatin 40 mg oral tablet; montelukast 10 mg oral tablet; prednisone 10 mg oral tablet         Allergy List  No known history of drug allergy       Allergies Reconciled  Family Medical History  Stroke; Heart Disease; Diabetes Mellitus, Type II         Social History  Alcohol (Light); Tobacco (Former)         Review of Systems  · Constitutional  o Denies  o : fever, chills  · Eyes  o Denies  o : double vision  · HENT  o Denies  o : vertigo, recent head injury, hearing loss or ringing  · Cardiovascular  o Denies  o : chest pain, irregular heart beats  · Respiratory  o Denies  o : shortness of breath, productive cough  · Gastrointestinal  o Denies  o : nausea, vomiting  · Integument  o * See HPI  · Neurologic  o Denies  o : altered mental status, tingling or numbness, seizures  · Musculoskeletal  o Denies  o : joint pain, joint swelling, limitation of motion      Vitals  Date Time BP Position Site L\R Cuff Size HR RR TEMP (F) WT  HT  BMI kg/m2 BSA m2 O2 Sat FR L/min FiO2        12/14/2020 10:44 /62 Sitting    81 - R  96.9 133lbs 0oz 4'  11\" 26.86 1.58 97 %            Physical Examination  · Constitutional  o Appearance  o : well-nourished, well developed, no obvious deformities present, appears to be chronically ill. Patient uses a cane for assistance with ambulation. Pt using nasal oxygen.  · Eyes  o Vision  o : No glasses.   · Respiratory  o Respiratory Effort  o : No labored breathing. Good respiratory effort.   · Cardiovascular  o Peripheral Vascular System  o :   § Pedal Pulses  § : Pedal Pulses are 2+ and symmetrical  § Extremities  § : There is no edema of the lower extremities  · Musculoskeletal  o Extremeties/Joint  o : Lower extremity muscle strength and range of motion is equal and symmetrical bilaterally. The knees are noted to be in normal alignment. Ankle alignment and range of motion is normal and foot structure is normal.  · Skin and Subcutaneous Tissue  o General " Inspection  o : no lesions present, no areas of discoloration, skin turgor normal, texture normal  · Neurologic  o Sensation  o : Sharp/dull sensation is within normal limits. [place]-Kade 5.07 monofilament intact to all assessed areas.   · Toes  o Toes: Right Foot  o :   § Toenails  § : Toenails are hypertrophic, mycotc, dystrophic, thickened, with sublingual debris, incurvated, brittle toenail(s) at nail-1, 2, 3, 5, Oncholysis of the foot.  o Toes: Left Foot  o :   § Toenails  § : Toenails are hypertrophic, mycotc, dystrophic, thickened, with sublingual debris, incurvated, brittle toenail(s) at nail-1, 2, 3, 5, Onycholysis of the foot.  · Procedures  o Nail Debridement  o : Nail debridement is indicated for the following toenails:-left hallux, left 2nd toe, left 3rd toe, left 5th toe, right hallux, right 2nd toe, right 3rd toe, right 5th toe, The nail was debrided of excessive thickness to appropriate levels of comfort and contour using nail nippers. The procedure was without complications.          Assessment  · Foot pain, left     729.5/M79.672  · Foot pain, right     729.5/M79.671  · Ingrowing nail     703.0/L60.0  · Tinea unguium     110.1/B35.1  · Difficulty walking     719.7/R26.2      Plan  · Orders  o Debridement of six or more nails (71119, 38663, 06715, 10909) - 729.5/M79.672, 729.5/M79.671, 110.1/B35.1, 719.7/R26.2 - 12/14/2020  o ACO-16: BMI above normal range and follow-up plan is documented (, , , , , , , , , , , , , ) - - 12/14/2020  · Medications  o Medications have been Reconciled  o Transition of Care or Provider Policy  · Instructions  o I have discussed the findings of this evaluation with the patient. The discussion included a complete verbal explanation of any changes in the examination results, diagnosis, and the current treatment plan. A schedule for future care needs was explained. If any questions should arise after  returning home, I have encouraged the patient to feel free to contact Dr. Garcia. The patient states understanding and agreement with this plan.   o Patient is to monitor for problems and to contact Dr. Garcia for follow-up should such signs occur. Patient states understanding and agreement with this plan.   o BMI Counseling: Discussed weight loss and the need for exercise.   o Encouraged to follow-up with Primary Care Provider for preventative care.   o Follow up in 9 weeks for Routine Foot Care.   o Electronically Identified Patient Education Materials Provided Electronically  · Disposition  o Call or Return if symptoms worsen or persist.            Electronically Signed by: Cosmo Garcia DPM -Author on December 14, 2020 10:50:57 AM

## 2021-05-14 NOTE — PROGRESS NOTES
Progress Note      Patient Name: Carrington Ledbetter   Patient ID: [id number]   Sex: Female   YOB: 1925    Primary Care Provider: DAYDAY MARY DO   Referring Provider: Cosmo Garcia DPM    Visit Date: February 23, 2021    Provider: Cosmo Garcia DPM   Location: [facility]   Location Address: [address] [place] [address]  [id number]   Location Phone: [phone]          Chief Complaint  · Routine Foot Care Visit      History Of Present Illness  Carrington Ledbetter complains of painful, elongated toenails which are thickened, yellowed, chalky, and cause pain with shoe gear and ambulation.      New, Established, New Problem:  Established   Location:  Toenails  Duration:   Greater than five years  Onset:  Gradual  Nature:  Sore with palpation.  Stable, worsening, improving:   stable  Aggravating factors:  Pain with shoe gear and ambulation.  Previous Treatment:  Debridement    Patient denies any fevers, chills, nausea, vomiting, shortness of breath, nor any other constitutional signs nor symptoms.    Patient reports the following medical changes since their last visit:    - recently seen by Dr. Shultz, Cardiology           Past Medical History  Ankle pain; Arthritis; Dialysis patient; Difficulty walking; Foot pain, left; Foot pain, right; Hammer toe; High blood pressure; Ingrowing nail; Kidney failure; Oxygen desaturation during sleep; Tinea unguium; Type 2 diabetes mellitus         Past Surgical History  Appendectomy; Cholecstectomy; Hysterectomy; Pacemaker         Medication List  allopurinol 300 mg oral tablet; Anoro Ellipta 62.5-25 mcg/actuation inhalation blister with device; Arnuity Ellipta 100 mcg/actuation inhalation blister with device; aspirin 81 mg oral tablet,delayed release (DR/EC); carvedilol 12.5 mg oral tablet; docusate sodium 100 mg oral capsule; furosemide 40 mg oral tablet; Gas-X 62.5 mg oral strip; levalbuterol HCl 1.25 mg/3 mL inhalation solution for  nebulization; losartan 25 mg oral tablet; lovastatin 40 mg oral tablet; montelukast 10 mg oral tablet; prednisone 10 mg oral tablet         Allergy List  No known history of drug allergy       Allergies Reconciled  Family Medical History  Stroke; Heart Disease; Diabetes Mellitus, Type II         Social History  Alcohol (Light); Tobacco (Former)         Review of Systems  · Constitutional  o Denies  o : fever, chills  · Eyes  o Denies  o : double vision  · HENT  o Denies  o : vertigo, recent head injury, hearing loss or ringing  · Cardiovascular  o Denies  o : chest pain, irregular heart beats  · Respiratory  o Denies  o : shortness of breath, productive cough  · Gastrointestinal  o Denies  o : nausea, vomiting  · Integument  o * See HPI  · Neurologic  o Denies  o : altered mental status, tingling or numbness, seizures  · Musculoskeletal  o Denies  o : joint pain, joint swelling, limitation of motion      Physical Examination  · Constitutional  o Appearance  o : well-nourished, well developed, no obvious deformities present, appears to be chronically ill. Patient uses a cane for assistance with ambulation. Pt using nasal oxygen.  · Eyes  o Vision  o : No glasses.   · Respiratory  o Respiratory Effort  o : No labored breathing. Good respiratory effort.   · Cardiovascular  o Peripheral Vascular System  o :   § Pedal Pulses  § : Pedal Pulses are 2+ and symmetrical  § Extremities  § : There is no edema of the lower extremities  · Musculoskeletal  o Extremeties/Joint  o : Lower extremity muscle strength and range of motion is equal and symmetrical bilaterally. The knees are noted to be in normal alignment. Ankle alignment and range of motion is normal and foot structure is normal.  · Skin and Subcutaneous Tissue  o General Inspection  o : no lesions present, no areas of discoloration, skin turgor normal, texture normal  · Neurologic  o Sensation  o : Sharp/dull sensation is within normal limits. [place]-Kade 5.07  monofilament intact to all assessed areas.   · Toes  o Toes: Right Foot  o :   § Toenails  § : Toenails are hypertrophic, mycotc, dystrophic, thickened, with sublingual debris, incurvated, brittle toenail(s) at nail-1, 2, 3, 5, Oncholysis of the foot.  o Toes: Left Foot  o :   § Toenails  § : Toenails are hypertrophic, mycotc, dystrophic, thickened, with sublingual debris, incurvated, brittle toenail(s) at nail-1, 2, 3, 5, Onycholysis of the foot.  · Procedures  o Nail Debridement  o : Nail debridement is indicated for the following toenails:-left hallux, left 2nd toe, left 3rd toe, left 5th toe, right hallux, right 2nd toe, right 3rd toe, right 5th toe, The nail was debrided of excessive thickness to appropriate levels of comfort and contour using nail nippers. The procedure was without complications.          Assessment  · Foot pain, left     729.5/M79.672  · Foot pain, right     729.5/M79.671  · Ingrowing nail     703.0/L60.0  · Tinea unguium     110.1/B35.1  · Difficulty walking     719.7/R26.2      Plan  · Orders  o Debridement of six or more nails (84014, 68930, 43027, 12510) - 729.5/M79.672, 729.5/M79.671, 110.1/B35.1, 719.7/R26.2 - 02/23/2021  o ACO-16: BMI above normal range and follow-up plan is documented (, , , , , , , , , , , , , , ) - - 02/23/2021  · Medications  o Medications have been Reconciled  o Transition of Care or Provider Policy  · Instructions  o I have discussed the findings of this evaluation with the patient. The discussion included a complete verbal explanation of any changes in the examination results, diagnosis, and the current treatment plan. A schedule for future care needs was explained. If any questions should arise after returning home, I have encouraged the patient to feel free to contact Dr. Garcia. The patient states understanding and agreement with this plan.   o Patient is to monitor for problems and  to contact Dr. aGrcia for follow-up should such signs occur. Patient states understanding and agreement with this plan.   o BMI Counseling: Discussed weight loss and the need for exercise.   o Encouraged to follow-up with Primary Care Provider for preventative care.   o Follow up in 9 weeks for Routine Foot Care.   o Electronically Identified Patient Education Materials Provided Electronically  · Disposition  o Call or Return if symptoms worsen or persist.            Electronically Signed by: Cosmo Garcia DPM -Author on February 23, 2021 09:47:45 AM

## 2021-05-14 NOTE — PROGRESS NOTES
Progress Note      Patient Name: Carrington Ledbetter   Patient ID: [id number]   Sex: Female   YOB: 1925    Primary Care Provider: DAYDAY MARY DO   Referring Provider: Cosmo Garcia DPAYDIN    Visit Date: February 11, 2021    Provider: Scout Shultz MD   Location: [facility]   Location Address: [address] [place] [address], [facility]   [place]  [id number]   Location Phone: [phone]          Chief Complaint     Congestive heart failure.       History Of Present Illness  TELEHEALTH TELEPHONE VISIT  Carrington Ledbetter is a 89 year old female who has congestive heart failure, systolic in nature, and end-stage renal disease, currently on hemodialysis. She has not been having any new issues or complaints of shortness of breath. She has had about a 20-poubd weight loss since last visit. She reports a feeling of hot flashes and runny eyes with her lisinopril use. Evaluation via telehealth telephone visit. Verbal consent obtained before beginning visit.   Provider spent 10 minutes with the patient during the telehealth visit.   The following staff were present during this visit: Provider only.      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     CURRENT MEDICATIONS: Lisinopril 2.5 mg daily; carvedilol 12.5 mg 1/2 b.i.d.; aspirin 81 mg daily; furosemide 40 mg daily; Colace p.r.n.     ALLERGIES: No known drug allergies.              Vitals     Patient unable to obtain vitals.       Physical Examination  · Labs  o Labs  o : Previous creatinine was 1.7. Potassium was 5.2.           Assessment     ASSESSMENT & PLAN:    1.  Systolic congestive heart failure, stable symptoms.  Patient not able to tolerate lisinopril.  Will change to        losartan 25 mg daily.  Continue with her other medications.  2.   End-stage renal disease, on hemodialysis.  3.  Hypertension, not able to monitor at home.  Previous blood pressure had been within range.      Scout Shultz MD  JH:vm             Electronically Signed by: Aleja Kurtz-, Other -Author on February 15, 2021 06:51:41 PM  Electronically Co-signed by: Scout Shultz MD -Reviewer on February 16, 2021 02:02:23 PM

## 2021-05-14 NOTE — PROGRESS NOTES
Progress Note      Patient Name: Carrington Ledbetter   Patient ID: [id number]   Sex: Female   YOB: 1925    Primary Care Provider: DAYDAY MARY DO   Referring Provider: Cosmo Garcia DPM    Visit Date: April 9, 2021    Provider: Cosmo Garcia DPM   Location: [facility]   Location Address: [address] [place] [address]  [id number]   Location Phone: [phone]          Chief Complaint  · Routine Foot Care Visit      History Of Present Illness  Carrington Ledbetter complains of painful, elongated toenails which are thickened, yellowed, chalky, and cause pain with shoe gear and ambulation.      New, Established, New Problem:  Established   Location:  Toenails  Duration:   Greater than five years  Onset:  Gradual  Nature:  Sore with palpation.  Stable, worsening, improving:   stable  Aggravating factors:  Pain with shoe gear and ambulation.  Previous Treatment:  Debridement    Patient denies any fevers, chills, nausea, vomiting, shortness of breath, nor any other constitutional signs nor symptoms.    Patient relates no medical changes since their last visit.           Past Medical History  Ankle pain; Arthritis; Dialysis patient; Difficulty walking; Foot pain, left; Foot pain, right; Hammer toe; High blood pressure; Ingrowing nail; Kidney failure; Oxygen dependent; Oxygen desaturation during sleep; Tinea unguium; Type 2 diabetes mellitus         Past Surgical History  Appendectomy; Cholecstectomy; Hysterectomy; Pacemaker         Medication List  allopurinol 300 mg oral tablet; Anoro Ellipta 62.5-25 mcg/actuation inhalation blister with device; Arnuity Ellipta 100 mcg/actuation inhalation blister with device; aspirin 81 mg oral tablet,delayed release (DR/EC); carvedilol 12.5 mg oral tablet; docusate sodium 100 mg oral capsule; furosemide 40 mg oral tablet; Gas-X 62.5 mg oral strip; levalbuterol HCl 1.25 mg/3 mL inhalation solution for nebulization; losartan 25 mg oral tablet;  "lovastatin 40 mg oral tablet; montelukast 10 mg oral tablet; prednisone 10 mg oral tablet         Allergy List  No known history of drug allergy       Allergies Reconciled  Family Medical History  Stroke; Heart Disease; Diabetes Mellitus, Type II         Social History  Alcohol (Light); Tobacco (Former)         Review of Systems  · Constitutional  o Denies  o : fever, chills  · Eyes  o Denies  o : double vision  · HENT  o Denies  o : vertigo, recent head injury, hearing loss or ringing  · Cardiovascular  o Denies  o : chest pain, irregular heart beats  · Respiratory  o Denies  o : shortness of breath, productive cough  · Gastrointestinal  o Denies  o : nausea, vomiting  · Integument  o * See HPI  · Neurologic  o Denies  o : altered mental status, tingling or numbness, seizures  · Musculoskeletal  o Denies  o : joint pain, joint swelling, limitation of motion      Vitals  Date Time BP Position Site L\R Cuff Size HR RR TEMP (F) WT  HT  BMI kg/m2 BSA m2 O2 Sat FR L/min FiO2        04/09/2021 10:42 /86 Sitting    82 - R  96.9 135lbs 0oz 4'  11\" 27.27 1.6 96 %            Physical Examination  · Constitutional  o Appearance  o : well-nourished, well developed, no obvious deformities present, appears to be chronically ill. Patient uses a cane for assistance with ambulation. Pt using nasal oxygen.  · Eyes  o Vision  o : No glasses.   · Respiratory  o Respiratory Effort  o : No labored breathing. Good respiratory effort.   · Cardiovascular  o Peripheral Vascular System  o :   § Pedal Pulses  § : Pedal Pulses are 2+ and symmetrical  § Extremities  § : There is no edema of the lower extremities  · Musculoskeletal  o Extremeties/Joint  o : Lower extremity muscle strength and range of motion is equal and symmetrical bilaterally. The knees are noted to be in normal alignment. Ankle alignment and range of motion is normal and foot structure is normal.  · Skin and Subcutaneous Tissue  o General Inspection  o : no lesions " present, no areas of discoloration, skin turgor normal, texture normal  · Neurologic  o Sensation  o : Sharp/dull sensation is within normal limits. [place]-Kade 5.07 monofilament intact to all assessed areas.   · Toes  o Toes: Right Foot  o :   § Toenails  § : Toenails are hypertrophic, mycotc, dystrophic, thickened, with sublingual debris, incurvated, brittle toenail(s) at nail-1, 2, 3, 5, Oncholysis of the foot.  o Toes: Left Foot  o :   § Toenails  § : Toenails are hypertrophic, mycotc, dystrophic, thickened, with sublingual debris, incurvated, brittle toenail(s) at nail-1, 2, 3, 5, Onycholysis of the foot.  · Procedures  o Nail Debridement  o : Nail debridement is indicated for the following toenails:-left hallux, left 2nd toe, left 3rd toe, left 5th toe, right hallux, right 2nd toe, right 3rd toe, right 5th toe, The nail was debrided of excessive thickness to appropriate levels of comfort and contour using nail nippers. The procedure was without complications.          Assessment  · Foot pain, left     729.5/M79.672  · Foot pain, right     729.5/M79.671  · Ingrowing nail     703.0/L60.0  · Tinea unguium     110.1/B35.1  · Difficulty walking     719.7/R26.2      Plan  · Orders  o Debridement of six or more nails (46153, 50705, 05059, 19364) - 729.5/M79.672, 729.5/M79.671, 110.1/B35.1, 719.7/R26.2 - 04/09/2021  · Medications  o Medications have been Reconciled  o Transition of Care or Provider Policy  · Instructions  o I have discussed the findings of this evaluation with the patient. The discussion included a complete verbal explanation of any changes in the examination results, diagnosis, and the current treatment plan. A schedule for future care needs was explained. If any questions should arise after returning home, I have encouraged the patient to feel free to contact Dr. Garcia. The patient states understanding and agreement with this plan.   o Patient is to monitor for problems and to contact   Radha for follow-up should such signs occur. Patient states understanding and agreement with this plan.   o Encouraged to follow-up with Primary Care Provider for preventative care.   o Follow up in 9 weeks for Routine Foot Care.   o Electronically Identified Patient Education Materials Provided Electronically  · Disposition  o Call or Return if symptoms worsen or persist.            Electronically Signed by: Cosmo Garcia DPM -Author on April 9, 2021 11:03:12 AM

## 2021-05-14 NOTE — PROGRESS NOTES
Progress Note      Patient Name: Carrington Ledbetter   Patient ID: [id number]   Sex: Female   YOB: 1925    Primary Care Provider: DAYDAY MARY DO   Referring Provider: Cosmo Garcia DPM    Visit Date: May 3, 2021    Provider: Cosmo Garcia DPM   Location: [facility]   Location Address: [address] [place] [address]  [id number]   Location Phone: [phone]          Chief Complaint  · Routine Foot Care Visit      History Of Present Illness  Carrington Ledbetter complains of painful, elongated toenails which are thickened, yellowed, chalky, and cause pain with shoe gear and ambulation.      New, Established, New Problem:  Established   Location:  Toenails  Duration:   Greater than five years  Onset:  Gradual  Nature:  Sore with palpation.  Stable, worsening, improving:   stable  Aggravating factors:  Pain with shoe gear and ambulation.  Previous Treatment:  Debridement    Patient denies any fevers, chills, nausea, vomiting, shortness of breath, nor any other constitutional signs nor symptoms.    Patient reports that no changes in their medications with their recent appointment with their primary care provider.           Past Medical History  Ankle pain; Arthritis; Dialysis patient; Difficulty walking; Foot pain, left; Foot pain, right; Hammer toe; High blood pressure; Ingrowing nail; Kidney failure; Oxygen dependent; Oxygen desaturation during sleep; Tinea unguium; Type 2 diabetes mellitus         Past Surgical History  Appendectomy; Cholecstectomy; Hysterectomy; Pacemaker         Medication List  allopurinol 300 mg oral tablet; Anoro Ellipta 62.5-25 mcg/actuation inhalation blister with device; Arnuity Ellipta 100 mcg/actuation inhalation blister with device; aspirin 81 mg oral tablet,delayed release (DR/EC); carvedilol 12.5 mg oral tablet; docusate sodium 100 mg oral capsule; furosemide 40 mg oral tablet; Gas-X 62.5 mg oral strip; levalbuterol HCl 1.25 mg/3 mL inhalation  "solution for nebulization; losartan 25 mg oral tablet; lovastatin 40 mg oral tablet; montelukast 10 mg oral tablet; prednisone 10 mg oral tablet         Allergy List  No known history of drug allergy       Allergies Reconciled  Family Medical History  Stroke; Heart Disease; Diabetes Mellitus, Type II         Social History  Alcohol (Light); Tobacco (Former)         Review of Systems  · Constitutional  o Denies  o : fever, chills  · Eyes  o Denies  o : double vision  · HENT  o Denies  o : vertigo, recent head injury, hearing loss or ringing  · Cardiovascular  o Denies  o : chest pain, irregular heart beats  · Respiratory  o Denies  o : shortness of breath, productive cough  · Gastrointestinal  o Denies  o : nausea, vomiting  · Integument  o * See HPI  · Neurologic  o Denies  o : altered mental status, tingling or numbness, seizures  · Musculoskeletal  o Denies  o : joint pain, joint swelling, limitation of motion      Vitals  Date Time BP Position Site L\R Cuff Size HR RR TEMP (F) WT  HT  BMI kg/m2 BSA m2 O2 Sat FR L/min FiO2        05/03/2021 11:15 /68 Sitting    87 - R   134lbs 0oz 4'  11\" 27.06 1.59 97 %            Physical Examination  · Constitutional  o Appearance  o : well-nourished, well developed, no obvious deformities present, appears to be chronically ill. Patient uses a cane for assistance with ambulation. Pt using nasal oxygen.  · Eyes  o Vision  o : No glasses.   · Respiratory  o Respiratory Effort  o : No labored breathing. Good respiratory effort.   · Cardiovascular  o Peripheral Vascular System  o :   § Pedal Pulses  § : Pedal Pulses are 2+ and symmetrical  § Extremities  § : There is no edema of the lower extremities  · Musculoskeletal  o Extremeties/Joint  o : Lower extremity muscle strength and range of motion is equal and symmetrical bilaterally. The knees are noted to be in normal alignment. Ankle alignment and range of motion is normal and foot structure is normal.  · Skin and " Subcutaneous Tissue  o General Inspection  o : no lesions present, no areas of discoloration, skin turgor normal, texture normal  · Neurologic  o Sensation  o : Sharp/dull sensation is within normal limits. [place]-Kade 5.07 monofilament intact to all assessed areas.   · Toes  o Toes: Right Foot  o :   § Toenails  § : Toenails are hypertrophic, mycotc, dystrophic, thickened, with sublingual debris, incurvated, brittle toenail(s) at nail-1, 2, 3, 5, Oncholysis of the foot.  o Toes: Left Foot  o :   § Toenails  § : Toenails are hypertrophic, mycotc, dystrophic, thickened, with sublingual debris, incurvated, brittle toenail(s) at nail-1, 2, 3, 5, Onycholysis of the foot.  · Procedures  o Nail Debridement  o : Nail debridement is indicated for the following toenails:-left hallux, left 2nd toe, left 3rd toe, left 5th toe, right hallux, right 2nd toe, right 3rd toe, right 5th toe, The nail was debrided of excessive thickness to appropriate levels of comfort and contour using nail nippers. The procedure was without complications.          Assessment  · Foot pain, left     729.5/M79.672  · Foot pain, right     729.5/M79.671  · Ingrowing nail     703.0/L60.0  · Tinea unguium     110.1/B35.1  · Difficulty walking     719.7/R26.2      Plan  · Orders  o Debridement of six or more nails (99236, 36285, 26888, 01850) - 729.5/M79.672, 729.5/M79.671, 110.1/B35.1, 719.7/R26.2 - 05/03/2021  · Medications  o Medications have been Reconciled  o Transition of Care or Provider Policy  · Instructions  o I have discussed the findings of this evaluation with the patient. The discussion included a complete verbal explanation of any changes in the examination results, diagnosis, and the current treatment plan. A schedule for future care needs was explained. If any questions should arise after returning home, I have encouraged the patient to feel free to contact Dr. Garcia. The patient states understanding and agreement with this plan.    o Patient is to monitor for problems and to contact Dr. Garcia for follow-up should such signs occur. Patient states understanding and agreement with this plan.   o Encouraged to follow-up with Primary Care Provider for preventative care.   o Follow up in 9 weeks for Routine Foot Care.   o Electronically Identified Patient Education Materials Provided Electronically  · Disposition  o Call or Return if symptoms worsen or persist.            Electronically Signed by: Cosmo Garcia DPM -Author on May 3, 2021 11:23:35 AM

## 2021-05-28 NOTE — PROGRESS NOTES
Patient: CARRINGTON LEDBETTER     Acct: [id number]     Report: #[id number]  UNIT #: [id number]     : 1925    Encounter Date:04/15/2020  PRIMARY CARE: DAYDAY MARY DO  ***Signed***  --------------------------------------------------------------------------------------------------------------------  TELEHEALTH NOTE      History of Present Illness      Chief Complaint: 6 month follow up/COPD            Carrington Ledbetter is presenting for evaluation via Telehealth visit. Verbal     consent obtained before beginning visit.            Provider spent 15 minutes with the patient during telehealth visit.            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 She has     bronchiectasis, chronic cough, difficulty with airway clearance and recurrent     bronchitis.  She required a Z-pack about 1 month ago for worsening congestion     and phlegm. This did seem to thin out her secretions and she did well. She is     using a flutter valve twice daily with levalbuterol and this seems to help keep     her airways clear. She has not been wearing her oxygen throughout the day     because of nose bleeds. She does have humidification on it but it does not seem     to help. She bought an Hypecal machine and has been using it when she has to go     out of the home but she has been isolated in her home secondary to COVID-19. She    has an anoro inhaler but has not been using it on a daily basis. She complains     of having thick mucous production that chokes her, it improves when she uses her    flutter valve and nebulizer. She is requesting an antibiotics to have on hand     when she does have a flare so she does not have to come out of the house. She is    not on any systemic steroids.                          Past Med History       Pt Past History: COPD            Former Smoker: Pt started smoking at 19 years of age, 1 1/2 ppd x 30 years, quit    the year 1985            Flu and Pneumonia Vaccines: Current      Overview of Symptoms      Pt complains of: Wheezing, SOA            Pt denies: fever, chills, nausea, vomiting            Plan/Instructions      Ambulatory Assessment/Plan:        Notes      New Medications      * dilTIAZem LA (Cardizem LA) 240 MG TAB.ER.24H: 240 MG PO QDAY 30 Days #30      * Doxycycline Hyclate (Doxycycline Hyclate*) 100 MG CAPSULE: 100 MG PO BID 7       Days #14      Discontinued Medications      * amLODIPine 2.5 MG TABLET: 2.5 MG PO QDAY #30      * predniSONE 20 MG TABLET: 40 MG PO QDAY #10      * Azithromycin Z-Sanket (Zithromax Z-Sanket) 250 MG TABLET: 250 MG PO ASDIR #1         Instructions: Take 500 mg (two tablets) by mouth the first day, then 250 mg        (one tablet) daily until all taken.      * Azithromycin Z-Sanket (Zithromax Z-Sanket) 250 MG TABLET: 250 MG PO ASDIR #1         Instructions: Take 500 mg (two tablets) by mouth the first day, then 250 mg        (one tablet) daily until all taken.      Plan/Instructions      * Plan Of Care: ()            * Chronic conditions reviewed and taken into consideration for today's treatment       plan.      * Patient instructed to seek medical attention urgently for new or worsening       symptoms.      * Patient was educated/instructed on their diagnosis, treatment and medications       prior to discharge from the clinic today.            ASSESSMENT:      1. Recurrent bronchitis.       2. Mycobacterium avium infection of the lung intolerant to triple antibiotics     therapy.       3. Chronic hypoxic respiratory failure.       4. Bronchiectasis.       5. Difficulty with airway clearance.       6. Chronic obstructive pulmonary disease.             PLAN:      1. I encouraged the patient to use her anoro every day regardless of how she     feels.       2. She should increase her  airway clearance to at least twice daily with flutter     valve and nebulizer. I told her to uses these every 4 hours while she is having     a flare. I did call in doxycycline 100 mg PO twice daily for 7 days #14 to have     on hand for when he mucous gets thicker or changes colors as she would be     having an exacerbation of her bronchiectasis/bronchitis.       3. She can put nasal lubricant in the nose or Vaseline to help with the nose     bleeds.       4. Follow up in 3 months.            Electronically signed by ROSITA GREEN  05/07/2020 16:08       Disclaimer: Converted document may not contain table formatting or lab diagrams. Please see [employer] for the authenticated document.

## 2021-05-28 NOTE — PROGRESS NOTES
Patient: ANTONY MARTINEZ     Acct: [id number]     Report: #[id number]  UNIT #: [id number]     : 1925    Encounter Date:2020  PRIMARY CARE: DAYDAY MARY DO  ***Signed***  --------------------------------------------------------------------------------------------------------------------  Chief Complaint      Encounter Date      Dec 8, 2020            Primary Care Provider      DAYDAY MARY DO            Referring Provider            [facility]            Patient Complaint      Patient is complaining of      PT here today for F/U, Bronchiectasis            VITALS      Height 58 in / 147.32 cm      Weight 133 lbs  / 60.018121 kg      BSA 1.53 m2      BMI 27.8 kg/m2      Temperature 96.8 F / 36 C - Temporal      Pulse 80      Respirations 15      Blood Pressure 140/60 Sitting, Left Arm      Pulse Oximetry 98%, nasal canula , 2 lpm            HPI      The patient is a 89 year old  female patient of Dr. Benítez's     with history of chronic mycobacterium avium disease unable to tolerate triple     antibiotics therapy. The patient also has chronic hypoxic respiratory failure.     The patient's niece is present with the patient today and is also providing     history. The patient was started on Perforomist, budesonide and Yupelri however     the patient states she did not feel like the nebulizer helped so she stopped     taking them and restarted anoro. The patient states she uses her levalbuterol     nebulizer as well. The patient states she has flutter valve but unfortunately     does not use it every day as prescribed. The patient states at times she does     have thick secretions that are hard to cough up. The patient states she also has    intermittent wheezing. The patient denies any fever or chills, night sweats,     hemoptysis,  purulent sputum production, swollen glands in head and neck,     unintentional weight loss, chest pain or chest tightness, abdominal pain, nausea    or vomiting or  diarrhea. The patient denies  any headaches, myalgias, sore     throat, changes in sense of taste and smell any coronavirus or flu like     symptoms.  The patient denies any leg swelling, orthopnea or paroxysmal     nocturnal dyspnea. The patient is under the care of Dr. Chaney for chronic     kidney disease and is now followed by Dr. Shultz for congestive heart failure. The    patient states she is on 2 liters of oxygen continuously via nasal cannula. The     patient states she is activities of daily living without oxygen in place. The     patient states she has not had to take any antibiotics or steroids since her     last office visit and denies any hospitalizations since her last office visit.     The patient reports she quit smoking in 1985.             I reviewed the Review of Systems, medical, surgical and family history and agree    with those as entered.      Copies To:   LAINEY VELASQUEZ      Constitutional:  Denies: Fatigue, Fever, Weight gain, Weight loss, Chills,     Insomnia, Other      Respiratory/Breathing:  Complains of: Shortness of air, Wheezing, Cough; Denies:    Hemoptysis, Pleuritic pain, Other      Endocrine:  Denies: Polydipsia, Polyuria, Heat/cold intolerance, Abnorml     menstrual pattern, Diabetes, Other      Eyes:  Denies: Blurred vision, Vision Changes, Other      Ears, nose, mouth, throat:  Denies: Mouth lesions, Thrush, Throat pain,     Hoarseness, Allergies/Hay Fever, Post Nasal Drip, Headaches, Recent Head Injury,    Nose Bleeding, Neck Stiffness, Thyroid Mass, Hearing Loss, Ear Fullness, Dry     Mouth, Nasal or Sinus Pain, Dry Lips, Nasal discharge, Nasal congestion, Other      Cardiovascular:  Denies: Palpitations, Syncope, Claudication, Chest Pain, Wake     up Gasping for air, Leg Swelling, Irregular Heart Rate, Cyanosis, Dyspnea on     Exertion, Other      Gastrointestinal:  Denies: Nausea, Constipation, Diarrhea, Abdominal pain,     Vomiting, Difficulty Swallowing,  Reflux/Heartburn, Dysphagia, Jaundice, Bloating    , Melena, Bloody stools, Other      Genitourinary:  Denies: Urinary frequency, Incontinence, Hematuria, Urgency,     Nocturia, Dysuria, Testicular problems, Other      Musculoskeletal:  Denies: Joint Pain, Joint Stiffness, Joint Swelling, Myalgias,    Other      Hematologic/lymphatic:  DENIES: Lymphadenopathy, Bruising, Bleeding tendencies,     Other      Neurological:  Denies: Headache, Numbness, Weakness, Seizures, Other      Psychiatric:  Denies: Anxiety, Appropriate Effect, Depression, Other      Sleep:  No: Excessive daytime sleep, Morning Headache?, Snoring, Insomnia?, Stop    breathing at sleep?, Other      Integumentary:  Denies: Rash, Dry skin, Skin Warm to Touch, Other      Immunologic/Allergic:  Denies: Latex allergy, Seasonal allergies, Asthma,     Urticaria, Eczema, Other      Immunization status:  No: Up to date            FAMILY/SOCIAL/MEDICAL HX      Surgical History:  Yes: Appendectomy, Cholecystectomy; No: AAA Repair, Abdominal    Surgery, Angioplasty, Back Surgery, Bladder Surgery, Bowel Surgery, Breast     Surgery, CABG, Carotid Stenosis, Ear Surgery, Eye Surgery, Head Surgery, Hernia     Surgery, Kidney Surgery, Nose Surgery, Oral Surgery, Orthopedic Surgery,     Prostatectomy, Rectal Surgery, Spinal Surgery, Testicular Surgery, Throat Wang    rgery, Valve Replacement, Vascular Surgery, Other Surgeries      Stroke - Family Hx:  Brother, Aunt      Heart - Family Hx:  Mother, Brother      Diabetes - Family Hx:  Mother, Brother, Aunt      Is Father Still Living?:  No      Is Mother Still Living?:  No      Social History:  No Tobacco Use, No Alcohol Use, No Recreational Drug use      Smoking status:  Former smoker (18yo, 1.5 ppd, quit 1983)      Hysterectomy:  Yes      Anticoagulation Therapy:  No      Antibiotic Prophylaxis:  No      Medical History:  Yes: Arthritis (GENERAL), Asthma (ASTHAMATIC BRONCHITIS),     Chronic Bronchitis/COPD, Congestive  Heart Failu, Depression, Diabetes (TYPE II     ), Gout, Hiatal Hernia, High Blood Pressure, Kidney or Bladder Disease,     Shortness Of Breath (COPD, PNEUMONIA); No: Anemia, Blood Disease, Broken Bones,     Cataracts, Chemical Dependency, Chemotherapy/Cancer, Emphysema, Chronic Liver     Disease, Colon Trouble, Colitis, Diverticulitis, Deafness or Ringing Ears,     Anxiety, Bipolar Disorder, Epilepsy, Seizures, Glaucoma, Gall Stones, Head     Injury, Heart Attack, Heart Murmur, Hemorrhoids/Rectal Prob (REFLUX), Hepatitis,    HIV (Do not ask - volu, Kidney Stones, Migrane Headaches, Mitral Valve Prolapse,    Psychiatric Care, Reflux Disease, Rheumatic Fever, Sexually Transmitted Dis,     Sinus Trouble, Skin Disease/Psoriais/Ecz, Stroke, Thyroid Problem, Tuberculosis     or Pos TB Te, Miscellaneous Medical/oth      Psychiatric History      DEPRESSION            PREVENTION      Hx Influenza Vaccination:  Yes      Date Influenza Vaccine Given:  Oct 1, 2020      Influenza Vaccine Declined:  No      2 or More Falls in Past Year?:  No      Fall Past Year with Injury?:  No      Hx Pneumococcal Vaccination:  Yes      Encouraged to follow-up with:  PCP regarding preventative exams.      Chart initiated by      Petra Montez CMA            ALLERGIES/MEDICATIONS      Allergies:        Coded Allergies:             *No Known Allergies (Verified  Allergy, Mild, 9/9/20)      Medications    Last Reconciled on 12/8/20 11:15 by Aravind SAMUEL-Budesonide (Pulmicort) 0.5 Mg/2 Ml Ampul.neb      0.5 MG INH RTBID for 30 Days, #60 NEB 4 Refills         Prov: ELINOR VILLARREAL [facility]         12/8/20       Neb-NaCl 3% (Sodium Chloride 3% Neb) 4 Ml Vial.neb      4 ML INH RTBID for 30 Days, #60 NEB 3 Refills         [facility]         12/8/20       Umeclidinium/Vilanterol 62.5-25 Mcg Inh (Anoro Ellipta 62.5-25 Mcg Inh) 1 Each     Blst.w.dev      1 PUFF INH QDAY, #1 INH 0 Refills         Reported         12/8/20        Lisinopril* (Lisinopril*) 2.5 Mg Tablet      2.5 MG PO QDAY, #30 TAB 0 Refills         Reported         12/8/20       Potassium Chloride ER (Potassium Chloride ER) 8 Meq Capsule.er      8 MEQ PO QDAY for 30 Days, #30 CAP.SA         Reported         12/8/20       Mirtazapine (Mirtazapine*) 7.5 Mg Tablet      7.5 MG PO HS, #30 TAB 0 Refills         Reported         9/9/20       Spacer (Spacer) 1 Each Each      EACH XX ONCE, #1 0 Refills         Prov: ROSITA GREEN         7/15/20       NEB-Levalbuterol (LEVALBUTEROL HCL) 1.25 Mg/3 Ml Vial.neb      1.25 MG INH RTQ4H PRN for SHORTNESS OF BREATH, #180 NEB         Reported         7/15/20       Carvedilol (Carvedilol) 6.25 Mg Tablet      12.5 MG PO BID, #120 TAB 0 Refills         Reported         7/15/20       Insulin Degludec (Tresiba Flextouch U-200) 200 Unit/1 Ml Insuln.pen      8 UNITS SUBQ QDAY for 30 Days, #1 INSULN.PEN         Reported         6/12/19       Furosemide* (Lasix*) 40 Mg Tablet      40 MG PO BID@09,17, #60 TAB 0 Refills         Prov: Sumit Moreno         2/9/19       Insulin Lispro (HumaLOG VIAL) 100 Units/Ml Vial      0 SUBQ QID INSULIN, #1 VIAL 0 Refills         Reported         2/6/19       (Oxygen) (OXYGEN)   No Conflict Check      2 L NS PRN PRN for SHORTNESS OF BREATH         Reported         12/6/18       Montelukast Sodium (Montelukast*) 10 Mg Tablet      10 MG PO HS, TAB         Reported         9/25/13      Current Medications      Current Medications Reviewed 12/8/20            EXAM      Vital Signs Reviewed      Gen: WDWN, Alert, NAD.        HEENT:  PERRL, EOMI.  OP, nares clear, no sinus tenderness.      Neck:  Supple, no JVD, no thyromegaly.      Lymph: No axillary, cervical, supraclavicular lymphadenopathy noted bilaterally.      Chest: Poor aeration, audible wheezing, decreased breath sounds throughout, no     rales or rhonchi, normal work of breathing noted. The patient is on 2 liters of     oxygen via nasal cannula.        CV:   RRR, no MGR, pulses 2+, equal.      Abd:  Soft, NT, ND, + BS, no HSM.      EXT:  No clubbing, no cyanosis, no edema, no joint tenderness.       Neuro:  A  Skin: No rashes or lesions.      Vtials      Vitals:             Height 58 in / 147.32 cm           Weight 133 lbs  / 60.098143 kg           BSA 1.53 m2           BMI 27.8 kg/m2           Temperature 96.8 F / 36 C - Temporal           Pulse 80           Respirations 15           Blood Pressure 140/60 Sitting, Left Arm           Pulse Oximetry 98%, nasal canula , 2 lpm            REVIEW      Results Reviewed      [facility] Results Reviewed?:  Yes Prev Lab Results, Yes Prev Radiology Results, Yes     Previous M[facility] Records      Lab Results      I personally reviewed Dr. Benítez's last office note.            Assessment      Wheezing - R06.2            Notes      New Medications      * Potassium Chloride ER 8 MEQ CAPSULE.ER: 8 MEQ PO QDAY 30 Days #30      * Lisinopril* 2.5 MG TABLET: 2.5 MG PO QDAY #30      * Umeclidinium/Vilanterol 62.5-25 Mcg Inh (Anoro Ellipta 62.5-25 Mcg Inh) 1 EACH       BLST.W.DEV: 1 PUFF INH QDAY #1      * Neb-NaCl 3% (Sodium Chloride 3% Neb) 4 ML VIAL.NEB: 4 ML INH RTBID 30 Days #60      Renewed Medications      * Neb-Budesonide (Pulmicort) 0.5 MG/2 ML AMPUL.NEB: 0.5 MG INH RTBID 30 Days #60         Instructions: DIAGNOSIS CODE REQUIRED PRIOR TO PRESCRIBING.      Discontinued Medications      * Formoterol Fumarate (Perforomist) 20 MCG/2 ML VIAL.NEB: 20 MCG INH BID 30 Days       #120      * Neb-NaCl 3% (Sodium Chloride 3% Neb) 4 ML VIAL.NEB: 4 ML INH RTBID 30 Days #60      * predniSONE (Deltasone) 10 MG TABLET: 10 MG PO ASDIR #45         Instructions: [id number],[id number],86gjf8m,77hdz2c,64jve4c      * AMOXICILLIN/CLAVULANATE K (Augmentin 875/125 Mg) 1 EACH TABLET: 875 MG PO BID       7 Days #14      * REVEFENACIN (YUPELRI) 175 MCG/3 ML NEBU          Sample - Qty 2      * Formoterol Fumarate (Perforomist) 20 MCG/2 ML VIAL.NEB          Sample - Qty 2       * REVEFENACIN (YUPELRI) 175 MCG/3 ML NEBU: 175 MCG INH RTQDAY 30 Days #90         Instructions: DX: J44.1      New Diagnostics      * Chest 2 View, 1 DAY         Dx: Wheezing - R06.2      ASSESSMENT:      1. Bronchiectasis.       2. History of chronic mycobacterium avium disease unable to tolerate triple     antibiotics therapy.       3. Difficulty with airway clearance.      4. History of end stage renal disease recently on dialysis now off dialysis     followed by Dr. Chaney.       5. Congestive heart failure under the care of Dr. Shultz.       6. Acute on chronic hypoxic respiratory failure on continuous oxygen.      7. Tobacco abuse of cigarettes in remission, the patient reports she quit     smoking in 1985.              PLAN:      1. The patient did not feel the nebulizer treatment were beneficial and     restarted anoro on her own. Continue anoro everyday as prescribed. I recommend     the patient restart pulmicort twice daily. The patient states she will try     pulmicort again to see if it helps with her wheezing.       2. I will start the patient on sodium chloride nebulizer treatments to use twice     daily with her flutter valve. The patient is advised to use her flutter valve     twice daily with levalbuterol and sodium chloride nebulizer to assist with     airway clearance.       3. Continue supplemental oxygen to keep oxygen saturation at or above 89%.      4. Follow up with Dr. Chaney for chronic kidney disease as scheduled.       5. Follow up with Dr. Shultz for congestive heart failure.       6. The patient is advised to call the office, call 911 or go to the ER for any     new or worsening symptoms.       7. The patient reports she is up to date with flu and pneumonia vaccines.      8. The patient does not want any steroids at this time or any additional     medication at this time.       9. Continue Singulair everyday as prescribed.       10. I will order a chest x-ray.        11. The patient  requests to follow up with Dr. Marcum in 2 months sooner if     needed.            Patient Education      Patient Education Provided:  COPD      Time Spent:  > 50% /Coord Care            Electronically signed by ELINOR VILLARREAL [facility]  12/10/2020 09:23       Disclaimer: Converted document may not contain table formatting or lab diagrams. Please see [employer] for the authenticated document.

## 2021-05-28 NOTE — PROGRESS NOTES
Patient: ANTONY MARTINEZ     Acct: [id number]     Report: #[id number]  UNIT #: [id number]     : 1925    Encounter Date:10/30/2019  PRIMARY CARE: DAYDAY MARY DO  ***Signed***  --------------------------------------------------------------------------------------------------------------------  Chief Complaint      Encounter Date      Oct 30, 2019            Primary Care Provider      Nisreen Mccann            Referring Provider      BECKI GLOVER            [facility]            Patient Complaint      Patient is complaining of      f/u copd            VITALS      Height 5 ft 0 in / 152.4 cm      Weight 167 lbs 0 oz / 75.938418 kg      BSA 1.73 m2      BMI 32.6 kg/m2      Temperature 97.2 F / 36.22 C - Tympanic      Pulse 92      Respirations 18      Blood Pressure 134/70 Sitting, Left Arm      Pulse Oximetry 94%, Room air      Initial Exhaled Nitrous Oxide      Exhaled Nitrous Oxide Results:  7            HPI      The patient is a very pleasant 89 year old female with history of mycobacterium     avium disease and chronic obstructive pulmonary disease, chronic hypoxic     respiratory failure. She is experiencing chronic shortness of breath, dyspnea     and she also has heart failure. She wheezes when he weight gets up. She has been    watching her sodium intake. She is on oxygen  followed by Dr. Loza and     takes Xopenex and anoro.            ROS      Constitutional:  Complains of: Fatigue, Other; Denies: Fever, Weight gain,     Weight loss, Chills, Insomnia      Respiratory/Breathing:  Complains of: Shortness of air, Wheezing      Endocrine:  Complains of: Diabetes; Denies: Polydipsia, Polyuria, Heat/cold     intolerance, Abnorml menstrual pattern      Eyes:  Complains of: Vision Changes; Denies: Blurred vision      Ears, nose, mouth, throat:  Complains of: Other; Denies: Congestion, Dysphagia,     Hearing Changes, Nose Bleeding, Nasal Discharge, Throat pain, Tinnitus      Cardiovascular:  Complains of:  Exertional dyspnea; Denies: Chest Pain,     Peripheral Edema, Palpitations, Syncope, Wake up Gasping for air, Orthopnea,     Tachycardia      Gastrointestinal:  Denies: Abdominal pain/cramping, Bloody stools, Constipation,    Diarrhea, Melena, Nausea, Vomiting      Genitourinary:  Denies: Dysuria, Urinary frequency, Incontinence, Hematuria,     Urgency      Musculoskeletal:  Denies: Joint Pain, Joint Stiffness, Joint Swelling, Myalgias      Hematologic/lymphatic:  DENIES: Lymphadenopathy, Bruising, Bleeding tendencies      Neurologic:  Denies: Headache, Numbness, Weakness, Seizures      Psychiatric:  Denies: Anxiety, Appropriate Effect, Depression      Sleep:  No: Excessive daytime sleep, Morning Headache?, Snoring, Insomnia?, Stop    breathing at sleep?      Integumentary:  Denies: Rash, Dry skin, Skin Warm to Touch            FAMILY/SOCIAL/MEDICAL HX      Surgical History:  Yes: Appendectomy, Cholecystectomy; No: AAA Repair, Abdominal    Surgery, Angioplasty, Back Surgery, Bladder Surgery, Bowel Surgery, Breast     Surgery, CABG, Carotid Stenosis, Ear Surgery, Eye Surgery, Head Surgery, Hernia     Surgery, Kidney Surgery, Nose Surgery, Oral Surgery, Orthopedic Surgery,     Prostatectomy, Rectal Surgery, Spinal Surgery, Testicular Surgery, Throat     Surgery, Valve Replacement, Vascular Surgery, Other Surgeries      Stroke - Family Hx:  Brother, Aunt      Heart - Family Hx:  Mother, Brother      Diabetes - Family Hx:  Mother, Brother, Aunt      Is Father Still Living?:  No      Is Mother Still Living?:  No       Family History:  Yes      Social History:  No Tobacco Use, No Alcohol Use, No Recreational Drug use      Smoking status:  Former smoker (2 ppd x20 years )      Hysterectomy:  Yes      Anticoagulation Therapy:  No      Antibiotic Prophylaxis:  No      Medical History:  Yes: Arthritis (GENERAL), Asthma (ASTHAMATIC BRONCHITIS),     Chronic Bronchitis/COPD, Congestive Heart Failu, Diabetes (TYPE II ), Gout,      Hiatal Hernia, High Blood Pressure, Shortness Of Breath (COPD, PNEUMONIA); No:     Alcoholism, Allergies, Anemia, Blood Disease, Broken Bones, Cataracts, Chemical     Dependency, Chemotherapy/Cancer, Emphysema, Chronic Liver Disease, Colon     Trouble, Colitis, Diverticulitis, Deafness or Ringing Ears, Convulsions,     Depression, Anxiety, Bipolar Disorder, Epilepsy, Seizures, Forgetfullness,     Glaucoma, Gall Stones, Head Injury, Heart Attack, Heart Murmur,     Hemorrhoids/Rectal Prob (REFLUX), Hepatitis, High Cholesterol, HIV (Do not ask -    volu, Jaundice, Kidney or Bladder Disease, Kidney Stones, Migrane Headaches,     Mitral Valve Prolapse, Night sweats, Phlebitis, Psychiatric Care, Reflux     Disease, Rheumatic Fever, Sexually Transmitted Dis, Sinus Trouble, Skin     Disease/Psoriais/Ecz, Stroke, Thyroid Problem, Tuberculosis or Pos TB Te, Miscel    laneous Medical/oth            PREVENTION      Hx Influenza Vaccination:  Yes      Date Influenza Vaccine Given:  Oct 1, 2019      Influenza Vaccine Declined:  No      2 or More Falls Past Year?:  No      Fall Past Year with Injury?:  No      Hx Pneumococcal Vaccination:  Yes      Encouraged to follow-up with:  PCP regarding preventative exams.      Chart initiated by      Cecy Eason MA            ALLERGIES/MEDICATIONS      Allergies:        Coded Allergies:             *No Known Allergies (Verified  Allergy, Mild, 10/30/19)      Medications    Last Reconciled on 6/12/19 12:37 by ROSITA GREEN DO      Umeclidinium/Vilanterol 62.5-25 Mcg Inh (Anoro Ellipta 62.5-25 Mcg Inh) 1 Each     Blst.w.dev      1 PUFF INH QDAY, #1 INH 0 Refills         Prov: ROSITA GREEN         6/12/19       Irbesartan (Irbesartan) 75 Mg Tablet      75 MG PO QDAY for 30 Days, #30 TAB         Reported         6/12/19       Insulin Degludec (Tresiba Flextouch U-200) 200 Unit/1 Ml Insuln.pen      8 UNITS SUBQ QDAY for 30 Days, #1 INSULN.PEN         Reported         6/12/19        amLODIPine (amLODIPine) 2.5 Mg Tablet      2.5 MG PO QDAY, #30 TAB 0 Refills         Prov: Sumit Moreno         2/9/19       Furosemide* (Lasix*) 40 Mg Tablet      40 MG PO BID@09,17, #60 TAB 0 Refills         Prov: Sumit Moreno         2/9/19       Insulin Lispro (HumaLOG VIAL) 100 Units/Ml Vial      0 SUBQ QID INSULIN, #1 VIAL 0 Refills         Reported         2/6/19       (Oxygen) (OXYGEN)   No Conflict Check      2 L NS PRN PRN for SHORTNESS OF BREATH         Reported         12/6/18       Allopurinol (Allopurinol) 100 Mg Tablet      100 MG PO BID, #30 TAB 0 Refills         Reported         10/2/18       Neb-Levalbuterol (Xopenex) 1.25 Mg/3 Ml Vial.neb      1.25 MG INH RTQ6H, #120 ML 2 Refills         Prov: ROSITA GREEN         4/17/18       Aspirin Chew (Aspirin Chew) 81 Mg Tab.chew      81 MG PO QAM, #30 TAB.CHEW 0 Refills         Reported         9/14/17       Montelukast Sodium (Montelukast*) 10 Mg Tablet      10 MG PO HS, TAB         Reported         9/25/13      Current Medications      Current Medications Reviewed 10/30/19            EXAM      VITAL SIGNS:  Reviewed.        NECK:  Supple without tracheal deviation or lymphadenopathy.  No thyromegaly     appreciated.      LYMPHATICS:  No cervical or supraclavicular lymphadenopathy.      HEENT: Pupils are equal, round and reactive to light. There is no scleral     icterus.  Nares patent without hypertrophy of the turbinates. No erythema of the     passages.  TMs are clear bilaterally with good cone of light. The posterior     pharynx is without  lesions or erythema.      RESPIRATORY:  Clear to auscultation bilaterally.  Expiratory wheezes no rales or     rhonchi.  Tympanic to percussion.        CARDIOVASCULAR:  Irregular rate and rhythm.  No murmurs, gallops or rubs.  Lowe     extremity edema bilaterally.  Equal radial pulses.        GI: Soft, nontender, nondistended, no organomegaly.  Bowel sounds present in all     four quadrants.       MUSCULOSKELETAL:  No joint effusions, erythema or warmth over the major joint     systems.      SKIN:  No rashes or lesions.      NEUROLOGIC: Cranial nerves II-XII are intact bilaterally.  Moves all     extremities. Ambulates with ease.      PSYCH:  Appropriate mood and affect.      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      Changed Medications      * Neb-Levalbuterol (Xopenex) 1.25 MG/3 ML VIAL.NEB: 1.25 MG INH RTQ6H #120         Instructions: Dx: J44.9      Discontinued Medications      * NEBULIZER ACCESSORIES (Nebulizer Kit PURA) 1 EACH KIT: EACH XX ONCE #1         Instructions: Use as directed to administer nebulized medications.      * ARFORMOTEROL TARTRATE (Brovana) 15 MCG/2 ML VIAL.NEB: 15 MCG INH BID 60 Days       #60         Instructions: DX: J44.9 NPI: [id number]      * Neb-Budesonide (Budesonide) 0.5 MG/2 ML AMPUL.NEB: 0.5 MG INH BID 60 Days #60         Instructions: DX: J44.9 NPI: [id number]      * Azithromycin 250 MG TABLET: 250 MG PO ASDIR #6         Instructions: Take 500 mg (two tablets) on the first day, then 250 mg (one        tablet) once a day until all gone.      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 Chronic obstructive pulmonary disease exacerbation.       2. Heart failure without acute exacerbation.       3. History of mycobacterium avium intolerant to therapy.             PLAN:        1.  I have called in prednisone 40 mg X 5 days without a taper and a Z-pack.       2. Continue anoro. I have refilled this today.       3. Follow up in 6 months.            Patient Education      Education resources provided:  Yes      Patient Education Provided:  Acute Bronchitis, COPD      Time Spent:  > 50% /[facility]            Electronically signed by ROSITA GREEN  08/26/2020 10:55       Disclaimer: Converted document may not contain table formatting or lab diagrams. Please see [employer] for the authenticated document.

## 2021-05-28 NOTE — PROGRESS NOTES
Patient: ANTONY MARTINEZ     Acct: [id number]     Report: #[id number]  UNIT #: [id number]     : 1925    Encounter Date:07/15/2020  PRIMARY CARE: DAYDAY MARY DO  ***Signed***  --------------------------------------------------------------------------------------------------------------------  Chief Complaint      Encounter Date      Jul 15, 2020            Primary Care Provider      DAYDAY MARY DO            Referring Provider            [facility]            Patient Complaint      Patient is complaining of      3 month follow up/soa            VITALS      Height 5 ft  / 152.4 cm      Weight 167 lbs 0 oz / 75.145951 kg      BSA 1.73 m2      BMI 32.6 kg/m2      Temperature 97.3 F / 36.28 C - Tympanic      Pulse 93      Respirations 16      Blood Pressure 110/60 Sitting, Right Arm      Pulse Oximetry 85%, room air      Initial Exhaled Nitrous Oxide      Exhaled Nitrous Oxide Results:  7            HPI      The patient is a 89 year old  female who presents today for     follow up regarding dyspnea, chronic hypoxic respiratory failure and     bronchiectasis. The patient has not been well over the past 3 months complaining    of worsening dyspnea, chest tightness, inability to walk across flat ground more    than a couple steps without feeling like she is going to stop breathing. She is     unable to lie flat. Chest x-ray was obtained in early May that showed worsening     left pleural effusion and congestive heart failure. I called in 2 weeks of     Bumex. She was also referred to Dr. Shultz for evaluation of heart failure by     SIDNEY Redmond. Unfortunately the patient got worse and had her family     take her to [facility] in [place] for evaluation of her shortness of     breath and she was diagnosed with worsening heart failure. Her ejection fraction    was estimated to be around 30%. She had worsening kidney failure while she was     in the hospital and ultimately a tunnel  dialysis catheter was inserted and she     was started on dialysis. The patient is now following with Dr. Chaney and Dr. Shipley's group and getting dialysis three times a week in [place]. She was     sent home with a Life Vest secondary to her very poor ejection fraction but she     quit using it. The patient's niece is with her today and she is quite concerned     about her aunt saying she wants to sit in a dark room all day and cry. Her     primary care provider has prescribed an antidepressant with a stimulant for her     appetite and the niece says she has improved since being on this medicine. The     patient is using 2 liters of oxygen via her Inogen at all times. She quit using     her flutter valve, she is using anoro but does not get it deep into her lungs     per her niece.  She continues to cough up balls of phlegm which cause her to     choke and gag. She had quit using her saline nebulizer as well.            ROS      Constitutional:  Complains of: Fatigue; Denies: Fever, Weight gain, Weight loss,    Chills, Insomnia, Other      Respiratory/Breathing:  Complains of: Shortness of air, Wheezing, Cough; Denies:    Hemoptysis, Pleuritic pain, Other      Endocrine:  Denies: Polydipsia, Polyuria, Heat/cold intolerance, Abnorml me    nstrual pattern, Diabetes, Other      Eyes:  Denies: Blurred vision, Vision Changes, Other      Ears, nose, mouth, throat:  Denies: Mouth lesions, Thrush, Throat pain,     Hoarseness, Allergies/Hay Fever, Post Nasal Drip, Headaches, Recent Head Injury,    Nose Bleeding, Neck Stiffness, Thyroid Mass, Hearing Loss, Ear Fullness, Dry     Mouth, Nasal or Sinus Pain, Dry Lips, Nasal discharge, Nasal congestion, Other      Cardiovascular:  Complains of: Leg Swelling, Irregular Heart Rate, Dyspnea on     Exertion; Denies: Palpitations, Syncope, Claudication, Chest Pain, Wake up     Gasping for air, Cyanosis, Other      Gastrointestinal:  Denies: Nausea, Constipation, Diarrhea,  Abdominal pain,     Vomiting, Difficulty Swallowing, Reflux/Heartburn, Dysphagia, Jaundice,     Bloating, Melena, Bloody stools, Other      Genitourinary:  Complains of: Other (esrd now on HD); Denies: Urinary frequency,    Incontinence, Hematuria, Urgency, Nocturia, Dysuria      Musculoskeletal:  Denies: Joint Pain, Joint Stiffness, Joint Swelling, Myalgias,    Other      Hematologic/lymphatic:  COMPLAINS OF: Bleeding tendencies; DENIES:     Lymphadenopathy, Bruising, Other      Neurological:  Denies: Headache, Numbness, Weakness, Seizures, Other      Psychiatric:  Denies: Anxiety, Appropriate Effect, Depression, Other      Sleep:  No: Excessive daytime sleep, Morning Headache?, Snoring, Insomnia?, Stop    breathing at sleep?, Other      Integumentary:  Denies: Rash, Dry skin, Skin Warm to Touch, Other      Immunologic/Allergic:  Denies: Latex allergy, Seasonal allergies, Asthma,     Urticaria, Eczema, Other      Immunization status:  No: Up to date            FAMILY/SOCIAL/MEDICAL HX      Surgical History:  Yes: Appendectomy, Cholecystectomy; No: AAA Repair, Abdominal    Surgery, Angioplasty, Back Surgery, Bladder Surgery, Bowel Surgery, Breast     Surgery, CABG, Carotid Stenosis, Ear Surgery, Eye Surgery, Head Surgery, Hernia     Surgery, Kidney Surgery, Nose Surgery, Oral Surgery, Orthopedic Surgery,     Prostatectomy, Rectal Surgery, Spinal Surgery, Testicular Surgery, Throat     Surgery, Valve Replacement, Vascular Surgery, Other Surgeries      Stroke - Family Hx:  Brother, Aunt      Heart - Family Hx:  Mother, Brother      Diabetes - Family Hx:  Mother, Brother, Aunt      Is Father Still Living?:  No      Is Mother Still Living?:  No       Family History:  Yes      Social History:  No Tobacco Use, No Alcohol Use, No Recreational Drug use      Smoking status:  Former smoker (2 ppd x20 years )      Hysterectomy:  Yes      Anticoagulation Therapy:  No      Antibiotic Prophylaxis:  No      Medical History:  Yes:  Arthritis (GENERAL), Asthma (ASTHAMATIC BRONCHITIS),     Chronic Bronchitis/COPD, Congestive Heart Failu, Diabetes (TYPE II ), Gout,     Hiatal Hernia, High Blood Pressure, Shortness Of Breath (COPD, PNEUMONIA IN     PAST); No: Alcoholism, Allergies, Anemia, Blood Disease, Broken Bones,     Cataracts, Chemical Dependency, Chemotherapy/Cancer, Emphysema, Chronic Liver     Disease, Colon Trouble, Colitis, Diverticulitis, Deafness or Ringing Ears,     Convulsions, Depression, Anxiety, Bipolar Disorder, Epilepsy, Seizures,     Forgetfullness, Glaucoma, Gall Stones, Head Injury, Heart Attack, Heart Murmur,     Hemorrhoids/Rectal Prob (REFLUX), Hepatitis, High Cholesterol, HIV (Do not ask -    volu, Jaundice, Kidney or Bladder Disease, Kidney Stones, Migrane Headaches,     Mitral Valve Prolapse, Night sweats, Phlebitis, Psychiatric Care, Reflux     Disease, Rheumatic Fever, Sexually Transmitted Dis, Sinus Trouble, Skin     Disease/Psoriais/Ecz, Stroke, Thyroid Problem, Tuberculosis or Pos TB Te,     Miscellaneous Medical/oth      Psychiatric History      none            PREVENTION      Hx Influenza Vaccination:  Yes      Date Influenza Vaccine Given:  Nov 1, 2018      Influenza Vaccine Declined:  No      2 or More Falls in Past Year?:  No      Fall Past Year with Injury?:  No      Hx Pneumococcal Vaccination:  Yes      Encouraged to follow-up with:  PCP regarding preventative exams.      Chart initiated by      gabriele carvalho ma            ALLERGIES/MEDICATIONS      Allergies:        Coded Allergies:             *No Known Allergies (Verified  Allergy, Mild, 7/15/20)      Medications    Last Reconciled on 7/15/20 12:47 by ROSITA GREEN,       Umeclidinium/Vilanterol 62.5-25 Mcg Inh (Anoro Ellipta 62.5-25 Mcg Inh) 1 Each     Blst.w.dev      1 PUFF INH QDAY for 90 Days, #3 INH 6 Refills         Prov: ROSITA GREEN         7/15/20       Spacer (Spacer) 1 Each Each      EACH XX ONCE, #1 0 Refills         Prov: ROSITA GREEN          7/15/20       NEB-Levalbuterol (LEVALBUTEROL HCL) 1.25 Mg/3 Ml Vial.neb      1.25 MG INH RTQ4H PRN for SHORTNESS OF BREATH, #180 NEB         Reported         7/15/20       Pantoprazole (Protonix) 40 Mg Tablet.dr      40 MG PO BIDAC, #60 TAB 0 Refills         Reported         7/15/20       Carvedilol (Carvedilol) 6.25 Mg Tablet      12.5 MG PO BID, #120 TAB 0 Refills         Reported         7/15/20       Potassium Chloride (K-Dur*) 10 Meq Tab.er.prt      10 MEQ PO once daily for 30 Days, #30 TAB 2 Refills         Prov: ELINOR VILLARREAL [facility]         5/12/20       dilTIAZem LA (Cardizem LA) 240 Mg Tab.er.24h      240 MG PO QDAY for 30 Days, #30 TAB.ER.24H         Reported         4/15/20       Insulin Degludec (Tresiba Flextouch U-200) 200 Unit/1 Ml Insuln.pen      8 UNITS SUBQ QDAY for 30 Days, #1 INSULN.PEN         Reported         6/12/19       Furosemide* (Lasix*) 40 Mg Tablet      40 MG PO BID@09,17, #60 TAB 0 Refills         Prov: Sumit Moreno         2/9/19       Insulin Lispro (HumaLOG VIAL) 100 Units/Ml Vial      0 SUBQ QID INSULIN, #1 VIAL 0 Refills         Reported         2/6/19       (Oxygen) (OXYGEN)   No Conflict Check      2 L NS PRN PRN for SHORTNESS OF BREATH         Reported         12/6/18       Allopurinol (Allopurinol) 100 Mg Tablet      100 MG PO BID, #30 TAB 0 Refills         Reported         10/2/18       Aspirin Chew (Aspirin Chew) 81 Mg Tab.chew      81 MG PO QAM, #30 TAB.CHEW 0 Refills         Reported         9/14/17       Montelukast Sodium (Montelukast*) 10 Mg Tablet      10 MG PO HS, TAB         Reported         9/25/13      Current Medications      Current Medications Reviewed 7/15/20            EXAM      VITAL SIGNS:  Reviewed.  Pulse oximeter was 85% on room air. She was placed on 2     liters of oxygen, it was rechecked and her oxygen saturation came up to 92%.       GENERAL: The patient appears stated age, she is fatigued, depressed appearing,     sitting in a wheelchair.         NECK:  Supple without tracheal deviation or lymphadenopathy.  No thyromegaly     appreciated.      LYMPHATICS:  No cervical or supraclavicular lymphadenopathy.      HEENT: Pupils are equal, round and reactive to light. There is no scleral     icterus. There is JVD appreciated.  Nares patent without hypertrophy of the     turbinates. No erythema of the passages.  TMs are clear bilaterally with good     cone of light. The posterior pharynx is without  lesions or erythema.      RESPIRATORY: Diminished breath sounds at the left base compared to the right,     dullness to percussion at the left base. Inspiratory squeaks and expiratory     wheezes with diffuse  rhonchi.          CARDIOVASCULAR: S4 gallop, no murmurs, displaced PMI.   2+ pitting bilateral     lower extremity edema.  Equal radial pulses.        GI: Soft, nontender, nondistended, no organomegaly.  Bowel sounds present in all     four quadrants.      MUSCULOSKELETAL:  No joint effusions, erythema or warmth over the major joint     systems.      SKIN:  No rashes or lesions.      NEUROLOGIC: Cranial nerves II-XII are intact bilaterally.  Moves all     extremities. Ambulates with ease.      PSYCH:  Appropriate mood and affect.      Vtials      Vitals:             Height 5 ft  / 152.4 cm           Weight 167 lbs 0 oz / 75.827754 kg           BSA 1.73 m2           BMI 32.6 kg/m2           Temperature 97.3 F / 36.28 C - Tympanic           Pulse 93           Respirations 16           Blood Pressure 110/60 Sitting, Right Arm           Pulse Oximetry 85%, room air            REVIEW      Results Reviewed      [facility] Results Reviewed?:  Yes [facility] Rad[facility], Yes [facility]      Lab Results      I personally reviewed my last Telehealth note and SIDNEY Redmond's last     Telehealth notes.      Radiographic Results               [facility] Diagnostic Img                PACS RADIOLOGY REPORT             Patient: ANTONY MARTINEZ   Acct: #[id number]   Report: #[id number]            UNIT #: [id number]    DOS: 20 1418      INSURANCE:MEDICARE PART A   LOCATION:[facility]     : 1925            PROVIDERS      ADMITTING:     ATTENDING: ROSITA GREEN      FAMILY:  NONE,MD   ORDERING:  ROSITA GREEN         OTHER:    DICTATING:  Pradeep Simons MD, IV            REQ #:[id number]   EXAM:CXR2 - CHEST 2V AP PA LAT      REASON FOR EXAM:        REASON FOR VISIT:  COUGH            *******Signed******         PROCEDURE:   CHEST AP/PA AND LATERAL             COMPARISON:   [facility], , CHEST PA/AP   14:57.             INDICATIONS:   COUGH AND SHORT OF BREATH MORE THAN USUAL FOR 3 DAYS. COPD.             FINDINGS:                The cardiac silhouette is enlarged.  There is increasing effusion and     consolidation in the lung       bases.  There appears to be bilateral pulmonary edema.  There is a dual lead     left-sided cardiac       pacemaker.             IMPRESSION:      Findings suggest worsening congestive heart failure.  Airspace consolidation in     the lung bases may       be secondary to atelectasis or pneumonia.                PRADEEP SIMONS MD             Electronically Signed and Approved By: PRADEEP SIMONS MD on 2020 at 15:24                   Until signed, this is an unconfirmed preliminary report that may contain      errors and is subject to change.                                              YANIQUEJE:      D:20 1524            Assessment      Notes      New Medications      * Carvedilol 6.25 MG TABLET: 12.5 MG PO BID #120      * PANTOPRAZOLE (Protonix) 40 MG TABLET.DR: 40 MG PO BIDAC #60         Instructions: Take on an empty stomach.      * NEB-Levalbuterol (LEVALBUTEROL HCL) 1.25 MG/3 ML VIAL.NEB: 1.25 MG INH RTQ4H       PRN SHORTNESS OF BREATH #180         Instructions: DIAGNOSIS CODE REQUIRED PRIOR TO PRESCRIBING.      * SPACER (Spacer) 1 EACH EACH: EACH XX ONCE #1          Instructions: Use as directed with inhalers      Renewed Medications      * Umeclidinium/Vilanterol 62.5-25 Mcg Inh (Anoro Ellipta 62.5-25 Mcg Inh) 1 EACH       BLST.W.DEV:         From: 1 PUFF INH QDAY #1         To: 1 PUFF INH QDAY 90 Days #3      Changed Medications      * dilTIAZem LA (Cardizem LA) 240 MG TAB.ER.24H: 240 MG PO QDAY 30 Days #30         Instructions: 1/2 tab      Discontinued Medications      * BUMETANIDE 1 MG TABLET: 1 MG PO BID #14      ASSESSMENT:        1.  Decompensated systolic congestive heart failure.       2.  Left pleural effusion.       3. End stage renal disease now on hemodialysis three times a week.       4.  Chronic bronchiectasis without acute exacerbation.       5. Difficulty with airway clearance, currently noncompliant with airway cleara    nce technique.       6. History of mycobacterium avium, intolerant to triple antibiotic therapy.       7. Chronic hypoxic respiratory failure on 2 liters of oxygen.             PLAN:      1. I have requested records from [facility] for her recent hospitalization     there.       2. I offered the patient a therapeutic and diagnostic thoracentesis on the left     given that she is still having dyspnea and difficulty lying flat despite having     dialysis and volume removed. She would rather not do this at this time and will     call back if she changes her mind.       3. I spent some time today encouraging her to do her airway clearance     techniques, use her flutter valve, start using saline nebulizer again as well as     levalbuterol. I gave her a sample of anoro and we called in a spacer so she can     use it with her PRN albuterol.       4. I spoke with the patient's niece in detail about my concerns that her aunt     had a limited time to live and I fear that she will not make past the next 6     months. We will be here to help her in any way.       5. Follow up with me in 1-2 months.            Patient Education      Education  resources provided:  Yes      Time Spent:  > 50% /Coord [facility] (thoracentesis options, airway clearance)            Electronically signed by ROSITA GREEN  07/23/2020 09:07       Disclaimer: Converted document may not contain table formatting or lab diagrams. Please see [employer] for the authenticated document.

## 2021-05-28 NOTE — PROGRESS NOTES
Patient: ANTONY MARTINEZ     Acct: [id number]     Report: #[id number]  UNIT #: [id number]     : 1925    Encounter Date:2018  PRIMARY CARE: DAYDAY MARY DO  ***Signed***  --------------------------------------------------------------------------------------------------------------------  Chief Complaint      Encounter Date      2018            Referring Provider      SELF,REFERRED PATIENT      Referring Provider not in look:        [facility]            Patient Complaint      Patient is complaining of      cough            VITALS      Height 5 ft 0 in / 152.4 cm      Weight 173 lbs 0 oz / 78.235971 kg      BSA 1.86 m2      BMI 33.8 kg/m2      Temperature 97.9 F / 36.61 C - Oral      Pulse 91      Respirations 14      Blood Pressure 157/75 Sitting, Left Arm      Pulse Oximetry 95%, roomair@rest      Exhaled Nitrous Oxide Testin            HPI      The patient is a 89 year old -American female who was recently     hospitalized at [facility] from 2018 through 01/15/2018 for     COPD exacerbation.  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      The patient returns today for follow up.            She states that she continues to have thick mucous that is hard to expectorate.     She has been doing Anoro once daily and albuterol/ipratropium nebs as needed.      Sometimes when she lies down at night, she gets choked up on her sputum and has     to get  up and cough violently and still is unable to expectorate it.  The     antibiotics that were started have been hard on her. She has had some diarrhea,     but states this has been a chronic problem for her prior to starting the     antibiotics, so she is unsure if it is truly the medication side effects.      Additionally, she has severe heartburn reflux for which she drinks Maalox all     throughout the day.  Currently, she is on a  steroid by mouth 20 mg daily.  She     is complaining of night sweats, and intermittent blurry vision.  She denies     fevers, chills or weight loss.            Past medical, family, social and surgical history updated in the chart,     unchanged since last visit.            ROS      Constitutional:  Denies: Fatigue, Fever, Weight gain, Weight loss, Chills,     Insomnia, Other      Respiratory/Breathing:  Complains of: Shortness of air, Wheezing, Cough, Denies    : Hemoptysis, Pleuritic pain, Other      Endocrine:  Denies: Polydipsia, Polyuria, Heat/cold intolerance, Abnorml     menstrual pattern, Diabetes, Other      Eyes:  Denies: Blurred vision, Vision Changes, Other      Ears, nose, mouth, throat:  Denies: Congestion, Dysphagia, Hearing Changes,     Nose Bleeding, Nasal Discharge, Throat pain, Tinnitus, Other      Cardiovascular:  Denies: Chest Pain, Exertional dyspnea, Peripheral Edema,     Palpitations, Syncope, Wake up Gasping for air, Orthopnea, Tachycardia, Other      Gastrointestinal:  Denies: Abdominal pain/cramping, Bloody stools, Constipation    , Diarrhea, Melena, Nausea, Vomiting, Other      Genitourinary:  Denies: Dysuria, Urinary frequency, Incontinence, Hematuria,     Urgency, Other      Musculoskeletal:  Denies: Joint Pain, Joint Stiffness, Joint Swelling, Myalgias    , Other      Hematologic/lymphatic:  DENIES: Lymphadenopathy, Bruising, Bleeding tendencies,     Other      Neurologic:  Denies: Headache, Numbness, Weakness, Seizures, Other      Psychiatric:  Denies: Anxiety, Appropriate Effect, Depression, Other      Sleep:  No: Excessive daytime sleep, Morning Headache?, Snoring, Insomnia?,     Stop breathing at sleep?, Other      Integumentary:  Denies: Rash, Dry skin, Skin Warm to Touch, Other            FAMILY/SOCIAL/MEDICAL HX      Surgical History:  Yes: Appendectomy, Cholecystectomy, No: AAA Repair,     Abdominal Surgery, Angioplasty, Back Surgery, Bladder Surgery, Bowel Surgery,     Breast  Surgery, CABG, Carotid Stenosis, Ear Surgery, Eye Surgery, Head Surgery,     Hernia Surgery, Kidney Surgery, Nose Surgery, Oral Surgery, Orthopedic Surgery,     Prostatectomy, Rectal Surgery, Spinal Surgery, Testicular Surgery, Throat     Surgery, Valve Replacement, Vascular Surgery, Other Surgeries      Stroke - Family Hx:  Brother, Aunt      Heart - Family Hx:  Mother, Brother      Diabetes - Family Hx:  Mother, Brother, Aunt      Is Father Still Living?:  No      Is Mother Still Living?:  No      Social History:  No Tobacco Use, No Alcohol Use, No Recreational Drug use      Smoking status:  Former smoker (2 ppd x20 years )      Hysterectomy:  Yes      Anticoagulation Therapy:  No      Antibiotic Prophylaxis:  No      Medical History:  Yes: Arthritis, Asthma (ASTHAMATIC BRONCHITIS), Chronic     Bronchitis/COPD, Congestive Heart Failu, Diabetes (TYPE II ), Gout, Hiatal     Hernia, High Blood Pressure, Shortness Of Breath (COPD, PNEUMONIA IN PAST), No:     Alcoholism, Allergies, Anemia, Blood Disease, Broken Bones, Cataracts, Chemical     Dependency, Chemotherapy/Cancer, Emphysema, Chronic Liver Disease, Colon Trouble    , Colitis, Diverticulitis, Deafness or Ringing Ears, Convulsions, Depression,     Anxiety, Bipolar Disorder, Epilepsy, Seizures, Forgetfullness, Glaucoma, Gall     Stones, Head Injury, Heart Attack, Heart Murmur, Hemorrhoids/Rectal Prob (REFLUX    ), Hepatitis, High Cholesterol, HIV (Do not ask - volu, Jaundice, Kidney or     Bladder Disease, Kidney Stones, Migrane Headaches, Mitral Valve Prolapse, Night     sweats, Phlebitis, Psychiatric Care, Reflux Disease, Rheumatic Fever, Sexually     Transmitted Dis, Sinus Trouble, Skin Disease/Psoriais/Ecz, Stroke, Thyroid     Problem, Tuberculosis or Pos TB Te, Miscellaneous Medical/oth            Hx Influenza Vaccination:  No (PT STATES SHE DOESN'T TAKE THEM)      Date Influenza Vaccine Given:  Nov 1, 2017      Influenza Vaccine Declined:  No      2 or More  Falls Past Year?:  No      Fall Past Year with Injury?:  No      Hx Pneumococcal Vaccination:  No (PT STATES THAT SHE DOESN'T TAKE THEM)      Encouraged to follow-up with:  PCP regarding preventative exams.      Chart initiated by      margaux Fermin            ALLERGIES/MEDICATIONS      Allergies:        Coded Allergies:             *No Known Allergies (Verified  Allergy, Mild, 1/30/18)      Medications    Last Reconciled on 1/30/18 09:13 by ROSITA GREEN DO      predniSONE* (Deltasone*) 10 Mg Tablet      10 MG PO QDAY for 10 Days, #10 TAB 0 Refills         Prov: ROSITA GREEN         1/30/18       Fluticasone Furoate (Arnuity Ellipta) 100 Mcg Blst.w.dev      1 PUFF INH RTQDAY for 30 Days, #1 INH 3 Refills         Prov: ROSITA GREEN         1/30/18       Omeprazole (Omeprazole*) 40 Mg Capsule      40 MG PO BID for 30 Days, #60 CAP 2 Refills         Prov: ROSITA GREEN         1/30/18       Albuterol/Ipratropium (Duoneb) 3 Ml Ampul.neb      3 ML INH RTQ4H for 30 Days, #180 NEB 3 Refills         Prov: Zaki Marcum         1/17/18       Rifampin (Rifampin) 300 Mg Cap      600 MG PO MOWEFR, #30 CAP         Prov: Zaki Marcum         1/15/18       Ethambutol (Ethambutol) 400 Mg Tablet      2000 MG PO MOWEFR, #60 TAB         Prov: Zaki Marcum         1/15/18       Azithromycin (Azithromycin) 250 Mg Tablet      500 MG PO MOWEFR, #24 TAB         Prov: Zaki Marcum         1/15/18       predniSONE* (predniSONE*) 20 Mg Tablet      20 MG PO QDAY, #10 TAB 0 Refills         Prov: Demetrice Chaney         1/15/18       Azithromycin (Azithromycin) 250 Mg Tablet      500 MG PO MoWeFr@0900, #30 TAB 11 Refills         Prov: LAINEY VELASQUEZ         1/11/18       NEB-Albuterol Sulf (Albuterol) 2.5 Mg/0.5 Ml Vial.neb      2.5 MG INH QID Y for DYSPNEA/WHEEZING, #120 NEB 5 Refills         Prov: LAINEY VELASQUEZ         1/11/18       Rifampin (Rifadin) 300 Mg Cap      600 MG PO MoWeFr@0700, #30 CAP 11 Refills         Prov: LAINEY VELASQUEZ          1/11/18       Ethambutol (Ethambutol) 400 Mg Tablet      2000 MG PO MoWeFr@0900, #60 TAB 11 Refills         Prov: LAINEY VELASQUEZ         1/11/18       Beclomethasone Dipropionate (QVAR 80 Mcg) 8.7 Gm Aer.w.adap      2 PUFFS INH RTBID, #1 INH         Reported         1/6/18       Umeclidinium/Vilanterol 62.5-25 Mcg Inh (Anoro Ellipta 62.5-25 Mcg Inh) 1 Each     Blst.w.dev      1 PUFF INH QDAY, #3 INH 3 Refills         Prov: Zaki Marcum         11/22/17       Aspirin (Aspirin*) 81 Mg Tab.chew      81 MG PO QAM, #30 TAB.CHEW 0 Refills         Reported         9/14/17       Furosemide* (Lasix*) 40 Mg Tablet      40 MG PO BID@09,17 Y for SWELLING, #60 TAB 0 Refills         Reported         9/14/17       Oxygen (OXYGEN)  Gas               Reported         10/19/16       Montelukast Sodium (Montelukast*) 10 Mg Tablet      10 MG PO HS, TAB         Reported         9/25/13       Lovastatin (Lovastatin) 40 Mg Tablet      40 MG PO HS         Reported         9/25/13      Current Medications      Current Medications Reviewed 1/30/18            EXAM      VITAL SIGNS:  Reviewed.  87% at on room air when ambulating into the room and     increased to 95% at rest on room air after sometime of sitting.        GENERAL: Elderly female, appears younger than stated age, in no acute distress,     speaking in full sentences.  She did cough several times throughout the exam.        NECK:  Supple without tracheal deviation or lymphadenopathy.  No thyromegaly     appreciated.      LYMPHATICS:  There is no anterior or posterior lymphadenopathy or the cervical     chain.  No supraclavicular lymphadenopathy.      HEENT: Normocephalic/atraumatic.  Pupils are equal, round and reactive to light     and accommodation.  Nares are patent without ulcerations or turbinate     hypertrophy.   Mallampati 2.  No posterior pharyngeal erythema or exudates     noted.        RESPIRATORY:  There is intermittent expiratory squeaks heard all throughout the     lung  fields, intermittent wheezing upon expiratory phase of respiration.  Good     diaphragmatic excursion.  Tympanic to percussion.        CARDIOVASCULAR:  Regular rate and rhythm.  No murmurs, gallops or rubs.  Equal     radial pulses.  There is 1+ pitting edema bilateral lower extremities.        GI: Soft, nontender, nondistended, no organomegaly.        MUSCULOSKELETAL:  No joint effusions, erythema or warmth over the major joint     systems.      SKIN:  No lesions.  There are several seborrheic keratoses noted on the face,     but no rashes.        NEUROLOGIC: Cranial nerves II-XII are intact bilaterally.  Moves all     extremities. Ambulates without assistance.  No focal deficits.        PSYCH:  Appropriate mood and affect.      Vitals      Vitals:             Height 5 ft 0 in / 152.4 cm           Weight 173 lbs 0 oz / 78.728471 kg           BSA 1.86 m2           BMI 33.8 kg/m2           Temperature 97.9 F / 36.61 C - Oral           Pulse 91           Respirations 14           Blood Pressure 157/75 Sitting, Left Arm           Pulse Oximetry 95%, roomair@rest            REVIEW      Results Reviewed      PCCS Results Reviewed?:  Yes Prev Lab Results, Yes Prev Radiology Results, Yes     [facility]      Lab Results      Reviewed her discharge summary.   Reviewed CBC from 01/07/2018, white blood     cell count was 8.              Reviewed ABG from 01/06/2018, PH 7.48, PCO2 38, PAO2 111, bicarb 27.9, this was     on 3 liters oxygen therapy.              I reviewed BAL specimen from 04/11/2017 which showed mycobacterium avium     complex.  Reviewed influenza nasopharyngeal swab from 01/06/2018 which was     negative.      Radiographic Results               [facility]                PACS RADIOLOGY REPORT            Patient: ANTONY MARTINEZ   Acct: #[id number]   Report: #[id number]            UNIT #: [id number]    DOS: 01/09/18 1555       INSURANCE:MEDICARE PART A   LOCATION:3NT  3011-01   : 1925            PROVIDERS      ADMITTING:  Demetrice Chaney   ATTENDING: Demetrice Chaney      FAMILY:  Demetrice Chaney   ORDERING:  LAINEY VELASQUEZ         OTHER:    DICTATING:  Pradeep Simons MD, IV            REQ #:[id number]   EXAM:[facility] - CT CHEST without CONTRAST      REASON FOR EXAM:  bronchiectasis      REASON FOR VISIT:  COPD EXACERBATION            *******Signed******         PROCEDURE:   CT CHEST WITHOUT CONTRAST             COMPARISON:   [facility], CT, CT CHEST W/ CONTRAST, 3/23/    2009, 9:14.        [facility], CT, CHEST W/O CONTRAST, 10/16/2012, 10:59.      [facility], CT, CHEST W/O CONTRAST, 2016, 18:58.             INDICATIONS:   bronchiectasis             PROTOCOL:     Standard imaging protocol performed                RADIATION:     DLP: 683mGy*cm          Automated exposure control was utilized to minimize radiation dose.              TECHNIQUE:   Axial images of the chest without intravenous contrast.             FINDINGS:      There is stable dilatation of the esophagus with an air-fluid level. There is     stable bronchiectasis       and scarring in the lower lobes. There is stable enlargement of the right     inferior pulmonary vein.       There is stable mild prominence of the pulmonary arteries. Mild groundglass     opacity in the mid and       upper lung fields appear stable. No pleural effusions are identified. There is     a left-sided cardiac       pacemaker. There is no evidence of adenopathy. There is a cyst in the     visualized right kidney.             IMPRESSION:             1. Stable bronchiectasis and chronic changes in the lung fields as above.             2. Stable esophageal dilatation with an air-fluid level. Suggest correlation     with any history of       scleroderma.              PRADEEP SIMONS MD             Electronically Signed and Approved By:  BERNARDO FERNANDES MD on 1/09/2018 at 19:    32                        Until signed, this is an unconfirmed preliminary report that may contain      errors and is subject to change.                                              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            Notes      New Medications      * Omeprazole (Omeprazole*) 40 MG CAPSULE: 40 MG PO BID 30 Days #60      * Fluticasone Furoate (Arnuity Ellipta) 100 MCG BLST.W.DEV: 1 PUFF INH RTQDAY     30 Days #1      * predniSONE* (Deltasone*) 10 MG TABLET: 10 MG PO QDAY 10 Days #10       Instructions: Finish the 20mg tablets you already have at home then start 10mg     every other day x 5 days then do 5mg every other day x 5 days then stop      * Neb-NaCl 3% (Sodium Chloride 3% Neb) 4 ML VIAL.NEB: 4 ML INH RTBID 30 Days #60      Discontinued Medications      * glipiZIDE (glipiZIDE*) 10 MG TABLET: 10 MG PO QDAY 30 Days #30      New Diagnostics      * Sputum Culture W/Gram Stain, Week       Dx: 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  She presents for follow up.      1.  Mycobacterium avium complex.  The patient is on ethambutol 2000 mg,     rifampin 600 mg and azithromycin 500 mg thrice weekly.  She is having some     vision changes and this can be attributed to ethambutol so I have asked her to     follow up with an ophthalmologist for a dilated eye exam. She verbalized     understanding.  We will  continue this triple antibiotic therapy and will need     AFB sputum sample collected approximately one month after starting the     antibiotics, so I have asked her to submit this sputum sample when she follows     up in one month. She will also need LFTs checked periodically. We can discuss     this at her one month follow up.      2.  COPD.  The patient is on Anoro one inhalation daily.  She was suppose to be     on Qvar twice daily, but does not have any more samples of this. I have ordered     her Arnuity one inhalation daily and prescribed this. I told her to rinse her     mouth out after use.      3.  The patient is continuing on oral steroids. I have ordered a taper over the     next two weeks time. She will complete her 20 mg tablets, then start 10 mg     tablets every other day x5 days, then 5 mg tablets every other day x5, then     stop.      4.  Bronchiectasis. The patient still has a thick sputum that is hard to     expectorate. I have ordered her hypertonic saline nebs to be used every six     hours for the first two weeks, then twice a day thereafter. 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        5.  Uncontrolled gastroesophageal reflux disease.  Start the patient on     omeprazole 40 mg twice daily.  Eventually this will be tapered down to 40 mg     once daily, but I would give her a good 6-8 week trial of the high dose. I did     offer her a referral to a gastroenterologist for possible EGD versus manometry     studies, but she declines given her age.  She said she did not want to go     through another scope test.        6.  Follow up in one months with sputum sample and will need to have an order     for LFTs to be checked that day. Continue Anoro and Arnuity.  Start nebulizers     as prescribed and vest treatment.                 Disclaimer: Converted document may not contain table formatting or lab diagrams. Please see [employer] for the authenticated document.

## 2021-05-28 NOTE — PROGRESS NOTES
Patient: ANTONY MARTINEZ     Acct: [id number]     Report: #[id number]  UNIT #: [id number]     : 1925    Encounter Date:2019  PRIMARY CARE: DAYDAY MARY DO  ***Signed***  --------------------------------------------------------------------------------------------------------------------  Chief Complaint      Encounter Date      2019            Primary Care Provider      MARGARITA HOFFMAN            Referring Provider      SELF,REFERRED            [facility]            Patient Complaint      Patient is complaining of      [facility] follow up/soa            VITALS      Height 4 ft 11 in / 149.86 cm      Weight 173 lbs 2 oz / 78.868738 kg      BSA 1.73 m2      BMI 35.0 kg/m2      Temperature 98.6 F / 37 C - Temporal      Pulse 96      Respirations 16      Blood Pressure 140/72 Sitting, Left Arm      Pulse Oximetry 88%, Room air      Initial Exhaled Nitrous Oxide      Exhaled Nitrous Oxide Results:  7            HPI      The patient presents for follow up after recent hospitalization.  She last saw     me in a clinic on 2019 and was directly admitted to the hospital for an     exacerbation of bronchiectasis.  This patient has a history of chronic AUSTEN     infection and was unable to withstand treatment secondary to side effects of the    medications.  She has improved since she was in the hospital with IV steroids     and antibiotics.  She came home and took herself off of the prednisone taper     because it was causing her to have chest tightness.  She also tells me she is     intolerant to azithromycin chronic suppression therapy as it causes chest     tightness.  She also cannot take any mucolytic as it causes chest tightness.      She is doing fairly well otherwise. She is taking Lasix 40 mg once a day.  She     is using her flutter valve with every breathing treatment.  Currently her     breathing treatments include albuterol every 4 hours, Anoro and Arnuity once     daily.             Review of  systems is positive for shortness of breath, coughing and wheezing.            Past medical, family, social and surgical history updated in the chart,     unchanged since she was seen in the hospital and the last visit.              Medications were reviewed and updated in the chart.            ROS      Constitutional:  Denies: Fatigue, Fever, Weight gain, Weight loss, Chills,     Insomnia, Other      Respiratory/Breathing:  Complains of: Shortness of air, Wheezing, Cough; Denies:    Hemoptysis, Pleuritic pain, Other      Endocrine:  Denies: Polydipsia, Polyuria, Heat/cold intolerance, Abnorml     menstrual pattern, Diabetes, Other      Eyes:  Denies: Blurred vision, Vision Changes, Other      Ears, nose, mouth, throat:  Denies: Mouth lesions, Thrush, Throat pain,     Hoarseness, Allergies/Hay Fever, Post Nasal Drip, Headaches, Recent Head Injury,    Nose Bleeding, Neck Stiffness, Thyroid Mass, Hearing Loss, Ear Fullness, Dry     Mouth, Nasal or Sinus Pain, Dry Lips, Nasal discharge, Nasal congestion, Other      Cardiovascular:  Denies: Palpitations, Syncope, Claudication, Chest Pain, Wake     up Gasping for air, Leg Swelling, Irregular Heart Rate, Cyanosis, Dyspnea on     Exertion, Other      Gastrointestinal:  Denies: Nausea, Constipation, Diarrhea, Abdominal pain,     Vomiting, Difficulty Swallowing, Reflux/Heartburn, Dysphagia, Jaundice,     Bloating, Melena, Bloody stools, Other      Genitourinary:  Denies: Urinary frequency, Incontinence, Hematuria, Urgency,     Nocturia, Dysuria, Testicular problems, Other      Musculoskeletal:  Denies: Joint Pain, Joint Stiffness, Joint Swelling, Myalgias,    Other      Hematologic/lymphatic:  DENIES: Lymphadenopathy, Bruising, Bleeding tendencies,     Other      Neurological:  Denies: Headache, Numbness, Weakness, Seizures, Other      Psychiatric:  Denies: Anxiety, Appropriate Effect, Depression, Other      Sleep:  No: Excessive daytime sleep, Morning Headache?, Snoring,  Insomnia?, Stop    breathing at sleep?, Other      Integumentary:  Denies: Rash, Dry skin, Skin Warm to Touch, Other      Immunologic/Allergic:  Denies: Latex allergy, Seasonal allergies, Asthma,     Urticaria, Eczema, Other      Immunization status:  No: Up to date            FAMILY/SOCIAL/MEDICAL HX      Surgical History:  Yes: Appendectomy, Cholecystectomy; No: AAA Repair, Abdominal    Surgery, Angioplasty, Back Surgery, Bladder Surgery, Bowel Surgery, Breast     Surgery, CABG, Carotid Stenosis, Ear Surgery, Eye Surgery, Head Surgery, Hernia     Surgery, Kidney Surgery, Nose Surgery, Oral Surgery, Orthopedic Surgery,     Prostatectomy, Rectal Surgery, Spinal Surgery, Testicular Surgery, Throat     Surgery, Valve Replacement, Vascular Surgery, Other Surgeries      Stroke - Family Hx:  Brother, Aunt      Heart - Family Hx:  Mother, Brother      Diabetes - Family Hx:  Mother, Brother, Aunt      Is Father Still Living?:  No      Is Mother Still Living?:  No       Family History:  Yes      Social History:  No Tobacco Use, No Alcohol Use, No Recreational Drug use      Smoking status:  Former smoker (2 ppd x20 years )      Hysterectomy:  Yes      Anticoagulation Therapy:  No      Antibiotic Prophylaxis:  No      Medical History:  Yes: Arthritis (GENERAL), Asthma (ASTHAMATIC BRONCHITIS),     Chronic Bronchitis/COPD, Congestive Heart Failu, Diabetes (TYPE II ), Gout,     Hiatal Hernia, High Blood Pressure, Shortness Of Breath (COPD, PNEUMONIA IN     PAST); No: Alcoholism, Allergies, Anemia, Blood Disease, Broken Bones,     Cataracts, Chemical Dependency, Chemotherapy/Cancer, Emphysema, Chronic Liver     Disease, Colon Trouble, Colitis, Diverticulitis, Deafness or Ringing Ears,     Convulsions, Depression, Anxiety, Bipolar Disorder, Epilepsy, Seizures,     Forgetfullness, Glaucoma, Gall Stones, Head Injury, Heart Attack, Heart Murmur,     Hemorrhoids/Rectal Prob (REFLUX), Hepatitis, High Cholesterol, HIV (Do not ask -     volu, Jaundice, Kidney or Bladder Disease, Kidney Stones, Migrane Headaches,     Mitral Valve Prolapse, Night sweats, Phlebitis, Psychiatric Care, Reflux     Disease, Rheumatic Fever, Sexually Transmitted Dis, Sinus Trouble, Skin     Disease/Psoriais/Ecz, Stroke, Thyroid Problem, Tuberculosis or Pos TB Te,     Miscellaneous Medical/oth      Psychiatric History      never            PREVENTION      Hx Influenza Vaccination:  Yes      Date Influenza Vaccine Given:  Nov 1, 2018      Influenza Vaccine Declined:  No      2 or More Falls Past Year?:  No      Fall Past Year with Injury?:  No      Hx Pneumococcal Vaccination:  Yes      Encouraged to follow-up with:  PCP regarding preventative exams.      Chart initiated by      dyana jimenez ma            ALLERGIES/MEDICATIONS      Allergies:        Coded Allergies:             *No Known Allergies (Verified  Allergy, Mild, 1/23/19)      Medications    Last Reconciled on 1/23/19 12:27 by ROSITA GREEN, DO      Insulin Degludec (Tresiba Flextouch U-100) 100 Unit/1 Ml Insuln.pen      8 UNITS SUBQ QDAY for 30 Days, #1 INSULN.PEN         Reported         1/2/19       (Insulin)   No Conflict Check      UNITS SUBQ QID PRN for HYPERGLYCEMIA         Reported         12/6/18       Alpha-D-Galactosidase (Beano) 1 Tab Tab      1 TAB PO TIDAC PRN for GAS, #1 BOTTLE         Reported         12/6/18       Simethicone (Gas-X) 125 Mg Tab.chew      125 MG PO QID PRN for PRN, TAB         Reported         12/6/18       Acetaminophen Es (Tylenol Extra Strength) 500 Mg Tablet      1000 MG PO Q4H PRN for PAIN, #100 TAB 0 Refills         Reported         12/6/18       glipiZIDE (glipiZIDE*) 10 Mg Tablet      10 MG PO QDAY, #30 TAB 0 Refills         Reported         12/6/18       Omeprazole (Omeprazole*) 40 Mg Capsule      40 MG PO BID, #60 CAP 0 Refills         Reported         12/6/18       Furosemide* (Lasix*) 40 Mg Tablet      40 MG PO QPM PRN for SWELLING, #90 TAB 0 Refills         Reported          12/6/18       Furosemide* (Lasix*) 40 Mg Tablet      40 MG PO QAM, #30 TAB 0 Refills         Reported         12/6/18       amLODIPine (amLODIPine) 5 Mg Tablet      5 MG PO QDAY, #30 TAB         Reported         12/6/18       (Oxygen) (OXYGEN)   No Conflict Check      2 L NS PRN PRN for SHORTNESS OF BREATH         Reported         12/6/18       Umeclidinium/Vilanterol 62.5-25 Mcg Inh (Anoro Ellipta 62.5-25 Mcg Inh) 1 Each     Blst.w.dev      1 PUFF INH QDAY, #3 INH 3 Refills         Prov: ROSITA GREEN         11/26/18       Allopurinol (Allopurinol) 100 Mg Tablet      100 MG PO BID, #30 TAB 0 Refills         Reported         10/2/18       Colchicine (Colcrys) 0.6 Mg Tab      0.6 MG PO QDAY PRN for GOUT PAIN for 30 Days, #30 TAB         Reported         10/2/18       Fluticasone Furoate (Arnuity Ellipta) 200 Mcg Blst.w.dev      1 PUFF INH RTQDAY, #1 INH 9 Refills         Prov: ROSITA GREEN         7/19/18       Neb-Levalbuterol (Xopenex) 1.25 Mg/3 Ml Vial.neb      1.25 MG INH RTQ6H, #120 ML 2 Refills         Prov: ROSITA GREEN         4/17/18       Aspirin Chew (Aspirin Chew) 81 Mg Tab.chew      81 MG PO QAM, #30 TAB.CHEW 0 Refills         Reported         9/14/17       Montelukast Sodium (Montelukast*) 10 Mg Tablet      10 MG PO HS, TAB         Reported         9/25/13       Lovastatin (Lovastatin) 40 Mg Tablet      40 MG PO HS         Reported         9/25/13      Current Medications      Current Medications Reviewed 1/23/19            EXAM      VITAL SIGNS:  Reviewed.  Of note, the patient was saturating 88% on room air.     She wears oxygen at night at home.        GENERAL: Elderly female, appears in no acute distress, she does cough somewhat     throughout the exam.        NECK:  Supple without tracheal deviation or lymphadenopathy.  No thyromegaly     appreciated.      LYMPHATICS:  No cervical or supraclavicular lymphadenopathy.      HEENT: Pupils are equal, round and reactive to light. There is no  scleral     icterus.  Nares patent without hypertrophy of the turbinates. No erythema of the    passages.  TMs are clear bilaterally with good cone of light. The posterior     pharynx is without  lesions or erythema.      RESPIRATORY:  Coarse rhonchi bilateral lung fields, expiratory wheezes as well,     kyphotic curvature to the spine.        CARDIOVASCULAR:  Regular rate and rhythm.  No murmurs, gallops or rubs.  No     lower extremity edema.  Equal radial pulses.        GI: Soft, nontender, nondistended, no organomegaly.  Bowel sounds present in all    four quadrants.      MUSCULOSKELETAL:  No joint effusions, erythema or warmth over the major joint     systems.      SKIN:  No rashes or lesions.      NEUROLOGIC: Cranial nerves II-XII are intact bilaterally.  Moves all     extremities. Ambulates with ease.      PSYCH:  Appropriate mood and affect.      Vtials      Vitals:             Height 4 ft 11 in / 149.86 cm           Weight 173 lbs 2 oz / 78.443616 kg           BSA 1.73 m2           BMI 35.0 kg/m2           Temperature 98.6 F / 37 C - Temporal           Pulse 96           Respirations 16           Blood Pressure 140/72 Sitting, Left Arm           Pulse Oximetry 88%, Room air            REVIEW      Results Reviewed      P[facility] Results Reviewed?:  Yes Prev Lab Results, Yes Prev Radiology Results, Yes     Previous [facility] Records            Assessment      Notes      New Medications      * ARFORMOTEROL TARTRATE (Brovana) 15 MCG/2 ML VIAL.NEB: 15 MCG INH RTBID #60         Instructions: dx:j44.9 [id number]:[id number]      * Neb-Budesonide (Pulmicort) 0.5 MG/2 ML AMPUL.NEB: 0.5 MG INH BID #60         Instructions: dx:j44.9 [id number]:[id number]      * NEBULIZER ACCESSORIES (Nebulizer Kit PURA) 1 EACH KIT: EACH XX ONCE #1         Instructions: Use as directed to administer nebulized medications.      Discontinued Medications      * Fluticasone Furoate (Arnuity Ellipta) 200 MCG BLST.W.DEV: 1 PUFF INH RTQDAY #1      *  Umeclidinium/Vilanterol 62.5-25 Mcg Inh (Anoro Ellipta 62.5-25 Mcg Inh) 1 EACH      BLST.W.DEV: 1 PUFF INH QDAY #3      * predniSONE* 20 MG TABLET: 20 MG PO QDAY #5      ASSESSMENT:       1.  Bronchiectasis with recent acute exacerbation requiring hospitalization.      2.  COPD/asthma overlap syndrome.      3.  Chronic hypoxemic respiratory failure on nocturnal oxygen.      4.  History of AUSTEN infection, failed triple antibiotic therapy secondary to side    effects.      5.  Severe gastroesophageal reflux disease without esophagitis.      6.  CKD stage 3.      7.  Neuropathy of the lower extremities.      8.  Seasonal allergic rhinitis.            PLAN:        1.  The patient has recently had a severe exacerbation requiring hospitaliza    tion.  I will stop Anoro and Arnuity and call in Brovana and Pulmicort twice     daily through [facility]. She will continue albuterol as needed.        2.  Consider supplemental oxygen.  Consider wearing it throughout the day.        3.  Continue flutter valve per airway clearance.        4. Continue Lasix 40 mg once daily.      5. Follow up in two months.            Patient Education      Patient Education Provided:  How to use a Nebulizer      Time Spent:  > 50% /[facility]      Other Education:  bronchiectasis                 Disclaimer: Converted document may not contain table formatting or lab diagrams. Please see [employer] for the authenticated document.

## 2021-05-28 NOTE — PROGRESS NOTES
Patient: ANTONY MARTINEZ     Acct: [id number]     Report: #[id number]  UNIT #: [id number]     : 1925    Encounter Date:2019  PRIMARY CARE: DAYDAY MARY DO  ***Signed***  --------------------------------------------------------------------------------------------------------------------  Chief Complaint      Encounter Date      2019            Primary Care Provider      MARGARITA HOFFMAN            Referring Provider      SELF,REFERRED            [facility]            Patient Complaint      Patient is complaining of      Patient here today for 3 month follow up, COPD            VITALS      Height 59 in / 149.86 cm      Weight 162 lbs  / 73.302082 kg      BSA 1.69 m2      BMI 32.7 kg/m2      Temperature 97.5 F / 36.39 C - Temporal      Pulse 78      Respirations 13      Blood Pressure 126/60 Sitting, Right Arm      Pulse Oximetry 91%, room air      Initial Exhaled Nitrous Oxide      Exhaled Nitrous Oxide Results:  7            HPI      The patient is a 89 year old  female with a history of hypoxic     respiratory failure on 2 liters of oxygen at all times. She has a history of     mycobacterium avium intracellular infection but was non-tolerant to multiple     antibiotics. She also has chronic productive cough secondary to bronchiectasis.     She did not tolerate vest treatment secondary to severe thoracic pain. She is     able to expectorate all her sputum with deep coughing techniques. She is on     anoro once daily and since her last office visit she was approved for [email or web address]     portable oxygen concentrator. Today she has no specific complaints but she does     ask that I call her in a Z-pack and some prednisone secondary to her fearfulness    of coming down with chest congestion or cold on a weekend and not being able to     see a doctor and she is afraid she would end up in the hospital. She has not     been hospitalized in the past 4 months and is doing well. She did have a      pacemaker battery replaced by Dr. Melo and complains of some associated     scar pain in that area.             Review of Systems as noted.            Past medical, family, social and surgical history reviewed and I agree with that    as documented in the medical chart.            Medications were reviewed and updated in the chart.            ROS      Constitutional:  Denies: Fatigue, Fever, Weight gain, Weight loss, Chills,     Insomnia, Other      Respiratory/Breathing:  Complains of: Shortness of air; Denies: Wheezing, Cough,    Hemoptysis, Pleuritic pain, Other      Endocrine:  Denies: Polydipsia, Polyuria, Heat/cold intolerance, Abnorml     menstrual pattern, Diabetes, Other      Eyes:  Denies: Blurred vision, Vision Changes, Other      Ears, nose, mouth, throat:  Denies: Congestion, Dysphagia, Hearing Changes, Nose    Bleeding, Nasal Discharge, Throat pain, Tinnitus, Other      Cardiovascular:  Denies: Chest Pain, Exertional dyspnea, Peripheral Edema,     Palpitations, Syncope, Wake up Gasping for air, Orthopnea, Tachycardia, Other      Gastrointestinal:  Denies: Abdominal pain/cramping, Bloody stools, Constipation,    Diarrhea, Melena, Nausea, Vomiting, Other      Genitourinary:  Denies: Dysuria, Urinary frequency, Incontinence, Hematuria,     Urgency, Other      Musculoskeletal:  Denies: Joint Pain, Joint Stiffness, Joint Swelling, Myalgias,    Other      Hematologic/lymphatic:  DENIES: Lymphadenopathy, Bruising, Bleeding tendencies,     Other      Neurologic:  Denies: Headache, Numbness, Weakness, Seizures, Other      Psychiatric:  Denies: Anxiety, Appropriate Effect, Depression, Other      Sleep:  No: Excessive daytime sleep, Morning Headache?, Snoring, Insomnia?, Stop    breathing at sleep?, Other      Integumentary:  Denies: Rash, Dry skin, Skin Warm to Touch, Other            FAMILY/SOCIAL/MEDICAL HX      Surgical History:  Yes: Appendectomy, Cholecystectomy; No: AAA Repair, Abdominal     Surgery, Angioplasty, Back Surgery, Bladder Surgery, Bowel Surgery, Breast     Surgery, CABG, Carotid Stenosis, Ear Surgery, Eye Surgery, Head Surgery, Hernia     Surgery, Kidney Surgery, Nose Surgery, Oral Surgery, Orthopedic Surgery,     Prostatectomy, Rectal Surgery, Spinal Surgery, Testicular Surgery, Throat     Surgery, Valve Replacement, Vascular Surgery, Other Surgeries      Stroke - Family Hx:  Brother, Aunt      Heart - Family Hx:  Mother, Brother      Diabetes - Family Hx:  Mother, Brother, Aunt      Is Father Still Living?:  No      Is Mother Still Living?:  No       Family History:  Yes      Social History:  No Tobacco Use, No Alcohol Use, No Recreational Drug use      Smoking status:  Former smoker (2 ppd x20 years )      Hysterectomy:  Yes      Anticoagulation Therapy:  No      Antibiotic Prophylaxis:  No      Medical History:  Yes: Arthritis (GENERAL), Asthma (ASTHAMATIC BRONCHITIS),     Chronic Bronchitis/COPD, Congestive Heart Failu, Diabetes (TYPE II ), Gout,     Hiatal Hernia, High Blood Pressure, Shortness Of Breath (COPD, PNEUMONIA); No:     Alcoholism, Allergies, Anemia, Blood Disease, Broken Bones, Cataracts, Chemical     Dependency, Chemotherapy/Cancer, Emphysema, Chronic Liver Disease, Colon     Trouble, Colitis, Diverticulitis, Deafness or Ringing Ears, Convulsions,     Depression, Anxiety, Bipolar Disorder, Epilepsy, Seizures, Forgetfullness,     Glaucoma, Gall Stones, Head Injury, Heart Attack, Heart Murmur,     Hemorrhoids/Rectal Prob (REFLUX), Hepatitis, High Cholesterol, HIV (Do not ask -    volu, Jaundice, Kidney or Bladder Disease, Kidney Stones, Migrane Headaches,     Mitral Valve Prolapse, Night sweats, Phlebitis, Psychiatric Care, Reflux Diseas    e, Rheumatic Fever, Sexually Transmitted Dis, Sinus Trouble, Skin     Disease/Psoriais/Ecz, Stroke, Thyroid Problem, Tuberculosis or Pos TB Te,     Miscellaneous Medical/oth      Psychiatric History      none            PREVENTION       Hx Influenza Vaccination:  Yes      Date Influenza Vaccine Given:  Nov 1, 2018      Influenza Vaccine Declined:  No      2 or More Falls Past Year?:  No      Fall Past Year with Injury?:  No      Hx Pneumococcal Vaccination:  Yes      Encouraged to follow-up with:  PCP regarding preventative exams.      Chart initiated by      Cecy Eason MA            ALLERGIES/MEDICATIONS      Allergies:        Coded Allergies:             *No Known Allergies (Verified  Allergy, Mild, 6/12/19)      Medications    Last Reconciled on 6/12/19 12:37 by ROSITA GREEN,       Umeclidinium/Vilanterol 62.5-25 Mcg Inh (Anoro Ellipta 62.5-25 Mcg Inh) 1 Each     Blst.w.dev      1 PUFF INH QDAY, #1 INH 0 Refills         Prov: ROSITA GREEN         6/12/19       predniSONE* (Deltasone*) 10 Mg Tablet      10 MG PO ASDIR, #45 TAB 0 Refills         Prov: ROSITA GREEN         6/12/19       Azithromycin (Azithromycin) 250 Mg Tablet      250 MG PO ASDIR, #6 TAB         Prov: ROSITA GREEN         6/12/19       Irbesartan (Irbesartan) 75 Mg Tablet      75 MG PO QDAY for 30 Days, #30 TAB         Reported         6/12/19       Insulin Degludec (Tresiba Flextouch U-200) 200 Unit/1 Ml Insuln.pen      8 UNITS SUBQ QDAY for 30 Days, #1 INSULN.PEN         Reported         6/12/19       Neb-Budesonide (Budesonide) 0.5 Mg/2 Ml Ampul.neb      0.5 MG INH BID for 60 Days, #60 NEB 11 Refills         Prov: ROSITA GREEN         4/25/19       Arformoterol Tartrate (Brovana) 15 Mcg/2 Ml Vial.neb      15 MCG INH BID for 60 Days, #60 NEB 11 Refills         Prov: ROSITA GREEN         4/25/19       (tribecka)   No Conflict Check               Reported         3/27/19       amLODIPine (amLODIPine) 2.5 Mg Tablet      2.5 MG PO QDAY, #30 TAB 0 Refills         Prov: EL-Shiekh,Reda         2/9/19       Furosemide* (Lasix*) 40 Mg Tablet      40 MG PO BID@09,17, #60 TAB 0 Refills         Prov: EL-JayleneekhReda         2/9/19       Insulin Lispro (HumaLOG VIAL*) 100 Units/Ml  Vial      0 SUBQ QID INSULIN, #1 VIAL 0 Refills         Reported         2/6/19       Nebulizer Accessories (Nebulizer Kit PURA) 1 Each Kit      EACH XX ONCE, #1 0 Refills         Prov: ROSITA GREEN         1/25/19       Acetaminophen Es (Tylenol Extra Strength) 500 Mg Tablet      1000 MG PO Q4H PRN for PAIN, #100 TAB 0 Refills         Reported         12/6/18       (Oxygen) (OXYGEN)   No Conflict Check      2 L NS PRN PRN for SHORTNESS OF BREATH         Reported         12/6/18       Allopurinol (Allopurinol) 100 Mg Tablet      100 MG PO BID, #30 TAB 0 Refills         Reported         10/2/18       Neb-Levalbuterol (Xopenex) 1.25 Mg/3 Ml Vial.neb      1.25 MG INH RTQ6H, #120 ML 2 Refills         Prov: ROSITA GREEN         4/17/18       Aspirin Chew (Aspirin Chew) 81 Mg Tab.chew      81 MG PO QAM, #30 TAB.CHEW 0 Refills         Reported         9/14/17       Montelukast Sodium (Montelukast*) 10 Mg Tablet      10 MG PO HS, TAB         Reported         9/25/13      Current Medications      Current Medications Reviewed 6/12/19            EXAM      VITAL SIGNS:  Reviewed.        NECK:  Supple without tracheal deviation or lymphadenopathy.  No thyromegaly ap    preciated.      LYMPHATICS:  No cervical or supraclavicular lymphadenopathy.      HEENT: Pupils are equal, round and reactive to light. There is no scleral     icterus.  Nares patent without hypertrophy of the turbinates. No erythema of the     passages.  TMs are clear bilaterally with good cone of light. The posterior     pharynx is without  lesions or erythema.      RESPIRATORY: Coarse rhonchi that clears with coughing in the bilateral lower     lobes, kyphotic curvature to the thoracic spine.   No wheezes or rales.      Tympanic to percussion.        CARDIOVASCULAR:  Regular rate and rhythm.  No murmurs, gallops or rubs.  No     lower extremity edema.  Equal radial pulses.        GI: Soft, nontender, nondistended, no organomegaly.  Bowel sounds present in all      four quadrants.      MUSCULOSKELETAL:  No joint effusions, erythema or warmth over the major joint     systems. She ambulates with a walker.       SKIN:  No rashes or lesions.      NEUROLOGIC: Cranial nerves II-XII are intact bilaterally.  Moves all     extremities. Ambulates with ease.      PSYCH:  Appropriate mood and affect.      Vitals      Vitals:             Height 59 in / 149.86 cm           Weight 162 lbs  / 73.149700 kg           BSA 1.69 m2           BMI 32.7 kg/m2           Temperature 97.5 F / 36.39 C - Temporal           Pulse 78           Respirations 13           Blood Pressure 126/60 Sitting, Right Arm           Pulse Oximetry 91%, room air            REVIEW      Results Reviewed      PCCS Results Reviewed?:  Yes Previous [facility] Records            Assessment      Notes      New Medications      * Insulin Degludec (Tresiba Flextouch U-200) 200 UNIT/1 ML INSULN.PEN: 8 UNITS       SUBQ QDAY 30 Days #1      * Irbesartan 75 MG TABLET: 75 MG PO QDAY 30 Days #30      * Azithromycin 250 MG TABLET: 250 MG PO ASDIR #6         Instructions: Take 500 mg (two tablets) on the first day, then 250 mg (one        tablet) once a day until all gone.      * predniSONE* (Deltasone*) 10 MG TABLET: 10 MG PO ASDIR #45         Instructions: [id number],47ejp5q,52taz6q,52awt2n,72vkn0h      * Umeclidinium/Vilanterol 62.5-25 Mcg Inh (Anoro Ellipta 62.5-25 Mcg Inh) 1 EACH       BLST.W.DEV: 1 PUFF INH QDAY #1      ASSESSMENT:       1.  Bronchiectasis without acute exacerbation.        2.  Severe COPD/asthma overlap syndrome.      3.  Chronic hypoxemic respiratory failure on supplemental oxygen.        4.  History of AUSTNE infection, failed triple antibiotic therapy secondary to side     effects.      5.  Severe gastroesophageal reflux disease without esophagitis.      6.  CKD stage 3.      7.  Sick sinus syndrome, status post pacemaker placement.       8. Chronic diastolic congestive heart failure without acute exacerbation.        9. Neuropathy of the lower extremities.       10. Seasonal allergic rhinitis.             PLAN:        1.  Continue anoro. I have refilled this today.  Continue flutter valve it     needed for additional airway clearance techniques.       2. I have encouraged the patient to wear her supplemental oxygen at all times.     She did not bring her Inogen with her today and her oxygen saturation was 91% at     rest. The patient verbalized understanding.       3. I called in a prednisone pack and a Z-pack at her request to have on hand in     the event that she comes down with an exacerbation of her chronic obstructive     pulmonary disease.       4. I will follow up with her in 6 months.            Patient Education      Patient Education Provided:  Acute Bronchitis, COPD, How to use an Inhaler      Time Spent:  > 50% /Coord [facility]                 Disclaimer: Converted document may not contain table formatting or lab diagrams. Please see [employer] for the authenticated document.

## 2021-05-28 NOTE — PROGRESS NOTES
Patient: ANTONY MARTINEZ     Acct: [id number]     Report: #[id number]  UNIT #: [id number]     : 1925    Encounter Date:2020  PRIMARY CARE: DAYDAY MARY DO  ***Signed***  --------------------------------------------------------------------------------------------------------------------  Chief Complaint      Encounter Date      Sep 9, 2020            Primary Care Provider      DAYDAY MARY DO            Referring Provider            [facility]            Patient Complaint      Patient is complaining of      Pt here for f/u, copd            VITALS      Height 4 ft 10 in / 147.32 cm      Weight 139 lbs 8 oz / 63.42386 kg      BSA 1.56 m2      BMI 29.2 kg/m2      Temperature 98.7 F / 37.06 C - Temporal      Pulse 78      Respirations 16      Blood Pressure 124/82 Sitting, Left Arm      Pulse Oximetry 88%, Room air      Comment: Sitting room air 02 dropped to 74%            HPI      The patient is a 89 year old  female well known to me, she has a    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 She also has chronic hypoxemic respiratory failure.  She     has gone downhill over the past 6 months in terms of her health, recurrent     hospitalizations. She was on dialysis for a short time. She states she is now     off dialysis with full kidney recovery. She is followed by Dr. Chaney. She has     history of congestive heart failure and is followed by Dr. Mcfarlane. Today the     patient states she is not doing well, she is coughing more, having very thick     phlegm that is difficult to expectorate. She has oxygen but does not wear it     very often secondary to nose bleeds. She does not have humidification on her     oxygen. She is more fatigued recently, she says she wakes up at night coughing     and choking and gagging on her phlegm. She will sit on the edge of the bed and     cough quite a bit to bring it up. She has poor appetite and  says she does not     have the will to eat. But does not know if she has lost weight.            ROS      Constitutional:  Complains of: Fatigue, Weight loss; Denies: Fever, Weight gain,    Chills, Insomnia, Other      Respiratory/Breathing:  Complains of: Shortness of air, Wheezing, Cough; Denies:    Hemoptysis, Pleuritic pain, Other      Endocrine:  Denies: Polydipsia, Polyuria, Heat/cold intolerance, Abnorml     menstrual pattern, Diabetes, Other      Eyes:  Denies: Blurred vision, Vision Changes, Other      Ears, nose, mouth, throat:  Denies: Congestion, Dysphagia, Hearing Changes, Nose    Bleeding, Nasal Discharge, Throat pain, Tinnitus, Other      Cardiovascular:  Complains of: Wake up Gasping for air; Denies: Chest Pain,     Exertional dyspnea, Peripheral Edema, Palpitations, Syncope, Orthopnea,     Tachycardia, Other      Gastrointestinal:  Denies: Abdominal pain/cramping, Bloody stools, Constipation,    Diarrhea, Melena, Nausea, Vomiting, Other      Genitourinary:  Denies: Dysuria, Urinary frequency, Incontinence, Hematuria,     Urgency, Other      Musculoskeletal:  Denies: Joint Pain, Joint Stiffness, Joint Swelling, Myalgias,    Other      Hematologic/lymphatic:  DENIES: Lymphadenopathy, Bruising, Bleeding tendencies,     Other      Neurologic:  Denies: Headache, Numbness, Weakness, Seizures, Other      Psychiatric:  Denies: Anxiety, Appropriate Effect, Depression, Other      Sleep:  No: Excessive daytime sleep, Morning Headache?, Snoring, Insomnia?, Stop    breathing at sleep?, Other      Integumentary:  Denies: Rash, Dry skin, Skin Warm to Touch, Other            FAMILY/SOCIAL/MEDICAL HX      Surgical History:  Yes: Appendectomy, Cholecystectomy; No: AAA Repair, Abdominal    Surgery, Angioplasty, Back Surgery, Bladder Surgery, Bowel Surgery, Breast     Surgery, CABG, Carotid Stenosis, Ear Surgery, Eye Surgery, Head Surgery, Hernia     Surgery, Kidney Surgery, Nose Surgery, Oral Surgery, Orthopedic  Surgery,     Prostatectomy, Rectal Surgery, Spinal Surgery, Testicular Surgery, Throat     Surgery, Valve Replacement, Vascular Surgery, Other Surgeries      Stroke - Family Hx:  Brother, Aunt      Heart - Family Hx:  Mother, Brother      Diabetes - Family Hx:  Mother, Brother, Aunt      Is Father Still Living?:  No      Is Mother Still Living?:  No      Social History:  No Tobacco Use, No Alcohol Use, No Recreational Drug use      Smoking status:  Former smoker (18yo, 1.5 ppd, quit 1983)      Hysterectomy:  Yes      Anticoagulation Therapy:  No      Antibiotic Prophylaxis:  No      Medical History:  Yes: Arthritis (GENERAL), Asthma (ASTHAMATIC BRONCHITIS),     Chronic Bronchitis/COPD, Congestive Heart Failu, Depression, Diabetes (TYPE II     ), Gout, Hiatal Hernia, High Blood Pressure, Kidney or Bladder Disease,     Shortness Of Breath (COPD, PNEUMONIA); No: Anemia, Blood Disease, Broken Bones,     Cataracts, Chemical Dependency, Chemotherapy/Cancer, Emphysema, Chronic Liver     Disease, Colon Trouble, Colitis, Diverticulitis, Deafness or Ringing Ears,     Anxiety, Bipolar Disorder, Epilepsy, Seizures, Glaucoma, Gall Stones, Head     Injury, Heart Attack, Heart Murmur, Hemorrhoids/Rectal Prob (REFLUX), Hepatitis,    HIV (Do not ask - volu, Kidney Stones, Migrane Headaches, Mitral Valve Prolapse,    Psychiatric Care, Reflux Disease, Rheumatic Fever, Sexually Transmitted Dis,     Sinus Trouble, Skin Disease/Psoriais/Ecz, Stroke, Thyroid Problem, Tuberculosis     or Pos TB Te, Miscellaneous Medical/oth      Psychiatric History      None            PREVENTION      Hx Influenza Vaccination:  Yes      Date Influenza Vaccine Given:  Oct 1, 2019      Influenza Vaccine Declined:  No      2 or More Falls in Past Year?:  No      Fall Past Year with Injury?:  No      Hx Pneumococcal Vaccination:  Yes      Encouraged to follow-up with:  PCP regarding preventative exams.      Chart initiated by      Cecy Eason MA             ALLERGIES/MEDICATIONS      Allergies:        Coded Allergies:             *No Known Allergies (Verified  Allergy, Mild, 9/9/20)      Medications    Last Reconciled on 7/15/20 12:47 by ROSITA GREEN DO      Mirtazapine (Mirtazapine*) 7.5 Mg Tablet      7.5 MG PO HS, #30 TAB 0 Refills         Reported         9/9/20       Umeclidinium/Vilanterol 62.5-25 Mcg Inh (Anoro Ellipta 62.5-25 Mcg Inh) 1 Each     Blst.w.dev      1 PUFF INH QDAY for 90 Days, #3 INH 6 Refills         Prov: ROSITA GREEN         7/15/20       Spacer (Spacer) 1 Each Each      EACH XX ONCE, #1 0 Refills         Prov: ROSITA GREEN         7/15/20       NEB-Levalbuterol (LEVALBUTEROL HCL) 1.25 Mg/3 Ml Vial.neb      1.25 MG INH RTQ4H PRN for SHORTNESS OF BREATH, #180 NEB         Reported         7/15/20       Pantoprazole (Protonix) 40 Mg Tablet.dr      40 MG PO BIDAC, #60 TAB 0 Refills         Reported         7/15/20       Carvedilol (Carvedilol) 6.25 Mg Tablet      12.5 MG PO BID, #120 TAB 0 Refills         Reported         7/15/20       dilTIAZem LA (Cardizem LA) 240 Mg Tab.er.24h      240 MG PO QDAY for 30 Days, #30 TAB.ER.24H         Reported         4/15/20       Insulin Degludec (Tresiba Flextouch U-200) 200 Unit/1 Ml Insuln.pen      8 UNITS SUBQ QDAY for 30 Days, #1 INSULN.PEN         Reported         6/12/19       Furosemide* (Lasix*) 40 Mg Tablet      40 MG PO BID@09,17, #60 TAB 0 Refills         Prov: Sumit Moreno         2/9/19       Insulin Lispro (HumaLOG VIAL) 100 Units/Ml Vial      0 SUBQ QID INSULIN, #1 VIAL 0 Refills         Reported         2/6/19       (Oxygen) (OXYGEN)   No Conflict Check      2 L NS PRN PRN for SHORTNESS OF BREATH         Reported         12/6/18       Allopurinol (Allopurinol) 100 Mg Tablet      100 MG PO BID, #30 TAB 0 Refills         Reported         10/2/18       Aspirin Chew (Aspirin Chew) 81 Mg Tab.chew      81 MG PO QAM, #30 TAB.CHEW 0 Refills         Reported         9/14/17       Montelukast Sodium  (Montelukast*) 10 Mg Tablet      10 MG PO HS, TAB         Reported         9/25/13      Current Medications      Current Medications Reviewed 9/9/20            EXAM      VITAL SIGNS:  Reviewed.  When the patient walked into the room today without     oxygen on her oxygen saturation were recorded at 74%. We let her sit in the     office and rest for a while and her oxygen saturation came up to  88% on room     air. She did not have her oxygen machine with her today.        GENERAL:       NECK:  Supple without tracheal deviation or lymphadenopathy.  No thyromegaly     appreciated.      LYMPHATICS:  No cervical or supraclavicular lymphadenopathy.      HEENT: Pupils are equal, round and reactive to light. There is no scleral     icterus.  Nares patent without hypertrophy of the turbinates. No erythema of the     passages.  TMs are clear bilaterally with good cone of light. The posterior     pharynx is without  lesions or erythema.      RESPIRATORY: Very poor aeration, rhonchi all throughout the chest, audible     wheezing.  No  rales.  Tympanic to percussion.        CARDIOVASCULAR: Distant heart tones, regular rate and rhythm.  Systolic murmur     appreciated, no gallops or rubs. Displaced PMI. Mild peripheral edema.  Equal     radial pulses.        GI: Soft, nontender, nondistended, no organomegaly.  Bowel sounds present in all     four quadrants.      MUSCULOSKELETAL:  No joint effusions, erythema or warmth over the major joint     systems.      SKIN:  No rashes or lesions.      NEUROLOGIC: Cranial nerves II-XII are intact bilaterally.  Moves all     extremities. Ambulates with ease.      PSYCH:  Appropriate mood and affect.      Vitals      Vitals:             Height 4 ft 10 in / 147.32 cm           Weight 139 lbs 8 oz / 63.60780 kg           BSA 1.56 m2           BMI 29.2 kg/m2           Temperature 98.7 F / 37.06 C - Temporal           Pulse 78           Respirations 16           Blood Pressure 124/82 Sitting, Left  Arm           Pulse Oximetry 88%, Room air           Comment: Sitting room air 02 dropped to 74%            REVIEW      Results Reviewed      PCCS Results Reviewed?:  Yes Prev Radiology Results, Yes Previous [facility] Records      Lab Results      I personally reviewed her last office note.      Radiographic Results      I personally reviewed chest x-ray from May 2020 showing worsening congestive     heart failure, airspace consolidation in the lung bases.            Assessment      Bronchiectasis with acute exacerbation - J47.1            Notes      New Medications      * Mirtazapine (Mirtazapine*) 7.5 MG TABLET: 7.5 MG PO HS #30         Instructions: Take half      * REVEFENACIN (YUPELRI) 175 MCG/3 ML NEBU: 175 MCG INH RTQDAY 30 Days #30      * Formoterol Fumarate (Perforomist) 20 MCG/2 ML VIAL.NEB: 20 MCG INH BID 30 Days       #120      * Neb-Budesonide (Pulmicort) 0.5 MG/2 ML AMPUL.NEB: 0.5 MG INH RTBID 30 Days #60         Instructions: DIAGNOSIS CODE REQUIRED PRIOR TO PRESCRIBING.      * Neb-NaCl 3% (Sodium Chloride 3% Neb) 4 ML VIAL.NEB: 4 ML INH RTBID 30 Days #60      * predniSONE (Deltasone) 10 MG TABLET: 10 MG PO ASDIR #45         Instructions: 72pch3f,23mqm3h,99flk2r,13ndq5m,42zrc6m      * AMOXICILLIN/CLAVULANATE K (Augmentin 875/125 Mg) 1 EACH TABLET: 875 MG PO BID       7 Days #14      * REVEFENACIN (YUPELRI) 175 MCG/3 ML NEBU          Sample - Qty 2      * Formoterol Fumarate (Perforomist) 20 MCG/2 ML VIAL.NEB          Sample - Qty 2      Discontinued Medications      * Potassium Chloride (K-Dur*) 10 MEQ TAB.ER.PRT: 10 MEQ PO once daily 30 Days       #30      * Umeclidinium/Vilanterol 62.5-25 Mcg Inh (Anoro Ellipta 62.5-25 Mcg Inh) 1 EACH       BLST.W.DEV: 1 PUFF INH QDAY 90 Days #3      New Diagnostics      * Provide Nebulizer Education, Routine      New Office Procedures      * Follow Up Appointment, 2 Months         Dx: Bronchiectasis with acute exacerbation - J47.1      * Provide Nebulizer Education,  Routine      * Solu-Medrol 125 MG, As Soon As Possible         methylPREDNISolone SODIUM SUCC (SOLU-medrol) 125 MG VIAL: 125 MILLIGRAM        INTRAMUSC Qty 1 VIAL         Dx: Bronchiectasis with acute exacerbation - J47.1      ASSESSMENT:      1. Acute exacerbation of bronchiectasis.       2. History of chronic mycobacterium avium disease unable to tolerate triple     antibiotics therapy.       3. Difficulty with airway clearance.       4. Acute bronchitis.       5. Unintentional weight loss.       6. History of end stage renal disease recently on dialysis now off dialysis     followed by Dr. Chaney.       7. Acute on chronic hypoxic respiratory failure.             PLAN:      1. I counseled the patient today for approximately 5 minutes and told her start     wearing her oxygen 24/7 as she is supposed to, she knows she is supposed to, she     even has a portable oxygen concentrator. She says she does not really want, she     is just tired of it all. She is also concerned about her nose bleeds. She is on     aspirin 81 mg daily and she needs to stop this at this point. She is 89 years     of age and the risks of continued aspirin with secondary to nose bleeds and her     not wearing her oxygen outweigh the benefits of the low dose aspirin at this p    oint.The patient verbalized understanding and says she will stop her aspirin.      2. For her acute bronchitis and bronchiectasis exacerbation, I have called in     steroids and given her a Solu Medrol injection of 125 mg in the office today.       3. I will stop anoro and call in Perforomist  and budesonide twice daily to [facility] as well as Yupelri once daily. We taught her how to appropriately use     this in the office today. She already has some levalbuterol nebulizer at home to     use as needed. I did  her to start using them to try to promote     increased coughing. She has a flutter valve at home that she needs to use 3-4     times a day.        4. I have called in Augmentin for 7 days to help with potential secondary     bacterial infection.       5. Follow up with Dr. Chaney and Dr. Mcfarlane for her respective kidney disease     and heart failure.       6. I did not get a chest x-ray today secondary to the fact it would not change     my management at this point.       7. I would like to see her back in short term follow up in 1 month. If I am out     of the office she may do a Telehealth visit with my PA or NP. I am very     concerned about this patient, she has gone downhill in the past 6 months and I     fear she will not live more than another 6 months to 1 year if she continues to     have weight loss and noncompliant with oxygen. This was discussed with her in     detail.            Patient Education      Education resources provided:  Yes      Patient Education Provided:  Acute Asthma, Acute Bronchitis, Bronchoscopy, How     to use an Inhaler, How to use a Nebulizer      Time Spent:  > 50% /[facility]            Electronically signed by ROSITA GREEN  09/14/2020 06:44       Disclaimer: Converted document may not contain table formatting or lab diagrams. Please see [employer] [facility] for the authenticated document.

## 2021-05-28 NOTE — PROGRESS NOTES
Patient: ANTONY MARTINEZ     Acct: [id number]     Report: #[id number]  UNIT #: [id number]     : 1925    Encounter Date:2018  PRIMARY CARE: DAYDAY MARY DO  ***Signed***  --------------------------------------------------------------------------------------------------------------------  Chief Complaint      Encounter Date      2018            Primary Care Provider      MARGARITA HOFFMAN            Referring Provider      SELF,REFERRED PATIENT            [facility]            Patient Complaint      Patient is complaining of      Pt is here for COPD            VITALS      Height 5 ft 0 in / 152.4 cm      Weight 173 lbs 4 oz / 78.470858 kg      BSA 1.86 m2      BMI 33.8 kg/m2      Temperature 98.2 F / 36.78 C - Oral      Pulse 77      Respirations 16      Blood Pressure 156/80 Sitting, Left Arm      Pulse Oximetry 93%, roomair@rest      Exhaled Nitrous Oxide Testin            HPI      The patient is here for follow up of COPD.  She has a history of chronic     mycobacterium avium complex for which she had been started on rifampin,     azithromycin and ethambutol by my partner, Dr. Lynch during hospitalization.      She comes in today telling me that she is having more side effects and     difficulties with these medications than it is worth to her. She has developed     peripheral neuropathy, dizziness and upset stomach. She feels her eye sight has     went downhill. She would like to discontinue the medications, but wanted to     discuss it with me first. In regards to her breathing, she has good days and     bad days. She complains of a thick sputum that is sometimes difficult to     expectorate, but when she uses her airway clearance this helps.  She is taking     arnuity 100 mcg once daily.  She treats gastroesophageal reflux disease with     omeprazole and uses DuoNebs approximately 3-4 times per day.  Her allergies are     treated with montelukast and she feels that they are under good  control.  She     is having poor appetite and GI upset, but has not lost any weight to her     knowledge.            Review of systems as noted.            Medications were reviewed and updated in the chart.            ROS      Constitutional:  Denies: Fatigue, Fever, Weight gain, Weight loss, Chills,     Insomnia, Other      Respiratory/Breathing:  Complains of: Cough, Denies: Shortness of air, Wheezing    , Hemoptysis, Pleuritic pain, Other      Endocrine:  Denies: Polydipsia, Polyuria, Heat/cold intolerance, Abnorml     menstrual pattern, Diabetes, Other      Eyes:  Denies: Blurred vision, Vision Changes, Other      Ears, nose, mouth, throat:  Denies: Congestion, Dysphagia, Hearing Changes,     Nose Bleeding, Nasal Discharge, Throat pain, Tinnitus, Other      Cardiovascular:  Denies: Chest Pain, Exertional dyspnea, Peripheral Edema,     Palpitations, Syncope, Wake up Gasping for air, Orthopnea, Tachycardia, Other      Gastrointestinal:  Denies: Abdominal pain/cramping, Bloody stools, Constipation    , Diarrhea, Melena, Nausea, Vomiting, Other      Genitourinary:  Denies: Dysuria, Urinary frequency, Incontinence, Hematuria,     Urgency, Other      Musculoskeletal:  Denies: Joint Pain, Joint Stiffness, Joint Swelling, Myalgias    , Other      Hematologic/lymphatic:  DENIES: Lymphadenopathy, Bruising, Bleeding tendencies,     Other      Neurologic:  Denies: Headache, Numbness, Weakness, Seizures, Other      Psychiatric:  Denies: Anxiety, Appropriate Effect, Depression, Other      Sleep:  No: Excessive daytime sleep, Morning Headache?, Snoring, Insomnia?,     Stop breathing at sleep?, Other      Integumentary:  Denies: Rash, Dry skin, Skin Warm to Touch, Other            FAMILY/SOCIAL/MEDICAL HX      Surgical History:  Yes: Appendectomy, Cholecystectomy, No: AAA Repair,     Abdominal Surgery, Angioplasty, Back Surgery, Bladder Surgery, Bowel Surgery,     Breast Surgery, CABG, Carotid Stenosis, Ear Surgery, Eye  Surgery, Head Surgery,     Hernia Surgery, Kidney Surgery, Nose Surgery, Oral Surgery, Orthopedic Surgery,     Prostatectomy, Rectal Surgery, Spinal Surgery, Testicular Surgery, Throat     Surgery, Valve Replacement, Vascular Surgery, Other Surgeries      Stroke - Family Hx:  Brother, Aunt      Heart - Family Hx:  Mother, Brother      Diabetes - Family Hx:  Mother, Brother, Aunt      Is Father Still Living?:  No      Is Mother Still Living?:  No      Social History:  No Tobacco Use, No Alcohol Use, No Recreational Drug use      Smoking status:  Former smoker (2 ppd x20 years )      Hysterectomy:  Yes      Anticoagulation Therapy:  No      Antibiotic Prophylaxis:  No      Medical History:  Yes: Arthritis, Asthma (ASTHAMATIC BRONCHITIS), Chronic     Bronchitis/COPD, Congestive Heart Failu, Diabetes (TYPE II ), Gout, Hiatal     Hernia, High Blood Pressure, Shortness Of Breath (COPD, PNEUMONIA IN PAST), No:     Alcoholism, Allergies, Anemia, Blood Disease, Broken Bones, Cataracts, Chemical     Dependency, Chemotherapy/Cancer, Emphysema, Chronic Liver Disease, Colon Trouble    , Colitis, Diverticulitis, Deafness or Ringing Ears, Convulsions, Depression,     Anxiety, Bipolar Disorder, Epilepsy, Seizures, Forgetfullness, Glaucoma, Gall     Stones, Head Injury, Heart Attack, Heart Murmur, Hemorrhoids/Rectal Prob (REFLUX    ), Hepatitis, High Cholesterol, HIV (Do not ask - volu, Jaundice, Kidney or     Bladder Disease, Kidney Stones, Migrane Headaches, Mitral Valve Prolapse, Night     sweats, Phlebitis, Psychiatric Care, Reflux Disease, Rheumatic Fever, Sexually     Transmitted Dis, Sinus Trouble, Skin Disease/Psoriais/Ecz, Stroke, Thyroid     Problem, Tuberculosis or Pos TB Te, Miscellaneous Medical/oth      Psychiatric History      None            PREVENTION      Hx Influenza Vaccination:  No      Date Influenza Vaccine Given:  Nov 1, 2017      Influenza Vaccine Declined:  No      2 or More Falls Past Year?:  No      Fall  Past Year with Injury?:  No      Hx Pneumococcal Vaccination:  No      Encouraged to follow-up with:  PCP regarding preventative exams.      Chart initiated by      Lynn Case MA            ALLERGIES/MEDICATIONS      Allergies:        Coded Allergies:             *No Known Allergies (Verified  Allergy, Mild, 4/17/18)      Medications    Last Reconciled on 4/17/18 15:29 by LYNN CASE      Rifampin (Rifampin) 300 Mg Cap      600 MG PO MOWEFR, #30 CAP         Prov: ROSITA GREEN         2/6/18       Ethambutol (Ethambutol) 400 Mg Tablet      2000 MG PO MOWEFR, #60 TAB         Prov: ROSITA GREEN         2/6/18       Azithromycin (Azithromycin) 250 Mg Tablet      500 MG PO MoWeFr@0900, #30 TAB 11 Refills         Prov: ROSIAT GREEN         2/6/18       Ethambutol (Ethambutol) 400 Mg Tablet      2000 MG PO MoWeFr@0900, #60 TAB 11 Refills         Prov: ROSITA GREEN         2/6/18       Fluticasone Furoate (Arnuity Ellipta) 100 Mcg Blst.w.dev      1 PUFF INH RTQDAY for 30 Days, #1 INH 3 Refills         Prov: ROSITA GREEN         1/30/18       Omeprazole (Omeprazole*) 40 Mg Capsule      40 MG PO BID for 30 Days, #60 CAP 2 Refills         Prov: ROSITA GREEN         1/30/18       Albuterol/Ipratropium (Duoneb) 3 Ml Ampul.neb      3 ML INH RTQ4H for 30 Days, #180 NEB 3 Refills         Prov: Zaki Marcum         1/17/18       Azithromycin (Azithromycin) 250 Mg Tablet      500 MG PO MOWEFR, #24 TAB         Prov: Zaki Marcum         1/15/18       NEB-Albuterol Sulf (Albuterol) 2.5 Mg/0.5 Ml Vial.neb      2.5 MG INH QID Y for DYSPNEA/WHEEZING, #120 NEB 5 Refills         Prov: LAINEY VELASQUEZ         1/11/18       Rifampin (Rifadin) 300 Mg Cap      600 MG PO MoWeFr@0700, #30 CAP 11 Refills         Prov: LAINEY VELASQUEZ         1/11/18       Beclomethasone Dipropionate (QVAR 80 Mcg) 8.7 Gm Aer.w.adap      2 PUFFS INH RTBID, #1 INH         Reported         1/6/18       Umeclidinium/Vilanterol 62.5-25 Mcg Inh (Anoro Ellipta 62.5-25 Mcg Inh)  1 Each     Blst.w.dev      1 PUFF INH QDAY, #3 INH 3 Refills         Prov: Zaki Marcum         11/22/17       Aspirin (Aspirin*) 81 Mg Tab.chew      81 MG PO QAM, #30 TAB.CHEW 0 Refills         Reported         9/14/17       Furosemide* (Lasix*) 40 Mg Tablet      40 MG PO BID@09,17 Y for SWELLING, #60 TAB 0 Refills         Reported         9/14/17       Oxygen (OXYGEN)  Gas               Reported         10/19/16       Montelukast Sodium (Montelukast*) 10 Mg Tablet      10 MG PO HS, TAB         Reported         9/25/13       Lovastatin (Lovastatin) 40 Mg Tablet      40 MG PO HS         Reported         9/25/13      Current Medications      Current Medications Reviewed 4/17/18            EXAM      VITAL SIGNS:  Reviewed.  /80, pulse ox 93% on room air at rest.        GENERAL:  This is an elderly female who appears stated age, speaking in full     sentences on room air without dyspnea.        NECK:  Supple without tracheal deviation or lymphadenopathy.  No thyromegaly     appreciated.      LYMPHATICS:  No cervical or supraclavicular lymphadenopathy.      HEENT: Pupils are equal, round and reactive to light. There is no scleral     icterus.  Nares patent without hypertrophy of the turbinates. No erythema of     the passages.  TMs are clear bilaterally with good cone of light. The posterior     pharynx is without  lesions or erythema.      RESPIRATORY:  There is diminished aeration throughout, diminished breath sounds     specifically in the lower lobes bilaterally.  No wheezes, rhonchi or rales     appreciated.  Tympanic to percussion.        CARDIOVASCULAR:  Regular rate and rhythm.  No murmurs, gallops or rubs.  No     lower extremity edema.  Equal radial pulses.        GI: Soft, nontender, nondistended, no organomegaly.  Bowel sounds present in     all four quadrants.      MUSCULOSKELETAL:  No joint effusions, erythema or warmth over the major joint     systems.      SKIN:  No rashes or lesions.       NEUROLOGIC: Cranial nerves II-XII are intact bilaterally.  Moves all     extremities. Ambulates with ease.      PSYCH:  Appropriate mood and affect.      Vitals      Vitals:             Height 5 ft 0 in / 152.4 cm           Weight 173 lbs 4 oz / 78.175893 kg           BSA 1.86 m2           BMI 33.8 kg/m2           Temperature 98.2 F / 36.78 C - Oral           Pulse 77           Respirations 16           Blood Pressure 156/80 Sitting, Left Arm           Pulse Oximetry 93%, roomair@rest            REVIEW      Results Reviewed      P[facility] Results Reviewed?:  Yes Prev Lab Results, Yes Prev Radiology Results, Yes     Previous [facility] Records      Lab Results      I reviewed my last clinic note.            PLAN      Assessment      Notes      New Medications      * Azithromycin (Zithromax) 250 MG TABLET: 250 MG PO QDAY #30      * Neb-Levalbuterol (Xopenex) 1.25 MG/3 ML VIAL.NEB: 1.25 MG INH RTQ6H #120       Instructions: DIAGNOSIS CODE REQUIRED PRIOR TO PRESCRIBING.      Renewed Medications      * Umeclidinium/Vilanterol 62.5-25 Mcg Inh (Anoro Ellipta 62.5-25 Mcg Inh) 1     EACH BLST.W.DEV: 1 PUFF INH QDAY #3      * Fluticasone Furoate (Arnuity Ellipta) 100 MCG BLST.W.DEV: 1 PUFF INH RTQDAY     30 Days #1      Discontinued Medications      * Rifampin (Rifadin) 300 MG CAP: 600 MG PO MoWeFr@0700 #30      * Azithromycin 250 MG TABLET: 500 MG PO MOWEFR #24       Instructions: 500 mg azithro, monday, wednesday friday to continue.       * Albuterol/Ipratropium (Duoneb) 3 ML AMPUL.NEB: 3 ML INH RTQ4H 30 Days #180       Instructions: DIAGNOSIS CODE REQUIRED PRIOR TO PRESCRIBING.      * Ethambutol 400 MG TABLET: 2,000 MG PO MoWeFr@0900 #60      * Azithromycin 250 MG TABLET: 500 MG PO MoWeFr@0900 #30      * Ethambutol 400 MG TABLET: 2,000 MG PO MOWEFR #60      * Rifampin 300 MG CAP: 600 MG PO MOWEFR #30      ASSESSMENT:  The patient is a 89 year old female with a history of COPD,     emphysema, bronchiectasis, chronic hypoxemic  respiratory failure on home oxygen     2-3 liters, diastolic congestive heart failure, stage 3 CKD as well as chronic     mycobacterium avium complex who presents for follow up.            1.  MAC on triple antibiotic therapy (rifampin/azithromycin/ethambutol.        2.  COPD/asthma overlap syndrome.      3.  Chronic hypoxemic respiratory failure.      4.  Chronic bronchiectasis.      5.  Neuropathy of the lower extremities.      6. CKD stage 3.      7.  Gastroesophageal reflux disease.        8. Seasonal allergic rhinitis, well-controlled.              PLAN:      1.  I have told the patient to treatment for mycobacterium avium complex as     currently she is having more side effects from the treatment than benefit from     taking the medication.      2.  We will start her on a vest therapy to help her with airway clearance     techniques, continue anoro and arnuity.       3. I have called her in Xopenex as she feels that the albuterol ipratropium is     making her quite jittery.      4.  Start azithromycin 250 mg po once daily for suppression and chronic COPD.        5. Follow up within 3-4 months.            Patient Education      Other Education:  MAC therapy, airway clearance                 Disclaimer: Converted document may not contain table formatting or lab diagrams. Please see [employer] for the authenticated document.

## 2021-05-28 NOTE — PROGRESS NOTES
Patient: ANTONY MARTINEZ     Acct: [id number]     Report: #[id number]  UNIT #: [id number]     : 1925    Encounter Date:10/02/2018  PRIMARY CARE: DAYDAY MARY DO  ***Signed***  --------------------------------------------------------------------------------------------------------------------  Chief Complaint      Encounter Date      Oct 2, 2018            Primary Care Provider      MARGARITA HOFFMAN            Referring Provider      SELF,REFERRED            [facility]            Patient Complaint      Patient is complaining of      Pt here for follow up/COPD            VITALS      Height 5 ft 0 in / 152.4 cm      Weight 177 lbs 3 oz / 80.721440 kg      BSA 1.77 m2      BMI 34.6 kg/m2      Temperature 98.3 F / 36.83 C - Oral      Pulse 84      Respirations 16      Blood Pressure 148/67 Sitting, Right Arm      Pulse Oximetry 94%, room air      Exhaled Nitrous Oxide Testin            HPI      The patient is a pleasant 89 year old female with a history of bronchiectasis     and mycobacterium avium complex who was intolerant to treatment. She also has     chronic hypoxic respiratory failure and wears oxygen intermittently.  She     presents today for routine follow up.  She is complaining of a runny nose,     fullness in her sinuses and a productive cough. She was diagnosed with pneumonia    approximately one month ago in the right lower lobe and started on an antibioti    c, but she does not recall the name of it.  Her symptoms improved in regards to     her pneumonia.  She is afebrile and denies chills. She has not traveled     recently, but she is planning to take travel in the next six months or so.  She     complains of leg swelling and leg pain. She is on gabapentin, allopurinol and     colchicine for her gout and neuropathy.  She takes Lasix as needed for her     swelling. She is accompanied today by her daughter.  The patient states she     overall feels about the same.  She is using a flutter several  times a day that     helps her expectorate her sputum.              Review of systems is notable for cough and nasal discharge as well as lower     extremity edema and neuropathy of the legs.            Past medical, family, social and surgical history updated in the chart,     unchanged since last visit.               Medications were reviewed and updated in the chart.            ROS      Constitutional:  Denies: Fatigue, Fever, Weight gain, Weight loss, Chills,     Insomnia, Other      Respiratory/Breathing:  Complains of: Cough; Denies: Shortness of air, Wheezing,    Hemoptysis, Pleuritic pain, Other      Endocrine:  Denies: Polydipsia, Polyuria, Heat/cold intolerance, Abnorml     menstrual pattern, Diabetes, Other      Eyes:  Denies: Blurred vision, Vision Changes, Other      Ears, nose, mouth, throat:  Complains of: Nasal Discharge; Denies: Congestion,     Dysphagia, Hearing Changes, Nose Bleeding, Throat pain, Tinnitus, Other      Cardiovascular:  Complains of: Peripheral Edema; Denies: Chest Pain, Exertional     dyspnea, Palpitations, Syncope, Wake up Gasping for air, Orthopnea, Tachycardia,    Other      Gastrointestinal:  Denies: Abdominal pain/cramping, Bloody stools, Constipation,    Diarrhea, Melena, Nausea, Vomiting, Other      Genitourinary:  Denies: Dysuria, Urinary frequency, Incontinence, Hematuria,     Urgency, Other      Musculoskeletal:  Complains of: Other; Denies: Joint Pain, Joint Stiffness,     Joint Swelling, Myalgias      Hematologic/lymphatic:  DENIES: Lymphadenopathy, Bruising, Bleeding tendencies,     Other      Neurologic:  Denies: Headache, Numbness, Weakness, Seizures, Other      Psychiatric:  Denies: Anxiety, Appropriate Effect, Depression, Other      Sleep:  No: Excessive daytime sleep, Morning Headache?, Snoring, Insomnia?, Stop    breathing at sleep?, Other      Integumentary:  Denies: Rash, Dry skin, Skin Warm to Touch, Other            FAMILY/SOCIAL/MEDICAL HX      Surgical  History:  Yes: Appendectomy, Cholecystectomy; No: AAA Repair, Abdominal    Surgery, Angioplasty, Back Surgery, Bladder Surgery, Bowel Surgery, Breast     Surgery, CABG, Carotid Stenosis, Ear Surgery, Eye Surgery, Head Surgery, Hernia     Surgery, Kidney Surgery, Nose Surgery, Oral Surgery, Orthopedic Surgery,     Prostatectomy, Rectal Surgery, Spinal Surgery, Testicular Surgery, Throat     Surgery, Valve Replacement, Vascular Surgery, Other Surgeries      Stroke - Family Hx:  Brother, Aunt      Heart - Family Hx:  Mother, Brother      Diabetes - Family Hx:  Mother, Brother, Aunt      Is Father Still Living?:  No      Is Mother Still Living?:  No      Social History:  No Tobacco Use, No Alcohol Use, No Recreational Drug use      Smoking status:  Former smoker (2 ppd x20 years )      Hysterectomy:  Yes      Anticoagulation Therapy:  No      Antibiotic Prophylaxis:  No      Medical History:  Yes: Arthritis, Asthma (ASTHAMATIC BRONCHITIS), Chronic     Bronchitis/COPD, Congestive Heart Failu, Diabetes (TYPE II ), Gout, Hiatal     Hernia, High Blood Pressure, Shortness Of Breath (COPD, PNEUMONIA IN PAST); No:     Alcoholism, Allergies, Anemia, Blood Disease, Broken Bones, Cataracts, Chemical     Dependency, Chemotherapy/Cancer, Emphysema, Chronic Liver Disease, Colon     Trouble, Colitis, Diverticulitis, Deafness or Ringing Ears, Convulsions,     Depression, Anxiety, Bipolar Disorder, Epilepsy, Seizures, Forgetfullness,     Glaucoma, Gall Stones, Head Injury, Heart Attack, Heart Murmur,     Hemorrhoids/Rectal Prob (REFLUX), Hepatitis, High Cholesterol, HIV (Do not ask -    volu, Jaundice, Kidney or Bladder Disease, Kidney Stones, Migrane Headaches,     Mitral Valve Prolapse, Night sweats, Phlebitis, Psychiatric Care, Reflux     Disease, Rheumatic Fever, Sexually Transmitted Dis, Sinus Trouble, Skin     Disease/Psoriais/Ecz, Stroke, Thyroid Problem, Tuberculosis or Pos TB Te,     Miscellaneous Medical/oth      Psychiatric  History      None            PREVENTION      Hx Influenza Vaccination:  No      Date Influenza Vaccine Given:  Nov 1, 2017      Influenza Vaccine Declined:  No      2 or More Falls Past Year?:  No      Fall Past Year with Injury?:  No      Hx Pneumococcal Vaccination:  No      Encouraged to follow-up with:  PCP regarding preventative exams.      Chart initiated by      Karlie Teague MA            ALLERGIES/MEDICATIONS      Allergies:        Coded Allergies:             *No Known Allergies (Verified  Allergy, Mild, 10/2/18)      Medications    Last Reconciled on 10/2/18 11:35 by ROSITA GREEN DO      Derrick-Fluticasone (Fluticasone 50 mcg) 16 Gm Spray.susp      2 PUFFS NARE EACH QDAY, #1 BOTTLE 0 Refills         Prov: ROSITA GREEN         10/2/18       predniSONE* (predniSONE*) 20 Mg Tablet      40 MG PO QDAY, #10 TAB         Prov: ROSITA GREEN         10/2/18       Azithromycin Z-Sanket (Zithromax Z-Sanket) 250 Mg Tablet      250 MG PO ASDIR, #1 PACKET         Prov: ROSITA GREEN         10/2/18       amLODIPine (amLODIPine) 5 Mg Tablet      5 MG PO QDAY, #30 TAB         Reported         10/2/18       Allopurinol (Allopurinol) 100 Mg Tablet      100 MG PO QDAY, #30 TAB 0 Refills         Reported         10/2/18       Gabapentin (Gabapentin) 250 Mg/5 Ml Solution      300 MG PO BID, #360 ML 0 Refills         Reported         10/2/18       Colchicine (Colcrys) 0.6 Mg Tab      0.6 MG PO QDAY for 30 Days, #30 TAB         Reported         10/2/18       Fluticasone Furoate (Arnuity Ellipta) 200 Mcg Blst.w.dev      1 PUFF INH RTQDAY, #1 INH 9 Refills         Prov: ROSITA GREEN         7/19/18       Neb-Levalbuterol (Xopenex) 1.25 Mg/3 Ml Vial.neb      1.25 MG INH RTQ6H, #120 ML 2 Refills         Prov: ROSITA GREEN         4/17/18       Umeclidinium/Vilanterol 62.5-25 Mcg Inh (Anoro Ellipta 62.5-25 Mcg Inh) 1 Each     Blst.w.dev      1 PUFF INH QDAY, #3 INH 3 Refills         Prov: ROSITA GREEN         4/17/18       Aspirin (Aspirin*)  81 Mg Tab.chew      81 MG PO QAM, #30 TAB.CHEW 0 Refills         Reported         9/14/17       Furosemide* (Lasix*) 40 Mg Tablet      40 MG PO BID@09,17 PRN for SWELLING, #60 TAB 0 Refills         Reported         9/14/17       Oxygen (OXYGEN)  Gas               Reported         10/19/16       Montelukast Sodium (Montelukast*) 10 Mg Tablet      10 MG PO HS, TAB         Reported         9/25/13       Lovastatin (Lovastatin) 40 Mg Tablet      40 MG PO HS         Reported         9/25/13      Current Medications      Current Medications Reviewed 10/2/18            EXAM      Vitals      Vitals:             Height 5 ft 0 in / 152.4 cm           Weight 177 lbs 3 oz / 80.270240 kg           BSA 1.77 m2           BMI 34.6 kg/m2           Temperature 98.3 F / 36.83 C - Oral           Pulse 84           Respirations 16           Blood Pressure 148/67 Sitting, Right Arm           Pulse Oximetry 94%, room air            Assessment      Notes      New Medications      * Colchicine (Colcrys) 0.6 MG TAB: 0.6 MG PO QDAY 30 Days #30      * Gabapentin 250 MG/5 ML SOLUTION: 300 MG PO BID #360      * Allopurinol 100 MG TABLET: 100 MG PO QDAY #30      * amLODIPine 5 MG TABLET: 5 MG PO QDAY #30      * Derrick-Fluticasone (Fluticasone 50 mcg) 16 GM SPRAY.SUSP: 2 PUFFS NARE EACH QDAY       #1      * Azithromycin Z-Sanket (Zithromax Z-Sanket) 250 MG TABLET: 250 MG PO ASDIR #1         Instructions: Take 500 mg (two tablets) by mouth the first day, then 250 mg        (one tablet) daily until all taken.      * predniSONE* 20 MG TABLET: 40 MG PO QDAY #10      Discontinued Medications      * NEB-Albuterol Sulf (Albuterol) 2.5 MG/0.5 ML VIAL.NEB: 2.5 MG INH QID PRN       DYSPNEA/WHEEZING #120         Instructions: Submit to Medicare B if applicable. Call for Diagnosis if        needed.      * Omeprazole (Omeprazole*) 40 MG CAPSULE: 40 MG PO BID 30 Days #60      ASSESSMENT:  The patient is a 89 year old female with a history of COPD, e    mphysema,  bronchiectasis, chronic hypoxemic respiratory failure on home oxygen     2-3 liters, diastolic congestive heart failure, stage 3 CKD as well as chronic     mycobacterium avium complex who presents for follow up.            1.  MAC on triple antibiotic therapy (rifampin/azithromycin/ethambutol.        2.  COPD/asthma overlap syndrome.      3.  Chronic hypoxemic respiratory failure.      4.  Chronic bronchiectasis.      5.  Neuropathy of the lower extremities.      6. CKD stage 3.      7.  Gastroesophageal reflux disease.        8. Seasonal allergic rhinitis, well-controlled.              PLAN:      1.  I have told the patient to treatment for mycobacterium avium complex as     currently she is having more side effects from the treatment than benefit from     taking the medication.      2.  We will start her on a vest therapy to help her with airway clearance     techniques, continue anoro and arnuity.       3. I have called her in Xopenex as she feels that the albuterol ipratropium is     making her quite jittery.      4.  Start azithromycin 250 mg po once daily for suppression and chronic COPD.        5. Follow up within 3-4 months.            Patient Education      Patient Education Provided:  COPD                 Disclaimer: Converted document may not contain table formatting or lab diagrams. Please see [facility] for the authenticated document.

## 2021-05-28 NOTE — PROGRESS NOTES
Patient: CARRINGTON LEDBETTER     Acct: [id number]     Report: #[id number]  UNIT #: [id number]     : 1925    Encounter Date:2020  PRIMARY CARE: DAYDAY MARY DO  ***Signed***  --------------------------------------------------------------------------------------------------------------------  TELEHEALTH NOTE      History of Present Illness      Chief Complaint: Acute, Cough, SOA, congestion             Carrington Ledbetter is presenting for evaluation via Telehealth visit. Verbal     consent obtained before beginning visit.            Provider spent 14 minutes with the patient during telehealth visit.            The following staff were present during the visit: Petra Montez CMA, Jacqueline LITTLE             The patient is a 89 year old female patient of Dr. Benítez with chronic hypoxic     respiratory failure, chronic mycobacterium avium infection and could not     tolerate treatment. The patient also has a history of bronchiectasis and chronic    cough and difficulty with airway clearance and bouts of recurrent bronchitis.     The patient presents for acute Telehealth visit secondary to increasing     shortness of air and coughing. The patient states her symptoms started worsening    3 days ago. The patient states she gets short of air walking from room to room.     The patient states she has been staying home secondary to COVID-19 however f    [employer] members have been coming by her house. The patient has been treated with a    Z-pack and doxycycline within the last month. The patient reports she is using     her flutter valve twice daily with levalbuterol. The patient states she has been    taking anoro everyday as prescribed. The patient states she is only coughing up     very little. The patient denies any fever or chills, night sweats, hemoptysis,     swollen glands in head and neck, unintentional weight loss, chest pain or chest     tightness, abdominal pain, nausea or vomiting or diarrhea.  The patient denies     any changes in sense of taste or smell and myalgias or headaches. The patient     states she does have intermittent leg swelling and is under the care of Dr. Dinorah Trujillo and is scheduled to follow up with him 05/06/2020 to have her pacemaker     checked.             I reviewed the Review of Systems, medical, surgical and family history and agree    with those as entered.                            Past Med History      HX: COPD, Bronchiectasis, Respiratory Failure      Former Smoker x20 years       Vaccines - Current      Overview of Symptoms      Complains of: Cough, SOA, wheezing, chest congestion, ankle swelling      Denies: Fever, chills, body aches            Allergies/Medications      Allergies:        Coded Allergies:             *No Known Allergies (Verified  Allergy, Mild, 10/30/19)      Medications    Last Reconciled on 5/4/20 09:57 by ELINOR VILLARREAL,       dilTIAZem LA (Cardizem LA) 240 Mg Tab.er.24h      240 MG PO QDAY for 30 Days, #30 TAB.ER.24H         Reported         4/15/20       Neb-Levalbuterol (Xopenex) 1.25 Mg/3 Ml Vial.neb      1.25 MG INH RTQ6H, #120 ML 2 Refills         Prov: ROSITA GREEN         11/5/19       Irbesartan (Irbesartan) 75 Mg Tablet      75 MG PO QDAY for 30 Days, #30 TAB         Reported         6/12/19       Insulin Degludec (Tresiba Flextouch U-200) 200 Unit/1 Ml Insuln.pen      8 UNITS SUBQ QDAY for 30 Days, #1 INSULN.PEN         Reported         6/12/19       Furosemide* (Lasix*) 40 Mg Tablet      40 MG PO BID@09,17, #60 TAB 0 Refills         Prov: Sumit Moreno         2/9/19       Insulin Lispro (HumaLOG VIAL) 100 Units/Ml Vial      0 SUBQ QID INSULIN, #1 VIAL 0 Refills         Reported         2/6/19       (Oxygen) (OXYGEN)   No Conflict Check      2 L NS PRN PRN for SHORTNESS OF BREATH         Reported         12/6/18       Allopurinol (Allopurinol) 100 Mg Tablet      100 MG PO BID, #30 TAB 0 Refills         Reported         10/2/18        Aspirin Chew (Aspirin Chew) 81 Mg Tab.chew      81 MG PO QAM, #30 TAB.CHEW 0 Refills         Reported         9/14/17       Montelukast Sodium (Montelukast*) 10 Mg Tablet      10 MG PO HS, TAB         Reported         9/25/13            Plan/Instructions      Ambulatory Assessment/Plan:        Dyspnea - R06.00            Cough - R05            Notes      New Medications      * predniSONE 20 MG TABLET: 40 MG PO QDAY 7 Days #14      Discontinued Medications      * Doxycycline Hyclate (Doxycycline Hyclate*) 100 MG CAPSULE: 100 MG PO BID 7       Days #14      New Diagnostics      * Chest 2 View, 1 DAY         Dx: Dyspnea - R06.00      * Sputum Culture W/Gram Stain         Dx: Cough - R05      Plan/Instructions      * Plan Of Care: ()            * Chronic conditions reviewed and taken into consideration for today's treatment       plan.      * Patient instructed to seek medical attention urgently for new or worsening       symptoms.      * Patient was educated/instructed on their diagnosis, treatment and medications       prior to discharge from the clinic today.            ASSESSMENT:      1. Dyspnea.       2. Cough.       3. Recurrent bronchitis.       4. Mycobacterium avium infection of lung intolerant to triple antibiotics therap    y.       5. Chronic hypoxic respiratory failure on continuous oxygen.       6. Bronchiectasis.       7. Difficulty with airway clearance.       8. Chronic obstructive pulmonary disease.       9. Tobacco abuse of cigarettes in remission X 20 years.             PLAN:      1. Due to the patient's worsening respiratory symptoms I will refer her to the     respiratory clinic to be tested for COVID-19 due to family members coming in and     out of her home and could have potential COVID-19 exposure.       2. I will order a chest x-ray and sputum culture.       3. Continue anoro everyday as prescribed.       4. Continue albuterol inhaler and nebulizer treatment as prescribed.       5. Continue  flutter valve and nebulizer at least twice daily.       6. Continue oxygen at current rate.       7. I will prescribe patient a prednisone burst.       8. The patient is advised to call the office, call 911 or go to the ER for any     new or worsening symptoms.       9. Follow up with Dr. Mcfarlane as scheduled.       10. Follow up in 1-2 weeks, sooner if needed.      Codes:  Phone Eval [id number] mi [phone]            Electronically signed by ELINOR VILLARREAL Psychiatric  05/05/2020 12:30       Disclaimer: Converted document may not contain table formatting or lab diagrams. Please see [facility] for the authenticated document.

## 2021-05-28 NOTE — PROGRESS NOTES
Patient: ANTONY MARTINEZ     Acct: [id number]     Report: #[id number]  UNIT #: [id number]     : 1925    Encounter Date:2021  PRIMARY CARE: DAYDAY MARY DO  ***Signed***  --------------------------------------------------------------------------------------------------------------------  Chief Complaint      Encounter Date      Mar 25, 2021            Primary Care Provider      DAYDAY MARY DO            Referring Provider            [facility]            Patient Complaint      Patient is complaining of      PT here for 4-6W F/U Resp. Failure, COPD, Asthma, Hypertension            VITALS      Height 4 ft 11 in / 149.86 cm      Weight 135 lbs  / 61.833775 kg      BSA 1.56 m2      BMI 27.3 kg/m2      Temperature 97.3 F / 36.28 C - Temporal      Pulse 88      Respirations 15      Blood Pressure 142/76 Sitting, Right Arm      Pulse Oximetry 100%, nasal canula, 2 lpm            HPI      The patient is a 89 year old  female used to follow-up with Dr. 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 The patient also has chronic hypoxic respiratory     failure.  At present patient is being followed by .  Patient continues     to has been having the cough and is sometimes very difficult to bring up the     phlegm.  And also has bleeding through the nose intermittently.  She was tried     on the nebulizer therapy. The patient was started on Perforomist, budesonide and    Yupelri however the patient states she did not feel like the nebulizer helped so    she stopped taking them and restarted anoro.  She is on levalbuterol unit dose     nebulizer take 3-4 times along with the flutter valve.  Patient has significant     history of heart failure for which she is being followed by the heart doctor and    feels that patient has a lot of fluid in the lungs and also in the legs.      Patient was evaluated by  and had the  thoracentesis on the left side on     March 9.  Cytology report is negative for any malignancy.  The patient states at    times she does have thick secretions that are hard to cough up. The patient     states she also has intermittent wheezing. The patient denies any fever or     chills, night sweats, hemoptysis,  purulent sputum production, swollen glands in    head and neck, unintentional weight loss, chest pain or chest tightness,     abdominal pain, nausea or vomiting or diarrhea. The patient denies  any     headaches, myalgias, sore throat, changes in sense of taste and smell any     coronavirus or flu like symptoms.  Patient also complaining of orthopnea. The     patient is under the care of Dr. Chaney for chronic kidney disease and is now     followed by Dr. Shultz for congestive heart failure. The patient states she is on     2 liters of oxygen continuously via nasal cannula.  She does not have any     humidifier bottle with her oxygen at home.  The patient reports she quit smoking    in 1985.      Patient present medical history, family history, social history and other     related is reviewed and as documented.  Patient is accompanied by her niece.            ROS      Constitutional:  Denies: Fatigue, Fever, Weight gain, Weight loss, Chills,     Insomnia, Other      Respiratory/Breathing:  Complains of: Shortness of air, Wheezing, Cough; Denies:    Hemoptysis, Pleuritic pain, Other      Endocrine:  Denies: Polydipsia, Polyuria, Heat/cold intolerance, Abnorml     menstrual pattern, Diabetes, Other      Eyes:  Denies: Blurred vision, Vision Changes, Other      Ears, nose, mouth, throat:  Denies: Mouth lesions, Thrush, Throat pain,     Hoarseness, Allergies/Hay Fever, Post Nasal Drip, Headaches, Recent Head Injury,    Nose Bleeding, Neck Stiffness, Thyroid Mass, Hearing Loss, Ear Fullness, Dry     Mouth, Nasal or Sinus Pain, Dry Lips, Nasal discharge, Nasal congestion, Other      Cardiovascular:  Denies:  Palpitations, Syncope, Claudication, Chest Pain, Wake     up Gasping for air, Leg Swelling, Irregular Heart Rate, Cyanosis, Dyspnea on     Exertion, Other      Gastrointestinal:  Denies: Nausea, Constipation, Diarrhea, Abdominal pain,     Vomiting, Difficulty Swallowing, Reflux/Heartburn, Dysphagia, Jaundice,     Bloating, Melena, Bloody stools, Other      Genitourinary:  Denies: Urinary frequency, Incontinence, Hematuria, Urgency,     Nocturia, Dysuria, Testicular problems, Other      Musculoskeletal:  Denies: Joint Pain, Joint Stiffness, Joint Swelling, Myalgias,    Other      Hematologic/lymphatic:  DENIES: Lymphadenopathy, Bruising, Bleeding tendencies,     Other      Neurological:  Denies: Headache, Numbness, Weakness, Seizures, Other      Psychiatric:  Denies: Anxiety, Appropriate Effect, Depression, Other      Sleep:  No: Excessive daytime sleep, Morning Headache?, Snoring, Insomnia?, Stop    breathing at sleep?, Other      Integumentary:  Denies: Rash, Dry skin, Skin Warm to Touch, Other      Immunologic/Allergic:  Denies: Latex allergy, Seasonal allergies, Asthma,     Urticaria, Eczema, Other      Immunization status:  No: Up to date            FAMILY/SOCIAL/MEDICAL HX      Surgical History:  Yes: Appendectomy, Cholecystectomy; No: AAA Repair, Abdominal    Surgery, Angioplasty, Back Surgery, Bladder Surgery, Bowel Surgery, Breast     Surgery, CABG, Carotid Stenosis, Ear Surgery, Eye Surgery, Head Surgery, Hernia     Surgery, Kidney Surgery, Nose Surgery, Oral Surgery, Orthopedic Surgery,     Prostatectomy, Rectal Surgery, Spinal Surgery, Testicular Surgery, Throat     Surgery, Valve Replacement, Vascular Surgery, Other Surgeries      Stroke - Family Hx:  Brother, Aunt      Heart - Family Hx:  Mother, Brother      Diabetes - Family Hx:  Mother, Brother, Aunt      Is Father Still Living?:  No      Is Mother Still Living?:  No      Social History:  No Tobacco Use, No Alcohol Use, No Recreational Drug use       Smoking status:  Former smoker (18yo, 1.5 ppd, quit 1983)      Hysterectomy:  Yes      Anticoagulation Therapy:  No      Antibiotic Prophylaxis:  No      Medical History:  Yes: Arthritis (GENERAL), Asthma (ASTHAMATIC BRONCHITIS),     Chronic Bronchitis/COPD, Congestive Heart Failu, Depression, Diabetes (TYPE II     ), Gout, Hiatal Hernia, High Blood Pressure, Kidney or Bladder Disease,     Shortness Of Breath (COPD, PNEUMONIA); No: Anemia, Blood Disease, Broken Bones,     Cataracts, Chemical Dependency, Chemotherapy/Cancer, Emphysema, Chronic Liver     Disease, Colon Trouble, Colitis, Diverticulitis, Deafness or Ringing Ears,     Anxiety, Bipolar Disorder, Epilepsy, Seizures, Glaucoma, Gall Stones, Head     Injury, Heart Attack, Heart Murmur, Hemorrhoids/Rectal Prob (REFLUX), Hepatitis,    HIV (Do not ask - volu, Kidney Stones, Migrane Headaches, Mitral Valve Prolapse,    Psychiatric Care, Reflux Disease, Rheumatic Fever, Sexually Transmitted Dis,     Sinus Trouble, Skin Disease/Psoriais/Ecz, Stroke, Thyroid Problem, Tuberculosis     or Pos TB Te, Miscellaneous Medical/oth      Psychiatric History      Depression            PREVENTION      Hx Influenza Vaccination:  Yes      Date Influenza Vaccine Given:  Feb 28, 2021      Influenza Vaccine Declined:  No      2 or More Falls in Past Year?:  No      Fall Past Year with Injury?:  No      Hx Pneumococcal Vaccination:  Yes      Encouraged to follow-up with:  PCP regarding preventative exams.      Chart initiated by      Nicolasa Lerner MA            ALLERGIES/MEDICATIONS      Allergies:        Coded Allergies:             *No Known Allergies (Verified  Allergy, Mild, 3/25/21)      Medications    Last Reconciled on 3/25/21 13:30 by RITA DIETRICH      Fluticasone/Umeclidin/Vilanter (Trelegy Ellipta 200-62.5-25) 1 Each Blst.w.dev               Prov: RITA DIETRICH         3/25/21       Fluticasone/Umeclidin/Vilanter (Trelegy Ellipta 200-62.5-25) 1 Each Blst.w.dev       1 PUFF INH QDAY for 30 Days, #3 INH         Prov: RITA DIETRICH         3/25/21       metOLazone (metOLazone) 2.5 Mg Tablet      2.5 MG PO QDAY, TAB         Reported         3/25/21       Furosemide (Furosemide) 40 Mg Tablet      40 MG PO BID@09,17, #60 TAB 0 Refills         Reported         3/25/21       Aspirin Chew (Aspirin Baby) 81 Mg Tab.chew      81 MG PO QDAY, #30 TAB.CHEW 0 Refills         Reported         3/9/21       Losartan Potassium (Losartan*) 25 Mg Tablet      25 MG PO QDAY, #30 TAB 0 Refills         Reported         3/9/21       Carvedilol (Carvedilol) 12.5 Mg Tablet      12.5 MG PO BID, #60 TAB 0 Refills         Reported         3/9/21       NEB-Levalbuterol (LEVALBUTEROL HCL) 1.25 Mg/3 Ml Vial.neb      1.25 MG INH RTQ4H PRN for SHORTNESS OF BREATH, #360 NEB 3 Refills         Prov: [facility]         1/8/21       Neb-NaCl 3% (Sodium Chloride 3% Neb) 4 Ml Vial.neb      4 ML INH RTBID for 30 Days, #60 NEB 3 Refills         Prov: [facility]         12/8/20       Umeclidinium/Vilanterol 62.5-25 Mcg Inh (Anoro Ellipta 62.5-25 Mcg Inh) 1 Each     Blst.w.dev      1 PUFF INH QDAY, #1 INH 0 Refills         Reported         12/8/20       Potassium Chloride ER (Potassium Chloride ER) 8 Meq Capsule.er      8 MEQ PO QDAY for 30 Days, #30 CAP.SA         Reported         12/8/20       (Oxygen) (OXYGEN)   No Conflict Check      2 L NS PRN PRN for SHORTNESS OF BREATH         Reported         12/6/18      Current Medications      Current Medications Reviewed 3/25/21            EXAM      Vtials      Vitals:             Height 4 ft 11 in / 149.86 cm           Weight 135 lbs  / 61.125293 kg           BSA 1.56 m2           BMI 27.3 kg/m2           Temperature 97.3 F / 36.28 C - Temporal           Pulse 88           Respirations 15           Blood Pressure 142/76 Sitting, Right Arm           Pulse Oximetry 100%, nasal canula, 2 lpm            Constitutional      General appearance:  Chronically  ill            Eyes      Conjunctiva:  Bilateral: Normal            ENMT      Nasal cavity:           Nostril:  Bilateral: Other (Dry)         Discharge:  Bilateral: None      Throat:  Normal (And dry)            Neck      Enlarged nodes:  Bilateral: None      Thyroid - size:  Normal            Respiratory      Work of breathing:  Normal      Auscultation:  Bilateral: Crackles/rales (At the bases posteriorly), Diminished     at base            Cardiovascular      Rhythm:  regular      Heart sounds:  NORMAL: S1, S2            Gastrointestinal      Abdomen description:  Normal      Hepatosplenomegaly:  none      Mass:  None            Extremity      Radial pulse:  Bilateral: Other (1-2+ edema of the legs bilaterally)            Skin      General color:  Normal (And patient is on oxygen with the 2 L)            Assessment      Bronchiectasis         Bronchiectasis without complication         Bronchiectasis type: uncomplicated            Mycobacterium avium infection - A31.0            S/P cardiac pacemaker procedure - Z95.0            Cardiomyopathy         Cardiomyopathy, unspecified type         Cardiomyopathy type: unspecified            Pleural effusion - J90            Hypoxemia - R09.02            Former smoker - Z87.891            Chronic bronchitis         Chronic bronchitis, unspecified chronic bronchitis type         Chronic bronchitis type: unspecified            Notes      Reviewed the present patient medical history and management and discussed with     the patient and her family member.      I discussed with her about the importance of using the flutter more often during     the day.  Steam inhalation and gentle padding in the back of the chest as     possible.      Recommend the patient to have the humidifier bottle to the oxygen.  If the     patient continues to have the epistaxis, need to call us are to check with the     primary.      Chest x-rays which was done showed significant improvement of the  pleural     effusion and no pneumothorax.  No malignancy..      As needed chest x-rays and if necessary may need to do the repeat thoracentesis     again and if that is necessary patient may get benefit from Pleurx catheter as     possible.      Patient is not able to take any nebulizers or any medications for the atypical     TB.  Continue to use the oxygen.      Decided to change the present Anoro to Trelegy to take 1 puff and to gargle     mouth.  Instructions were given and the samples were given.  We will check     alpha-1 if necessary will do the family screening.      Follow-up in 6 months with Dr. Marcum      Patient had the sleep study done long time ago and was told that she has sleep     apnea and the patient was not able to use the CPAP.  She does not want to use     the CPAP.      New Medications      * Furosemide 40 MG TABLET: 40 MG PO BID@09,17 #60      * metOLazone 2.5 MG TABLET: 2.5 MG PO QDAY      * Fluticasone/Umeclidin/Vilanter (Trelegy Ellipta 200-62.5-25) 1 EACH       BLST.W.DEV: 1 PUFF INH QDAY 30 Days #3      * Fluticasone/Umeclidin/Vilanter (Trelegy Ellipta 200-62.5-25) 1 EACH BLST.W.DEV                Sample - Qty 2         Instructions: 1 puff QD         Dx: Chronic obstructive pulmonary disease with (acute) exacerbation - J44.1      Discontinued Medications      * Furosemide (Lasix) 40 MG TABLET: 40 MG PO BID@09,17 #60      * SPACER (Spacer) 1 EACH EACH: EACH XX ONCE #1         Instructions: Use as directed with inhalers      * Neb-Budesonide (Pulmicort) 0.5 MG/2 ML AMPUL.NEB: 0.5 MG INH RTBID 30 Days #60         Instructions: DIAGNOSIS CODE REQUIRED PRIOR TO PRESCRIBING.      * predniSONE (Deltasone) 10 MG TABLET: 10 MG PO ASDIR #45         Instructions: 99mdh0w,82hko4o,45dbs8q,02jki0i,20nrg0q      ASSESSMENT:      1. Bronchiectasis.       2. History of chronic mycobacterium avium disease unable to tolerate triple     antibiotics therapy.       3. Difficulty with airway clearance.      4.  History of end stage renal disease recently on dialysis now off dialysis     followed by Dr. Chaney.       5. Congestive heart failure under the care of Dr. Shultz.       6. Acute on chronic hypoxic respiratory failure on continuous oxygen.      7. Tobacco abuse of cigarettes in remission, the patient reports she quit     smoking in 1985.              PLAN:      1. The patient did not feel the nebulizer treatment were beneficial and     restarted anoro on her own. Continue anoro everyday as prescribed. I recommend     the patient restart pulmicort twice daily. The patient states she will try     pulmicort again to see if it helps with her wheezing.       2. I will start the patient on sodium chloride nebulizer treatments to use twice     daily with her flutter valve. The patient is advised to use her flutter valve     twice daily with levalbuterol and sodium chloride nebulizer to assist with     airway clearance.       3. Continue supplemental oxygen to keep oxygen saturation at or above 89%.      4. Follow up with Dr. Chaney for chronic kidney disease as scheduled.       5. Follow up with Dr. Shultz for congestive heart failure.       6. The patient is advised to call the office, call 911 or go to the ER for any     new or worsening symptoms.       7. The patient reports she is up to date with flu and pneumonia vaccines.      8. The patient does not want any steroids at this time or any additional     medication at this time.       9. Continue Singulair everyday as prescribed.       10. I will order a chest x-ray.        11. The patient requests to follow up with Dr. Marcum in 2 months sooner if     needed.            Patient Education            Patient Education:        Chronic Bronchitis            Electronically signed by RITA DIETRICH  03/25/2021 13:31       Disclaimer: Converted document may not contain table formatting or lab diagrams. Please see [employer] for the authenticated  document.

## 2021-05-28 NOTE — PROGRESS NOTES
Patient: ANTONY MARTINEZ     Acct: [id number]     Report: #[id number]  UNIT #: [id number]     : 1925    Encounter Date:2019  PRIMARY CARE: DAYDAY MARY DO  ***Signed***  --------------------------------------------------------------------------------------------------------------------  Chief Complaint      Encounter Date      2019            Primary Care Provider      MARGARITA HOFFMAN            Referring Provider      SELF,REFERRED            [facility]            Patient Complaint      Patient is complaining of      3 month follow up shortness of breath, chronic cough            VITALS      Height 4 ft 11 in / 149.86 cm      Weight 166 lbs 8 oz / 75.419887 kg      BSA 1.71 m2      BMI 33.6 kg/m2      Temperature 98.5 F / 36.94 C - Oral      Pulse 104      Respirations 16      Blood Pressure 120/88 Sitting, Right Arm      Pulse Oximetry 92%, room air      Initial Exhaled Nitrous Oxide      Exhaled Nitrous Oxide Results:  7            HPI      The patient is here for a three month follow up regarding shortness of breath     and chronic cough.  She last saw me in October, has a history of bronchiectasis     secondary to chronic mycobacterium avium complex.  She has chronic hypoxemic     respiratory failure, chronic congestive heart failure and atrial fibrillation.     She is here today for routine follow up.  She is complaining of increased     purulent sputum, worsening wheezing and increased dyspnea.  She has been wearing    her oxygen all of the time, using her rescue inhaler all of the time.  She takes    Anoro on a daily basis as well as arnuity, sometimes arnuity will cause her to     cough more or irritate her throat and she will go off of it for a couple of     weeks at a time. She is on chronic Singulair, Lasix, omeprazole and gabapentin     for severe neuropathy made worse for mycobacterium avium for which she was     intolerant.              Review of systems positive for worsening,  cough and fatigue.            ROS      Constitutional:  Complains of: Fatigue      Respiratory/Breathing:  Complains of: Shortness of air, Wheezing, Cough; Denies:    Hemoptysis, Pleuritic pain, Other      Endocrine:  Denies: Polydipsia, Polyuria, Heat/cold intolerance, Abnorml     menstrual pattern, Diabetes, Other      Eyes:  Denies: Blurred vision, Vision Changes, Other      Ears, nose, mouth, throat:  Denies: Mouth lesions, Thrush, Throat pain,     Hoarseness, Allergies/Hay Fever, Post Nasal Drip, Headaches, Recent Head Injury,    Nose Bleeding, Neck Stiffness, Thyroid Mass, Hearing Loss, Ear Fullness, Dry     Mouth, Nasal or Sinus Pain, Dry Lips, Nasal discharge, Nasal congestion, Other      Cardiovascular:  Denies: Palpitations, Syncope, Claudication, Chest Pain, Wake     up Gasping for air, Leg Swelling, Irregular Heart Rate, Cyanosis, Dyspnea on     Exertion, Other      Gastrointestinal:  Denies: Nausea, Constipation, Diarrhea, Abdominal pain,     Vomiting, Difficulty Swallowing, Reflux/Heartburn, Dysphagia, Jaundice,     Bloating, Melena, Bloody stools, Other      Genitourinary:  Denies: Urinary frequency, Incontinence, Hematuria, Urgency,     Nocturia, Dysuria, Testicular problems, Other      Musculoskeletal:  Denies: Joint Pain, Joint Stiffness, Joint Swelling, Myalgias,    Other      Hematologic/lymphatic:  DENIES: Lymphadenopathy, Bruising, Bleeding tendencies,     Other            FAMILY/SOCIAL/MEDICAL HX      Surgical History:  Yes: Appendectomy, Cholecystectomy; No: AAA Repair, Abdominal    Surgery, Angioplasty, Back Surgery, Bladder Surgery, Bowel Surgery, Breast     Surgery, CABG, Carotid Stenosis, Ear Surgery, Eye Surgery, Head Surgery, Hernia     Surgery, Kidney Surgery, Nose Surgery, Oral Surgery, Orthopedic Surgery,     Prostatectomy, Rectal Surgery, Spinal Surgery, Testicular Surgery, Throat     Surgery, Valve Replacement, Vascular Surgery, Other Surgeries      Stroke - Family Hx:  Brother,  Aunt      Heart - Family Hx:  Mother, Brother      Diabetes - Family Hx:  Mother, Brother, Aunt      Is Father Still Living?:  No      Is Mother Still Living?:  No       Family History:  Yes      Social History:  No Tobacco Use, No Alcohol Use, No Recreational Drug use      Smoking status:  Former smoker (2 ppd x20 years )      Hysterectomy:  Yes      Anticoagulation Therapy:  No      Antibiotic Prophylaxis:  No      Medical History:  Yes: Arthritis (GENERAL), Asthma (ASTHAMATIC BRONCHITIS),     Chronic Bronchitis/COPD, Congestive Heart Failu, Diabetes (TYPE II ), Gout,     Hiatal Hernia, High Blood Pressure, Shortness Of Breath (COPD, PNEUMONIA IN     PAST); No: Alcoholism, Allergies, Anemia, Blood Disease, Broken Bones,     Cataracts, Chemical Dependency, Chemotherapy/Cancer, Emphysema, Chronic Liver     Disease, Colon Trouble, Colitis, Diverticulitis, Deafness or Ringing Ears,     Convulsions, Depression, Anxiety, Bipolar Disorder, Epilepsy, Seizures,     Forgetfullness, Glaucoma, Gall Stones, Head Injury, Heart Attack, Heart Murmur,     Hemorrhoids/Rectal Prob (REFLUX), Hepatitis, High Cholesterol, HIV (Do not ask -    volu, Jaundice, Kidney or Bladder Disease, Kidney Stones, Migrane Headaches,     Mitral Valve Prolapse, Night sweats, Phlebitis, Psychiatric Care, Reflux     Disease, Rheumatic Fever, Sexually Transmitted Dis, Sinus Trouble, Skin     Disease/Psoriais/Ecz, Stroke, Thyroid Problem, Tuberculosis or Pos TB Te,     Miscellaneous Medical/oth      Psychiatric History      none            PREVENTION      Hx Influenza Vaccination:  Yes      Date Influenza Vaccine Given:  Nov 1, 2018      Influenza Vaccine Declined:  No      2 or More Falls Past Year?:  No      Fall Past Year with Injury?:  No      Hx Pneumococcal Vaccination:  Yes      Encouraged to follow-up with:  PCP regarding preventative exams.      Chart initiated by      gabriele cantrell            ALLERGIES/MEDICATIONS      Allergies:        Coded  Allergies:             *No Known Allergies (Verified  Allergy, Mild, 1/2/19)      Medications    Last Reconciled on 10/2/18 11:35 by ROSITA GREEN DO      (Insulin)   No Conflict Check      UNITS SUBQ QID PRN for HYPERGLYCEMIA         Reported         12/6/18       Alpha-D-Galactosidase (Beano) 1 Tab Tab      1 TAB PO TIDAC PRN for GAS, #1 BOTTLE         Reported         12/6/18       Simethicone (Gas-X) 125 Mg Tab.chew      125 MG PO QID PRN for PRN, TAB         Reported         12/6/18       Acetaminophen Es (Tylenol Extra Strength) 500 Mg Tablet      1000 MG PO Q4H PRN for PAIN, #100 TAB 0 Refills         Reported         12/6/18       Gabapentin (Gabapentin) 300 Mg Capsule      300 MG PO QDAY PRN for PAIN, #30 CAP 0 Refills         Reported         12/6/18       glipiZIDE (glipiZIDE*) 10 Mg Tablet      10 MG PO QDAY, #30 TAB 0 Refills         Reported         12/6/18       Omeprazole (Omeprazole*) 40 Mg Capsule      40 MG PO BID, #60 CAP 0 Refills         Reported         12/6/18       Furosemide* (Lasix*) 40 Mg Tablet      40 MG PO QPM PRN for SWELLING, #90 TAB 0 Refills         Reported         12/6/18       Furosemide* (Lasix*) 40 Mg Tablet      40 MG PO QAM, #30 TAB 0 Refills         Reported         12/6/18       amLODIPine (amLODIPine) 5 Mg Tablet      5 MG PO QDAY, #30 TAB         Reported         12/6/18       (Oxygen) (OXYGEN)   No Conflict Check      2 L NS PRN PRN for SHORTNESS OF BREATH         Reported         12/6/18       Umeclidinium/Vilanterol 62.5-25 Mcg Inh (Anoro Ellipta 62.5-25 Mcg Inh) 1 Each     Blst.w.dev      1 PUFF INH QDAY, #3 INH 3 Refills         Prov: ROSITA GREEN         11/26/18       Allopurinol (Allopurinol) 100 Mg Tablet      100 MG PO BID, #30 TAB 0 Refills         Reported         10/2/18       Colchicine (Colcrys) 0.6 Mg Tab      0.6 MG PO QDAY PRN for GOUT PAIN for 30 Days, #30 TAB         Reported         10/2/18       Fluticasone Furoate (Arnuity Ellipta) 200 Mcg  Blst.w.dev      1 PUFF INH RTQDAY, #1 INH 9 Refills         Prov: ROSITA GREEN         7/19/18       Neb-Levalbuterol (Xopenex) 1.25 Mg/3 Ml Vial.neb      1.25 MG INH RTQ6H, #120 ML 2 Refills         Prov: ROSITA GREEN         4/17/18       Aspirin Chew (Aspirin Chew) 81 Mg Tab.chew      81 MG PO QAM, #30 TAB.CHEW 0 Refills         Reported         9/14/17       Montelukast Sodium (Montelukast*) 10 Mg Tablet      10 MG PO HS, TAB         Reported         9/25/13       Lovastatin (Lovastatin) 40 Mg Tablet      40 MG PO HS         Reported         9/25/13      Current Medications      Current Medications Reviewed 1/2/19            EXAM      VITAL SIGNS:  Reviewed.        NECK:  Supple without tracheal deviation or lymphadenopathy.  No thyromegaly     appreciated.      LYMPHATICS:  No cervical or supraclavicular lymphadenopathy.      HEENT: Pupils are equal, round and reactive to light. There is no scleral     icterus.  Nares patent without hypertrophy of the turbinates. No erythema of the    passages.  TMs are clear bilaterally with good cone of light. The posterior     pharynx is without  lesions or erythema.      RESPIRATORY: Coarse rhonchi that clears with coughing in the bilateral lower     lobes, kyphotic curvature to the thoracic spine.   No wheezes or rales.      Tympanic to percussion.        CARDIOVASCULAR:  Regular rate and rhythm.  No murmurs, gallops or rubs.  No     lower extremity edema.  Equal radial pulses.        GI: Soft, nontender, nondistended, no organomegaly.  Bowel sounds present in all    four quadrants.      MUSCULOSKELETAL:  No joint effusions, erythema or warmth over the major joint     systems. She ambulates with a walker.       SKIN:  No rashes or lesions.      NEUROLOGIC: Cranial nerves II-XII are intact bilaterally.  Moves all extremities    . Ambulates with ease.      PSYCH:  Appropriate mood and affect.      Vtials      Vitals:             Height 4 ft 11 in / 149.86 cm           Weight  166 lbs 8 oz / 75.661675 kg           BSA 1.71 m2           BMI 33.6 kg/m2           Temperature 98.5 F / 36.94 C - Oral           Pulse 104           Respirations 16           Blood Pressure 120/88 Sitting, Right Arm           Pulse Oximetry 92%, room air            REVIEW      Results Reviewed      PCCS Results Reviewed?:  Yes Prev Lab Results, Yes Prev Radiology Results, Yes     Previous [facility] Records      Lab Results      I reviewed October visit note, medications, any imaging available and labs.            Assessment      ASSESSMENT:        1.  Acute exacerbation of bronchiectasis.        2.  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        3.  MAC intolerant to (rifampin/azithromycin/ethambutol) with severe neuropathy.      4.  Neuropathy on gabapentin.      5.  Chronic bronchiectasis with airway clearance issues.      6.   CKD stage 3.      7.  Gastroesophageal reflux disease on chronic omeprazole.        8. Seasonal allergic rhinitis.      9. COPD with overlap of asthma.      10. Former tobacco abuse.      11. Atrial fibrillation.            PLAN:      1.  Patient was admitted to the hospital for acute exacerbation of     bronchiectasis.        2. I have spoken to hospitalist. She will need to continue with vest therapy and    triple inhaler therapy.  I have urged her in the past to switch over to     nebulizers, but she is concerned because this increases her jitteriness and she     does not tolerate it well.      3. Continue with chronic suppression of underlying bronchiectasis exacerbations     with azithromycin 250 mg daily.      4. Atrial fibrillation appears to be chronic, but unsure who is managing. She     will need to see a cardiologist and establish with one while in the hospital.     This was discussed with the patient in detail today prior to her going to the     hospital.  She was accompanied by her relative who  will take her.            Patient Education      Education resources provided:  Yes      Time Spent:  > 50% /Coord Care (hospitalization coordination)            Electronically signed by ROSITA GREEN  12/02/2019 12:34       Disclaimer: Converted document may not contain table formatting or lab diagrams. Please see [employer] for the authenticated document.

## 2021-05-28 NOTE — PROGRESS NOTES
Patient: ANTONY MARTINEZ     Acct: [id number]     Report: #[id number]  UNIT #: [id number]     : 1925    Encounter Date:2019  PRIMARY CARE: DAYDAY MARY DO  ***Signed***  --------------------------------------------------------------------------------------------------------------------  Chief Complaint      Encounter Date      Mar 27, 2019            Primary Care Provider      MARGARITA HOFFMAN            Referring Provider      SELF,REFERRED            [facility]            Patient Complaint      Patient is complaining of      Pt here for 2mo f/u. COPD            VITALS      Height 4 ft 11 in / 149.86 cm      Weight 170 lbs 4 oz / 77.520274 kg      BSA 1.72 m2      BMI 34.4 kg/m2      Temperature 97.4 F / 36.33 C - Temporal      Pulse 81      Respirations 16      Blood Pressure 136/70 Sitting, Left Arm      Pulse Oximetry 91%, Room air      Initial Exhaled Nitrous Oxide      Exhaled Nitrous Oxide Results:  7            HPI      The patient presents for follow up regarding chronic hypoxemic respiratory     failure, COPD, history of AUSTEN infection, not able to tolerate antibiotic     therapy. She has been having issues with sick sinus syndrome, status post     pacemaker placement and was hospitalized since I last saw her with the     cardiology service. She is compliant with Brovana and Pulmicort nebulizers twice    daily and Spiriva Respimat.  She feels like she has good days where she is able     to the store, get herself dressed and get up out of the bed.  Other days, she is    short of breath that she basically sits on the cough or in the bed all day. She     is asking for a portable oxygen concentrator. She would like to be more mobile     and active and get out this spring and summer in her garden, but is right now     unable to do so secondary to the oxygen tanks that has to carry around.  She     does have a chronic cough that is productive of thick mucus, but she is using     deep cough techniques  and flutter valve to help with expectoration. She has     chronic lower extremity swelling, but it is at baseline for her. She is wheezing    intermittently which improves with albuterol.            Review of systems as noted.            Past medical, family, social and surgical history reviewed and I agree with that    as documented in the medical chart.            Medications reviewed and updated in the chart.            ROS      Constitutional:  Denies: Fatigue, Fever, Weight gain, Weight loss, Chills, Inso    mnia, Other      Respiratory/Breathing:  Complains of: Wheezing, Cough; Denies: Shortness of air,    Hemoptysis, Pleuritic pain, Other      Endocrine:  Denies: Polydipsia, Polyuria, Heat/cold intolerance, Abnorml     menstrual pattern, Diabetes, Other      Eyes:  Denies: Blurred vision, Vision Changes, Other      Ears, nose, mouth, throat:  Denies: Congestion, Dysphagia, Hearing Changes, Nose    Bleeding, Nasal Discharge, Throat pain, Tinnitus, Other      Cardiovascular:  Denies: Chest Pain, Exertional dyspnea, Peripheral Edema,     Palpitations, Syncope, Wake up Gasping for air, Orthopnea, Tachycardia, Other      Gastrointestinal:  Denies: Abdominal pain/cramping, Bloody stools, Constipation,    Diarrhea, Melena, Nausea, Vomiting, Other      Genitourinary:  Denies: Dysuria, Urinary frequency, Incontinence, Hematuria,     Urgency, Other      Musculoskeletal:  Denies: Joint Pain, Joint Stiffness, Joint Swelling, Myalgias,    Other      Hematologic/lymphatic:  DENIES: Lymphadenopathy, Bruising, Bleeding tendencies,     Other      Neurologic:  Denies: Headache, Numbness, Weakness, Seizures, Other      Psychiatric:  Denies: Anxiety, Appropriate Effect, Depression, Other      Sleep:  No: Excessive daytime sleep, Morning Headache?, Snoring, Insomnia?, Stop    breathing at sleep?, Other      Integumentary:  Denies: Rash, Dry skin, Skin Warm to Touch, Other            FAMILY/SOCIAL/MEDICAL HX      Surgical History:   Yes: Appendectomy, Cholecystectomy; No: AAA Repair, Abdominal    Surgery, Angioplasty, Back Surgery, Bladder Surgery, Bowel Surgery, Breast     Surgery, CABG, Carotid Stenosis, Ear Surgery, Eye Surgery, Head Surgery, Hernia     Surgery, Kidney Surgery, Nose Surgery, Oral Surgery, Orthopedic Surgery, Pro    statectomy, Rectal Surgery, Spinal Surgery, Testicular Surgery, Throat Surgery,     Valve Replacement, Vascular Surgery, Other Surgeries      Stroke - Family Hx:  Brother, Aunt      Heart - Family Hx:  Mother, Brother      Diabetes - Family Hx:  Mother, Brother, Aunt      Is Father Still Living?:  No      Is Mother Still Living?:  No       Family History:  Yes      Social History:  No Tobacco Use, No Alcohol Use, No Recreational Drug use      Smoking status:  Former smoker (2 ppd x20 years )      Hysterectomy:  Yes      Anticoagulation Therapy:  No      Antibiotic Prophylaxis:  No      Medical History:  Yes: Arthritis (GENERAL), Asthma (ASTHAMATIC BRONCHITIS),     Chronic Bronchitis/COPD, Congestive Heart Failu, Diabetes (TYPE II ), Gout,     Hiatal Hernia, High Blood Pressure, Shortness Of Breath (COPD, PNEUMONIA); No:     Alcoholism, Allergies, Anemia, Blood Disease, Broken Bones, Cataracts, Chemical     Dependency, Chemotherapy/Cancer, Emphysema, Chronic Liver Disease, Colon     Trouble, Colitis, Diverticulitis, Deafness or Ringing Ears, Convulsions,     Depression, Anxiety, Bipolar Disorder, Epilepsy, Seizures, Forgetfullness,     Glaucoma, Gall Stones, Head Injury, Heart Attack, Heart Murmur,     Hemorrhoids/Rectal Prob (REFLUX), Hepatitis, High Cholesterol, HIV (Do not ask -    volu, Jaundice, Kidney or Bladder Disease, Kidney Stones, Migrane Headaches, M    itral Valve Prolapse, Night sweats, Phlebitis, Psychiatric Care, Reflux Disease,    Rheumatic Fever, Sexually Transmitted Dis, Sinus Trouble, Skin     Disease/Psoriais/Ecz, Stroke, Thyroid Problem, Tuberculosis or Pos TB Te,     Miscellaneous  Medical/oth      Psychiatric History      none            PREVENTION      Hx Influenza Vaccination:  Yes      Date Influenza Vaccine Given:  Nov 1, 2018      Influenza Vaccine Declined:  No      2 or More Falls Past Year?:  No      Fall Past Year with Injury?:  No      Hx Pneumococcal Vaccination:  Yes      Encouraged to follow-up with:  PCP regarding preventative exams.      Chart initiated by      Oumou Stewart MA            ALLERGIES/MEDICATIONS      Allergies:        Coded Allergies:             *No Known Allergies (Verified  Allergy, Mild, 3/27/19)      Medications    Last Reconciled on 3/27/19 11:21 by ROSITA GREEN DO      ([facility])   No Conflict Check               Reported         3/27/19       amLODIPine (amLODIPine) 2.5 Mg Tablet      2.5 MG PO QDAY, #30 TAB 0 Refills         Prov: EL-Shiekh,Reda         2/9/19       Furosemide* (Lasix*) 40 Mg Tablet      40 MG PO BID@09,17, #60 TAB 0 Refills         Prov: EL-Shiekh,Reda         2/9/19       Insulin Lispro (HumaLOG VIAL*) 100 Units/Ml Vial      0 SUBQ QID INSULIN, #1 VIAL 0 Refills         Reported         2/6/19       Nebulizer Accessories (Nebulizer Kit PURA) 1 Each Kit      EACH XX ONCE, #1 0 Refills         Prov: ROSITA GREEN         1/25/19       Acetaminophen Es (Tylenol Extra Strength) 500 Mg Tablet      1000 MG PO Q4H PRN for PAIN, #100 TAB 0 Refills         Reported         12/6/18       (Oxygen) (OXYGEN)   No Conflict Check      2 L NS PRN PRN for SHORTNESS OF BREATH         Reported         12/6/18       Allopurinol (Allopurinol) 100 Mg Tablet      100 MG PO BID, #30 TAB 0 Refills         Reported         10/2/18       Neb-Levalbuterol (Xopenex) 1.25 Mg/3 Ml Vial.neb      1.25 MG INH RTQ6H, #120 ML 2 Refills         Prov: ROSITA GREEN         4/17/18       Aspirin Chew (Aspirin Chew) 81 Mg Tab.chew      81 MG PO QAM, #30 TAB.CHEW 0 Refills         Reported         9/14/17       Montelukast Sodium (Montelukast*) 10 Mg Tablet      10 MG PO  HS, TAB         Reported         9/25/13      Current Medications      Current Medications Reviewed 3/27/19            EXAM      VITAL SIGNS:  Reviewed.        GENERAL: Elderly female, nondyspneic with conversation.        NECK:  Supple without tracheal deviation or lymphadenopathy.  No thyromegaly     appreciated.      LYMPHATICS:  No cervical or supraclavicular lymphadenopathy.      HEENT: Pupils are equal, round and reactive to light. There is no scleral     icterus.  Nares patent without hypertrophy of the turbinates. No erythema of the     passages.  TMs are clear bilaterally with good cone of light. The posterior     pharynx is without  lesions or erythema.      RESPIRATORY:  Coarse rhonchi bilaterally.  There is some expiratory wheezes,     diminished breath sounds at the bases.        CARDIOVASCULAR:  Regular rate and rhythm.  No murmurs, gallops or rubs.  No     lower extremity edema.  Equal radial pulses.        GI: Soft, nontender, nondistended, no organomegaly.  Bowel sounds present in all     four quadrants.      MUSCULOSKELETAL:  No joint effusions, erythema or warmth over the major joint     systems.      SKIN:  No rashes or lesions.      NEUROLOGIC: Cranial nerves II-XII are intact bilaterally.  Moves all     extremities. Ambulates with ease.      PSYCH:  Appropriate mood and affect.      Vitals      Vitals:             Height 4 ft 11 in / 149.86 cm           Weight 170 lbs 4 oz / 77.381267 kg           BSA 1.72 m2           BMI 34.4 kg/m2           Temperature 97.4 F / 36.33 C - Temporal           Pulse 81           Respirations 16           Blood Pressure 136/70 Sitting, Left Arm           Pulse Oximetry 91%, Room air            REVIEW      Results Reviewed      PCCS Results Reviewed?:  Yes Prev Lab Results, Yes Prev Radiology Results, Yes     Previous [facility] Records      Lab Results      Reviewed last clinic note.  Reviewed hospital discharge summary.  Reviewed chest     x-ray from 02/06/2019.             Assessment      Notes      New Medications      * (tribecka):       Discontinued Medications      * ARFORMOTEROL TARTRATE (Brovana) 15 MCG/2 ML VIAL.NEB: 15 MCG INH RTBID #60         Instructions: dx:j44.9 npi:[id number]      * Neb-Budesonide (Pulmicort) 0.5 MG/2 ML AMPUL.NEB: 0.5 MG INH BID #60         Instructions: dx:j44.9 npi:[id number]      * Losartan Potassium (Losartan*) 25 MG TABLET: 25 MG PO QDAY #30      ASSESSMENT:       1.  Bronchiectasis without acute exacerbation.        2.  Severe COPD/asthma overlap syndrome.      3.  Chronic hypoxemic respiratory failure on supplemental oxygen.        4.  History of AUSTEN infection, failed triple antibiotic therapy secondary to side     effects.      5.  Severe gastroesophageal reflux disease without esophagitis.      6.  CKD stage 3.      7.  Neuropathy of the lower extremities.      8.  Seasonal allergic rhinitis.      9.  Sick sinus syndrome, status post pacemaker placement, chronic diastolic     congestive heart failure.              PLAN:        1.  This patient would benefit from having a portable oxygen concentrator as she     would like to become more active and go to [employer] and get out in her     garden and is currently limited in her ability to do so secondary to the heavy     tanks and her fear of them running out while she is out and about.      2.  Continue Brovana and Pulmicort twice daily through [facility].      3. Continue Spiriva Respimat.      4. Continue flutter valve for airway clearance.      5. Follow up with Dr. Rothman, her cardiology for history of sick sinus     syndrome and diastolic congestive heart failure.            Patient Education      Patient Education Provided:  COPD, How to use a Nebulizer                 Disclaimer: Converted document may not contain table formatting or lab diagrams. Please see [employer] [facility] for the authenticated document.

## 2021-05-28 NOTE — PROGRESS NOTES
Patient: ANTONY MARTINEZ     Acct: [id number]     Report: #[id number]  UNIT #: [id number]     : 1925    Encounter Date:2018  PRIMARY CARE: DAYDAY MARY DO  ***Signed***  --------------------------------------------------------------------------------------------------------------------  Chief Complaint      Encounter Date      2018            Primary Care Provider      Sumit Moreno            Referring Provider      SELF,REFERRED PATIENT            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 She has chronic     bronchiectasis and presents today for follow up.             The patient states that she had to stop taking her daily Azithromycin for     chronic suppression as it caused her to have tremors. She had stopped the     rifampin and ethambutol in April secondary to severe peripheral neuropathy,     nausea and dizziness. These have all since improved dramatically since going     off the medication. She continues to have a chronic cough that is productive of     thick white phlegm. She uses a flutter valve and she was intolerant to the vest     as it caused her to feel like it was going to break her bones.  She is able to     expel a fair amount of mucous after she uses the flutter valve and she does     this approximately 3-4 times daily.  She uses albuterol nebulizers before using     the flutter valve and  this helps. I started her on Anoro and arnuity inhalers     for history of chronic obstructive pulmonary disease and she is doing very well     with these and is requesting refills today. She is looking forward to     travelling to [place] which is where she used to live to visit some family and     is wanting to take a flight. She is asking about taking oxygen on the flight.             Review of Systems is notable for shortness of breath, intermittently and     wheezing as well as chronic cough as mentioned above. Neurology is positive for     tremor in the right arm.             Past family, medical, surgical and social histories were all reviewed by myself     with the patient and are unchanged.             Medications were reviewed by myself with the patient and updated in the chart.      She is no longer on Azithromycin so this will be removed from her chart.            ROS      Constitutional:  Denies: Fatigue, Fever, Weight gain, Weight loss, Chills,     Insomnia, Other      Respiratory/Breathing:  Complains of: Shortness of air, Wheezing, Cough, Denies    : Hemoptysis, Pleuritic pain, Other      Endocrine:  Denies: Polydipsia, Polyuria, Heat/cold intolerance, Abnorml     menstrual pattern, Diabetes, Other      Eyes:  Denies: Blurred vision, Vision Changes, Other      Ears, nose, mouth, throat:  Denies: Congestion, Dysphagia, Hearing Changes,     Nose Bleeding, Nasal Discharge, Throat pain, Tinnitus, Other      Cardiovascular:  Denies: Chest Pain, Exertional dyspnea, Peripheral Edema,     Palpitations, Syncope, Wake up Gasping for air, Orthopnea, Tachycardia, Other      Gastrointestinal:  Denies: Abdominal pain/cramping, Bloody stools, Constipation    , Diarrhea, Melena, Nausea, Vomiting, Other      Genitourinary:  Denies: Dysuria, Urinary frequency, Incontinence, Hematuria,     Urgency, Other      Musculoskeletal:  Denies: Joint Pain, Joint Stiffness, Joint Swelling, Myalgias    , Other       Hematologic/lymphatic:  DENIES: Lymphadenopathy, Bruising, Bleeding tendencies,     Other      Neurologic:  Complains of: Other (tremor in right arm), Denies: Headache,     Numbness, Weakness, Seizures      Psychiatric:  Denies: Anxiety, Appropriate Effect, Depression, Other      Sleep:  No: Excessive daytime sleep, Morning Headache?, Snoring, Insomnia?,     Stop breathing at sleep?, Other      Integumentary:  Denies: Rash, Dry skin, Skin Warm to Touch, Other            FAMILY/SOCIAL/MEDICAL HX      Surgical History:  Yes: Appendectomy, Cholecystectomy, No: AAA Repair,     Abdominal Surgery, Angioplasty, Back Surgery, Bladder Surgery, Bowel Surgery,     Breast Surgery, CABG, Carotid Stenosis, Ear Surgery, Eye Surgery, Head Surgery,     Hernia Surgery, Kidney Surgery, Nose Surgery, Oral Surgery, Orthopedic Surgery,     Prostatectomy, Rectal Surgery, Spinal Surgery, Testicular Surgery, Throat     Surgery, Valve Replacement, Vascular Surgery, Other Surgeries      Stroke - Family Hx:  Brother, Aunt      Heart - Family Hx:  Mother, Brother      Diabetes - Family Hx:  Mother, Brother, Aunt      Is Father Still Living?:  No      Is Mother Still Living?:  No      Social History:  No Tobacco Use, No Alcohol Use, No Recreational Drug use      Smoking status:  Former smoker (2 ppd x20 years )      Hysterectomy:  Yes      Anticoagulation Therapy:  No      Antibiotic Prophylaxis:  No      Medical History:  Yes: Arthritis, Asthma (ASTHAMATIC BRONCHITIS), Chronic     Bronchitis/COPD, Congestive Heart Failu, Diabetes (TYPE II ), Gout, Hiatal     Hernia, High Blood Pressure, Shortness Of Breath (COPD, PNEUMONIA IN PAST),     Miscellaneous Medical/oth (mycobacterium avium and bronchiectasis), No:     Alcoholism, Allergies, Anemia, Blood Disease, Broken Bones, Cataracts, Chemical     Dependency, Chemotherapy/Cancer, Emphysema, Chronic Liver Disease, Colon Trouble    , Colitis, Diverticulitis, Deafness or Ringing Ears, Convulsions,  Depression,     Anxiety, Bipolar Disorder, Epilepsy, Seizures, Forgetfullness, Glaucoma, Gall     Stones, Head Injury, Heart Attack, Heart Murmur, Hemorrhoids/Rectal Prob (REFLUX    ), Hepatitis, High Cholesterol, HIV (Do not ask - volu, Jaundice, Kidney or     Bladder Disease, Kidney Stones, Migrane Headaches, Mitral Valve Prolapse, Night     sweats, Phlebitis, Psychiatric Care, Reflux Disease, Rheumatic Fever, Sexually     Transmitted Dis, Sinus Trouble, Skin Disease/Psoriais/Ecz, Stroke, Thyroid     Problem, Tuberculosis or Pos TB Te            PREVENTION      Hx Influenza Vaccination:  No (PT STATES SHE DOESN'T TAKE THEM)      Date Influenza Vaccine Given:  Nov 1, 2017      Influenza Vaccine Declined:  No      2 or More Falls Past Year?:  No      Fall Past Year with Injury?:  No      Hx Pneumococcal Vaccination:  No (PT STATES THAT SHE DOESN'T TAKE THEM)      Encouraged to follow-up with:  PCP regarding preventative exams.      Chart initiated by      Lynn Garcia ma            ALLERGIES/MEDICATIONS      Allergies:        Coded Allergies:             *No Known Allergies (Verified  Allergy, Mild, 7/19/18)      Medications    Last Reconciled on 7/19/18 13:20 by ROSITA GREEN DO      Neb-Levalbuterol (Xopenex) 1.25 Mg/3 Ml Vial.neb      1.25 MG INH RTQ6H, #120 ML 2 Refills         Prov: ROSITA GREEN         4/17/18       Azithromycin (Zithromax) 250 Mg Tablet      250 MG PO QDAY, #30 TAB 6 Refills         Prov: ROSITA GREEN         4/17/18       Fluticasone Furoate (Arnuity Ellipta) 100 Mcg Blst.w.dev      1 PUFF INH RTQDAY for 30 Days, #1 INH 3 Refills         Prov: ROSITA GREEN         4/17/18       Umeclidinium/Vilanterol 62.5-25 Mcg Inh (Anoro Ellipta 62.5-25 Mcg Inh) 1 Each     Blst.w.dev      1 PUFF INH QDAY, #3 INH 3 Refills         Prov: ROSITA GREEN         4/17/18       Omeprazole (Omeprazole*) 40 Mg Capsule      40 MG PO BID for 30 Days, #60 CAP 2 Refills         Prov: ROSITA GREEN         1/30/18        NEB-Albuterol Sulf (Albuterol) 2.5 Mg/0.5 Ml Vial.neb      2.5 MG INH QID Y for DYSPNEA/WHEEZING, #120 NEB 5 Refills         Prov: LAINEY VELASQUEZ         1/11/18       Aspirin (Aspirin*) 81 Mg Tab.chew      81 MG PO QAM, #30 TAB.CHEW 0 Refills         Reported         9/14/17       Furosemide* (Lasix*) 40 Mg Tablet      40 MG PO BID@09,17 Y for SWELLING, #60 TAB 0 Refills         Reported         9/14/17       Oxygen (OXYGEN)  Gas               Reported         10/19/16       Montelukast Sodium (Montelukast*) 10 Mg Tablet      10 MG PO HS, TAB         Reported         9/25/13       Lovastatin (Lovastatin) 40 Mg Tablet      40 MG PO HS         Reported         9/25/13      Current Medications      Current Medications Reviewed 7/19/18            EXAM      Vital signs reviewed. 91% on room air at rest.       GENERAL:  Pleasant elderly female speaking in full sentences on room air.       HEENT: Pupils are equally round and reactive to light and accommodation.      Extraocular muscles intact bilateral. Nares patent without hypertrophy of the     turbinates.  TM's are clear bilaterally. Small oropharynx without lesions or     erythema.       NECK:  Supple without tracheal deviation or lymphadenopathy. No thyromegaly     appreciated.       LYMPHATICS: No cervical or supraclavicular lymphadenopathy.       RESPIRATORY: There is scattered expiratory wheezes, diminished breath sounds in     the right lower lobe with rhonchi, tympanic to percussion bilaterally, equal     rise and fall of the chest.       CVS:  Regular rate and rhythm, no murmurs, rubs or gallops, no lower extremity     edema, , equal radial pulses.      GI: Abdomen soft, nontender, nondistended, no hepatomegaly appreciated.  Bowel     sounds present in all 4 quadrants.       MUSCULOSKELETAL: No erythema, warmth or fluctuance over the major joints     including the knees, ankles, wrists and elbows.        SKIN: No rashes or lesions.       NEUROLOGICAL: Alert  and oriented X 3.  No focal deficits on exam. Cranial     nerves II-XII intact bilaterally. The patient did not have any cogwheel     rigidity noted.       PSYCH: Patient has appropriate mood and affect.      Vitals      Vitals:             Height 5 ft 0 in / 152.4 cm           Weight 174 lbs 8 oz / 79.063716 kg           BSA 1.87 m2           BMI 34.1 kg/m2           Temperature 97.4 F / 36.33 C - Oral           Pulse 78           Respirations 16           Blood Pressure 144/65 Sitting, Left Arm           Pulse Oximetry 91%, roomair            REVIEW      Results Reviewed      PCCS Results Reviewed?:  Yes Prev Lab Results, Yes Prev Radiology Results, Yes     Previous Mecial Records      PFT Results      [id number]  [id number]                                       [facility]                          [employer]                            [place] [id number]               __________________________________________________________________________             Patient Name:                   Attending Physician:      Carrington Ledbetter M.D.             Patient Visit # MR #            Admit Date  Disch Date     Location      [id number]    [id number]      03/20/2017                 [facility]- -             Date of Birth      02/13/1925      __________________________________________________________________________      0821 - DIAGNOSTIC REPORT             PULMONARY FUNCTION TEST             DATE OF SERVICE:      03/20/17.             FAMILY DOCTOR:      Demetrice Chaney M.D.             SPIROMETRY:      Spirometry shows mild obstructive defect.      FEV1/FVC ratio 56%.      FEV1 is 0.75 liters, 89% of predicted.      FVC is 1.33 liters, 122% of predicted.             BRONCHODILATOR RESPONSE:      There is no significant response to bronchodilator administration.  FEV1      increased by 13%, though increase was only from 0.75  liters to 0.85 liters.      FVC did not significantly change.             LUNG VOLUMES:      Lung volumes show hyperinflation and air trapping.      Total lung capacity is 6.50 liters, 172% of predicted.      Residual volume is 5.08 liters, 246% of predicted.             DIFFUSION CAPACITY:      Diffusion capacity is severely decreased, 31% of predicted.             FLOW VOLUME LOOP:      Flow volume loop is compatible with obstructive process.             CONCLUSION:      Low FEV1/FVC with normal FEV1 and FVC with air trapping, hyperinflation and      low diffusion capacity.  It suggests mild obstructive airway disease, likely      emphysema.  Significantly disproportionately low diffusion capacity could      suggest underlying secondary cause, like restrictive lung disease or some      other pulmonary parenchymal pathology.  Please correlate clinically.             To be electronically signed in eyefactive      [id number] ZAKI MARCUM M.D.             NK:[email or web address]      D:  03/28/2017 12:40      T:  03/29/2017 09:34      #[id number]             Until signed, this is an unconfirmed preliminary report that may contain      errors and is subject to change.                   03/30/17 0747  <Electronically signed by Zaki Marcum MD>            Assessment      Notes      New Medications      * Fluticasone Furoate (Arnuity Ellipta) 200 MCG BLST.W.DEV: 1 PUFF INH RTQDAY #1      Discontinued Medications      * Fluticasone Furoate (Arnuity Ellipta) 100 MCG BLST.W.DEV: 1 PUFF INH RTQDAY     30 Days #1      * Azithromycin (Zithromax) 250 MG TABLET: 250 MG PO QDAY #30      ASSESSMENT:        The patient is a pleasant 89 year old female with a history of COPD/asthma     overlap, chronic bronchiectasis,  emphysema, chronic hypoxemic respiratory     failure, diastolic congestive heart failure, stage 3 CKD as well as chronic     mycobacterium avium complex. She is here today for follow up.            1.  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  COPD/asthma/emphysema, stable on current therapy.       3.  Chronic hypoxemic respiratory failure.      4.  Chronic bronchiectasis.      5.  CKD stage 3.      6.  Gastroesophageal reflux disease controlled on current therapy.       7.  Chronic diastolic congestive heart failure appears euvolemic today.       8. Neuropathy of the lower extremities.             PLAN:      1.  I encouraged the patient to continue using her flutter valve for airway     clearance and albuterol nebs as needed. In regards to her inhaler therapy, I     have given her more samples of Anoro. I have actually increased the arnuity to     200 mcg daily as this may give her better control of her symptoms.       2. Continue Omeprazole 40 mg twice daily.       3. In regards to her flying, we will send a letter to the company that she     chooses to fly with telling them she needs to be on 2 liters of oxygen at all     times and they should have this arranged and ready for her at the time of her     flight.       4. I counseled the patient on the signs of bronchiectasis exacerbation     including a change in her sputum quality, character or quantity as well as     increasing fatigue, wheezing or coughing. If these happen I would like to get a     sputum sample and start antibiotics and steroids immediately.       5. I will see the patient back in early October so she can get a flu vaccine.     The rest of her chronic medical conditions per her primary care provider.            Patient Education      Patient Education Provided:  Influenza      Time Spent:  > 50% /Coord Care      Other Education:  bronchiectasis, flying safety with oxygen                 Disclaimer: Converted document may not contain table formatting or lab diagrams. Please see [employer] for the authenticated document.

## 2021-05-28 NOTE — PROGRESS NOTES
Patient: ANTONY MARTINEZ     Acct: [id number]     Report: #[id number]  UNIT #: [id number]     : 1925    Encounter Date:2018  PRIMARY CARE: DAYDAY MARY DO  ***Signed***  --------------------------------------------------------------------------------------------------------------------  Chief Complaint      Encounter Date      2018            Referring Provider      SELF,REFERRED PATIENT      Referring Provider not in look:        [facility]            Patient Complaint      Patient is complaining of      1 Mth Fu/Sputum Results            VITALS      Height 5 ft  / 152.4 cm      Weight 172 lbs  / 78.320146 kg      BSA 1.85 m2      BMI 33.6 kg/m2      Temperature 98.3 F / 36.83 C - Oral      Pulse 98      Respirations 14      Blood Pressure 158/76 Sitting, Left Arm      Pulse Oximetry 84%, roomair@rest      Comment: Oxygen improved to 95% with 2 liters oxygen      Exhaled Nitrous Oxide Testin            HPI      The patient is a 89 year old nonsmoker with a history of mycobacterium avium     complex who was started on rifampin, ethambutol and azithromycin during     hospitalization in January. She presents today for follow up.            She was last seen in the office on 2018 at which time she was started on     omeprazole 40 mg twice a day for untreated gastroesophageal reflux disease and     was given samples of Arnuity and Anoro.  She is here today with her sister and     states that since started the triple antibiotic therapy that she has had     dizziness, nausea, upset stomach, tingling and burning in her feet and joint     pains.  She is wondering if this is related to any of the medications she is     on.  She is also having vision changes such as blurred vision and was asked to     follow up with her ophthalmologist, but has yet to do so.  She has followed up     with her internist, Dr. Chaney in the meantime and had LFTs performed which     were within normal limits.             She is compliant with Anoro and Arnuity daily and states that this has helped     her breathing. She is also on 2 liters of oxygen therapy at all times, however     does not take it when she leaves the house secondary to how big the tank is and     her inability to carry it around.  Her breathing is stable. She gets short of     breath with heavy exertion and has some white phlegm production occasionally.      Her biggest complaint today is the pins and needles and burning sensation in     her feet and pain when walking.            Past medical, family, social and surgical history updated in the chart.            ROS      Constitutional:  Denies: Fatigue, Fever, Weight gain, Weight loss, Chills,     Insomnia, Other      Respiratory/Breathing:  Complains of: Shortness of air, Denies: Wheezing, Cough    , Hemoptysis, Pleuritic pain, Other      Endocrine:  Denies: Polydipsia, Polyuria, Heat/cold intolerance, Abnorml     menstrual pattern, Diabetes, Other      Eyes:  Complains of: Blurred vision, Vision Changes, Denies: Other      Ears, nose, mouth, throat:  Denies: Mouth lesions, Thrush, Throat pain,     Hoarseness, Allergies/Hay Fever, Post Nasal Drip, Headaches, Recent Head Injury    , Nose Bleeding, Neck Stiffness, Thyroid Mass, Hearing Loss, Ear Fullness, Dry     Mouth, Nasal or Sinus Pain, Dry Lips, Nasal discharge, Nasal congestion, Other      Cardiovascular:  Complains of: Dyspnea on Exertion, Denies: Palpitations,     Syncope, Claudication, Chest Pain, Wake up Gasping for air, Leg Swelling,     Irregular Heart Rate, Cyanosis, Other      Gastrointestinal:  Complains of: Nausea, Reflux/Heartburn (improved), Denies:     Constipation, Diarrhea, Abdominal pain, Vomiting, Difficulty Swallowing,     Dysphagia, Jaundice, Bloating, Melena, Bloody stools, Other      Genitourinary:  Denies: Urinary frequency, Incontinence, Hematuria, Urgency,     Nocturia, Dysuria, Testicular problems, Other      Musculoskeletal:   Complains of: Joint Pain (bilateral shoulders), Joint     Swelling (right ankle), Denies: Joint Stiffness, Myalgias, Other      Hematologic/lymphatic:  DENIES: Lymphadenopathy, Bruising, Bleeding tendencies,     Other      Neurological:  Complains of: Numbness, Other (burning/tingling sensation in her     feet), Denies: Headache, Weakness, Seizures      Psychiatric:  Denies: Anxiety, Appropriate Effect, Depression, Other      Sleep:  No: Excessive daytime sleep, Morning Headache?, Snoring, Insomnia?,     Stop breathing at sleep?, Other      Integumentary:  Denies: Rash, Dry skin, Skin Warm to Touch, Other      Immunologic/Allergic:  Denies: Latex allergy, Seasonal allergies, Asthma,     Urticaria, Eczema, Other      Immunization status:  No: Up to date            FAMILY/SOCIAL/MEDICAL HX      Surgical History:  Yes: Appendectomy, Cholecystectomy, No: AAA Repair,     Abdominal Surgery, Angioplasty, Back Surgery, Bladder Surgery, Bowel Surgery,     Breast Surgery, CABG, Carotid Stenosis, Ear Surgery, Eye Surgery, Head Surgery,     Hernia Surgery, Kidney Surgery, Nose Surgery, Oral Surgery, Orthopedic Surgery,     Prostatectomy, Rectal Surgery, Spinal Surgery, Testicular Surgery, Throat     Surgery, Valve Replacement, Vascular Surgery, Other Surgeries      Stroke - Family Hx:  Brother, Aunt      Heart - Family Hx:  Mother, Brother      Diabetes - Family Hx:  Mother, Brother, Aunt      Is Father Still Living?:  No      Is Mother Still Living?:  No      Social History:  No Tobacco Use, No Alcohol Use, No Recreational Drug use      Smoking status:  Former smoker (2 ppd x20 years )      Hysterectomy:  Yes      Anticoagulation Therapy:  No      Antibiotic Prophylaxis:  No      Medical History:  Yes: Arthritis, Asthma (ASTHAMATIC BRONCHITIS), Chronic     Bronchitis/COPD, Congestive Heart Failu, Diabetes (TYPE II ), Gout, Hiatal     Hernia, High Blood Pressure, Shortness Of Breath (COPD, PNEUMONIA IN PAST), No:      Alcoholism, Allergies, Anemia, Blood Disease, Broken Bones, Cataracts, Chemical     Dependency, Chemotherapy/Cancer, Emphysema, Chronic Liver Disease, Colon Trouble    , Colitis, Diverticulitis, Deafness or Ringing Ears, Convulsions, Depression,     Anxiety, Bipolar Disorder, Epilepsy, Seizures, Forgetfullness, Glaucoma, Gall     Stones, Head Injury, Heart Attack, Heart Murmur, Hemorrhoids/Rectal Prob (REFLUX    ), Hepatitis, High Cholesterol, HIV (Do not ask - volu, Jaundice, Kidney or     Bladder Disease, Kidney Stones, Migrane Headaches, Mitral Valve Prolapse, Night     sweats, Phlebitis, Psychiatric Care, Reflux Disease, Rheumatic Fever, Sexually     Transmitted Dis, Sinus Trouble, Skin Disease/Psoriais/Ecz, Stroke, Thyroid     Problem, Tuberculosis or Pos TB Te, Miscellaneous Medical/oth      Psychiatric History      None            Hx Influenza Vaccination:  No (PT STATES SHE DOESN'T TAKE THEM)      Date Influenza Vaccine Given:  Nov 1, 2017      Influenza Vaccine Declined:  No      2 or More Falls Past Year?:  No      Fall Past Year with Injury?:  No      Hx Pneumococcal Vaccination:  No (PT STATES THAT SHE DOESN'T TAKE THEM)      Encouraged to follow-up with:  PCP regarding preventative exams.      Chart initiated by      mindi dixon ma            ALLERGIES/MEDICATIONS      Allergies:        Coded Allergies:             *No Known Allergies (Verified  Allergy, Mild, 2/28/18)      Medications    Last Reconciled on 2/28/18 14:30 by ROSITA GREEN DO      Rifampin (Rifampin) 300 Mg Cap      600 MG PO MOWEFR, #30 CAP         Prov: ROSITA GREEN         2/6/18       Ethambutol (Ethambutol) 400 Mg Tablet      2000 MG PO MOWEFR, #60 TAB         Prov: ROSITA GREEN         2/6/18       Azithromycin (Azithromycin) 250 Mg Tablet      500 MG PO MoWeFr@0900, #30 TAB 11 Refills         Prov: ROSITA GREEN         2/6/18       Ethambutol (Ethambutol) 400 Mg Tablet      2000 MG PO MoWeFr@0900, #60 TAB 11 Refills          Prov: ROSITA GREEN         2/6/18       Neb-NaCl 3% (Sodium Chloride 3% Neb) 4 Ml Vial.neb      4 ML INH RTBID for 30 Days, #60 NEB         Prov: ROSITA GREEN         2/1/18       predniSONE* (Deltasone*) 10 Mg Tablet      10 MG PO QDAY for 10 Days, #10 TAB 0 Refills         Prov: ROSITA GREEN         1/30/18       Fluticasone Furoate (Arnuity Ellipta) 100 Mcg Blst.w.dev      1 PUFF INH RTQDAY for 30 Days, #1 INH 3 Refills         Prov: ROSITA GREEN         1/30/18       Omeprazole (Omeprazole*) 40 Mg Capsule      40 MG PO BID for 30 Days, #60 CAP 2 Refills         Prov: ROSITA GREEN         1/30/18       Albuterol/Ipratropium (Duoneb) 3 Ml Ampul.neb      3 ML INH RTQ4H for 30 Days, #180 NEB 3 Refills         Prov: Zaki Marcum         1/17/18       Azithromycin (Azithromycin) 250 Mg Tablet      500 MG PO MOWEFR, #24 TAB         Prov: TrixieZaki         1/15/18       NEB-Albuterol Sulf (Albuterol) 2.5 Mg/0.5 Ml Vial.neb      2.5 MG INH QID Y for DYSPNEA/WHEEZING, #120 NEB 5 Refills         Prov: LAINEY VELASQUEZ         1/11/18       Rifampin (Rifadin) 300 Mg Cap      600 MG PO MoWeFr@0700, #30 CAP 11 Refills         Prov: LAINEY VELASQUEZ         1/11/18       Beclomethasone Dipropionate (QVAR 80 Mcg) 8.7 Gm Aer.w.adap      2 PUFFS INH RTBID, #1 INH         Reported         1/6/18       Umeclidinium/Vilanterol 62.5-25 Mcg Inh (Anoro Ellipta 62.5-25 Mcg Inh) 1 Each     Blst.w.dev      1 PUFF INH QDAY, #3 INH 3 Refills         Prov: Zaki Marcum         11/22/17       Aspirin (Aspirin*) 81 Mg Tab.chew      81 MG PO QAM, #30 TAB.CHEW 0 Refills         Reported         9/14/17       Furosemide* (Lasix*) 40 Mg Tablet      40 MG PO BID@09,17 Y for SWELLING, #60 TAB 0 Refills         Reported         9/14/17       Oxygen (OXYGEN)  Gas               Reported         10/19/16       Montelukast Sodium (Montelukast*) 10 Mg Tablet      10 MG PO HS, TAB         Reported         9/25/13       Lovastatin (Lovastatin) 40 Mg Tablet       40 MG PO HS         Reported         9/25/13      Current Medications      Current Medications Reviewed 2/28/18            EXAM      VITAL SIGNS:  Reviewed.        GENERAL: Elderly female, wearing supplemental oxygen, speaking in full sentences    , in no acute distress.        NECK:  Supple without tracheal deviation or lymphadenopathy.  No thyromegaly     appreciated.      LYMPHATICS:  No cervical or supraclavicular lymphadenopathy.      HEENT: Pupils are equal, round and reactive to light. There is no scleral     icterus.  Nares patent without hypertrophy of the turbinates. No erythema of     the passages.  TMs are clear bilaterally with good cone of light. The posterior     pharynx is without  lesions or erythema.      RESPIRATORY:  Clear to auscultation bilaterally.  No wheezes, rales or rhonchi.      Tympanic to percussion.        CARDIOVASCULAR:  Regular rate and rhythm.  No murmurs, gallops or rubs.  No     lower extremity edema.  Equal radial pulses.        GI: Soft, nontender, nondistended, no organomegaly.  Bowel sounds present in     all four quadrants.      MUSCULOSKELETAL: The right ankle is edematous with restricted range of motion     to eversion. There was pain to palpation over the posterior longitudinal     ligament.  Full sensation to touch in bilateral feet.  There was evidence of     onychomycosis of the toenails.        SKIN:  No rashes or lesions.      NEUROLOGIC: Cranial nerves II-XII are intact bilaterally.  Moves all     extremities. Ambulates with ease.      PSYCH:  Appropriate mood and affect.      Vtials      Vitals:             Height 5 ft  / 152.4 cm           Weight 172 lbs  / 78.786905 kg           BSA 1.85 m2           BMI 33.6 kg/m2           Temperature 98.3 F / 36.83 C - Oral           Pulse 98           Respirations 14           Blood Pressure 158/76 Sitting, Left Arm           Pulse Oximetry 84%, roomair@rest           Comment: Oxygen improved to 95% with 2 liters oxygen             REVIEW      Results Reviewed      PCCS Results Reviewed?:  Yes Prev Lab Results, Yes Prev Radiology Results, Yes     Previous Mecial Records      Lab Results      Reviewed my last clinic note and Dr. Chaney's note as well as lab tests     performed in early February.            I also reviewed the isolated specimen of mycobacterium avium complex 04/2017.            PLAN      Assessment      Notes      Discontinued Medications      * predniSONE* 20 MG TABLET: 20 MG PO QDAY #10      * predniSONE* (Deltasone*) 10 MG TABLET: 10 MG PO QDAY 10 Days #10       Instructions: Finish the 20mg tablets you already have at home then start 10mg     every other day x 5 days then do 5mg every other day x 5 days then stop      * Neb-NaCl 3% (Sodium Chloride 3% Neb) 4 ML VIAL.NEB: 4 ML INH RTBID 30 Days #60      ASSESSMENT:  The patient is a 89 year old female with a history of COPD,     emphysema, bronchiectasis, chronic hypoxemic respiratory failure on home oxygen     2-3 liters, diastolic congestive heart failure, stage 3 CKD as well as chronic     mycobacterium avium complex who presents for follow up.            1.  MAC.      2.  COPD.      3.  Chronic hypoxemic respiratory failure.      4.  Bronchiectasis.      5.  Neuropathy of the lower extremities.      6. Right ankle swelling.      7.  CKD stage 3.      8.  Gastroesophageal reflux disease.              PLAN:      1.  I offered to get an x-ray of her right ankle and leg, but she states she     will see her PCP next week and would rather follow up with  him regarding this.      Additionally, I reviewed the side effects of her medications and rifampin has     been known in less than 4% of people to cause neuropathy and joint pains.      Rifampin is also known to cause GI upset. ethambutol can cause vision changes     and uveitis.  I have recommended that she have a baseline eye exam which I     still recommend today.        2.  I spent approximately five minutes  speaking with the patient and her sister     about the pro's and con's of continuing with the medication versus stopping it.      She would prefer to ride out these potential side effects and continue with     the medication to treat the organism that was isolated last spring. She was     unable to provide a sputum sample for us at this time. Eventually, we will     either need to recollect sputum to show no further AFB growth or complete a     minimal one years treatment.      3. Continue  omeprazole 40 mg twice a day.  She will continue with this for a     minimum of eight weeks and before we step her down to 40 mg once a day, this     can be done by her PCP.      4.  In regards to bronchiectasis, she is able to clear her sputum. Her     insurance would not pay for nebulized 3% saline.      5.  The patient will continue with Arnuity and Anoro daily.        6.  Samples were provided in our office.        7.  She will follow up in three months.            Patient Education      Education resources provided:  Yes (side effects of antibiotics, neuropathy,     MAC infection )                 Disclaimer: Converted document may not contain table formatting or lab diagrams. Please see [employer] [facility] for the authenticated document.

## 2021-05-28 NOTE — PROGRESS NOTES
Patient: CARRINGTON LEDBETTER     Acct: [id number]     Report: #[id number]  UNIT #: [id number]     : 1925    Encounter Date:2020  PRIMARY CARE: DAYDAY MARY DO  ***Signed***  --------------------------------------------------------------------------------------------------------------------  TELEHEALTH NOTE      History of Present Illness      Chief Complaint: 1 week follow up/COPD            Carrington Ledbetter is presenting for evaluation via Telehealth visit. Verbal     consent obtained before beginning visit.            Provider spent 15 minutes with the patient during telehealth visit.            The following staff were present during the visit: Karlie Teague MA, Jacqueline LITTLE            The patient is a 89 year old female patient of Dr. Benítez's with chronic hypoxic    respiratory failure, chronic mycobacterium avium infection and cannot tolerate     treatment. The patient has a history of bronchiectasis, chronic cough and     difficulty with airway clearance and recurrent bronchitis. The patient presents     for Telehealth visit today. The patient was seen by myself on 2020 due to     increasing shortness of air and coughing. At that time the patient was referred     to the [facility] and was tested for COVID-19 which came back negative. The     patient had a chest x-ray that showed some worsening congestive heart failure     and Bumex was sent to over to the pharmacy for the patient. The patient states     she was seen by her cardiologist Dr. Mcfarlane however she does not want to     continue to follow up with him and is requesting to establish care with Dr. Shultz    instead. The patient was not able to provide a sputum culture. The patient     states she is feeling much better since her last office visit. The patient st    ates her shortness of air and cough has significantly improved and she is no     longer wheezing. The patient states she is taking Anoro everyday as prescribed      and is using her flutter valve twice daily with levalbuterol. The patient denies    any fever or chills, night sweats, hemoptysis,  purulent sputum production,     swollen glands in head and neck, unintentional weight loss, chest pain or chest     tightness, abdominal pain, nausea or vomiting or diarrhea. The patient states     her leg swelling has improved  with diuretics. The patient denies any paroxysmal    nocturnal dyspnea or orthopnea. The patient states she is able to perform her     activities of daily living without difficulty.             I reviewed the Review of Systems, medical, surgical and family history and agree    with those as entered.                            Past Med History      Past Med History: COPD            Former Smoker: Pt started smoking at 19 years old, 1 -1/2 ppd x 50 years, quit     35 years ago            Flu and Pneumonia Vaccines: Current            Covid 19 test: Negative      Overview of Symptoms      Pt complains of: Cough, sputum,wheezing, SOB            Pt denies: fever, Chills, nausea, vomiting            Allergies/Medications      Allergies:        Coded Allergies:             *No Known Allergies (Verified  Allergy, Mild, 10/30/19)      Medications    Last Reconciled on 5/12/20 09:32 by ELINOR VILLARREAL       Potassium Chloride (K-Dur*) 10 Meq Tab.er.prt      10 MEQ PO once daily for 30 Days, #30 TAB 2 Refills         Prov: ELINOR VILLARREAL [facility]         5/12/20       Umeclidinium/Vilanterol 62.5-25 Mcg Inh (Anoro Ellipta 62.5-25 Mcg Inh) 1 Each     Blst.w.dev      1 PUFF INH QDAY, #1 INH 0 Refills         Reported         5/12/20       predniSONE (Deltasone) 5 Mg Tablet      20 MG PO QDAY, TAB         Reported         5/12/20       Bumetanide (BUMETANIDE) 1 Mg Tablet      1 MG PO BID, #14 TAB         Prov: ROSITA GREEN         5/7/20       dilTIAZem LA (Cardizem LA) 240 Mg Tab.er.24h      240 MG PO QDAY for 30 Days, #30 TAB.ER.24H         Reported         4/15/20        Irbesartan (Irbesartan) 75 Mg Tablet      75 MG PO QDAY for 30 Days, #30 TAB         Reported         6/12/19       Insulin Degludec (Tresiba Flextouch U-200) 200 Unit/1 Ml Insuln.pen      8 UNITS SUBQ QDAY for 30 Days, #1 INSULN.PEN         Reported         6/12/19       Furosemide* (Lasix*) 40 Mg Tablet      40 MG PO BID@09,17, #60 TAB 0 Refills         Prov: Sumit Moreno         2/9/19       Insulin Lispro (HumaLOG VIAL) 100 Units/Ml Vial      0 SUBQ QID INSULIN, #1 VIAL 0 Refills         Reported         2/6/19       (Oxygen) (OXYGEN)   No Conflict Check      2 L NS PRN PRN for SHORTNESS OF BREATH         Reported         12/6/18       Allopurinol (Allopurinol) 100 Mg Tablet      100 MG PO BID, #30 TAB 0 Refills         Reported         10/2/18       Aspirin Chew (Aspirin Chew) 81 Mg Tab.chew      81 MG PO QAM, #30 TAB.CHEW 0 Refills         Reported         9/14/17       Montelukast Sodium (Montelukast*) 10 Mg Tablet      10 MG PO HS, TAB         Reported         9/25/13            Plan/Instructions      Ambulatory Assessment/Plan:        CHF (congestive heart failure) - I50.9            Notes      New Medications      * predniSONE (Deltasone) 5 MG TABLET: 20 MG PO QDAY         Replaced predniSONE 20 MG TABLET:         40 MG PO QDAY 7 Days #14      * Umeclidinium/Vilanterol 62.5-25 Mcg Inh (Anoro Ellipta 62.5-25 Mcg Inh) 1 EACH       BLST.W.DEV: 1 PUFF INH QDAY #1      * Potassium Chloride (K-Dur*) 10 MEQ TAB.ER.PRT: 10 MEQ PO once daily 30 Days       #30      Discontinued Medications      * Neb-Levalbuterol (Xopenex) 1.25 MG/3 ML VIAL.NEB: 1.25 MG INH RTQ6H #120         Instructions: Dx: J44.9      * predniSONE 20 MG TABLET: 40 MG PO QDAY 7 Days #14      New Diagnostics      * BMP, Month         Dx: CHF (congestive heart failure) - I50.9      * Magnesium Serum, Routine         Dx: CHF (congestive heart failure) - I50.9      New Referrals      * Cardiology, Routine         Dx: CHF (congestive heart  failure) - I50.9      Plan/Instructions      * Plan Of Care: ()            * Chronic conditions reviewed and taken into consideration for today's treatment       plan.      * Patient instructed to seek medical attention urgently for new or worsening       symptoms.      * Patient was educated/instructed on their diagnosis, treatment and medications       prior to discharge from the clinic today.            ASSESSMENT:      1. Dyspnea improved.       2. Cough improved.       3. Recurrent bronchitis.       4. Mycobacterium avium infection of lung intolerant to triple antibiotics     therapy.       5. Chronic hypoxic respiratory failure on continuous oxygen.       6. Bronchiectasis.       7. Chronic obstructive pulmonary disease without acute exacerbation.       8. Tobacco abuse of cigarettes in remission for 20 years.             PLAN:      1. The is to continue Anoro everyday as prescribed. Continue albuterol inhaler     and nebulizer treatment as prescribed.       2. Continue flutter valve and nebulizer twice daily to assist with airway     clearance.       3. Continue oxygen at current rate.       4. We will recheck a chest x-ray in 6 weeks and we will recheck a BNP and     magnesium level.       5. The patient reports he is on Lasix 40 mg, however is not on potassium     supplementation.  I will prescribe potassium chloride 10 meq once daily and have     patient recheck labs.       6. The patient is advised to call the office, call 911 or go to the ER for any     new or worsening symptoms.       7. The patient reports she is up to date with  flu, Prevnar and Pneumovax.       8. The patient would like to see a different cardiologist and is requesting to     see Dr. Shultz. I will place referral for patient to see Dr. Shultz for congestive     heart failure.       9. Follow up with Dr. Benítez in 6-8 weeks sooner if needed.      Codes:  Phone Eval 11-20 mi [id number]            Electronically signed by ELINOR [facility]ILLARREAL [facility]   05/29/2020 10:38       Disclaimer: Converted document may not contain table formatting or lab diagrams. Please see [employer] for the authenticated document.

## 2021-06-05 NOTE — PROGRESS NOTES
"   Progress Note      Patient Name: Carrington Ledbetter   Patient ID: [id number]   Sex: Female   YOB: 1925    Primary Care Provider: Jeni Pope DO   Referring Provider: Jeni Pope DO    Visit Date: May 17, 2021    Provider: Jeni Pope DO   Location: [facility] [place]   Location Address: [address] [place] [address]   VIDHI Linder  [id number]   Location Phone: [phone]          Chief Complaint     Problems with DM       History Of Present Illness  Carrington Ledbetter is a 89 year old /Black female who presents for evaluation and treatment of:      1 year. She reports random blood glucose as high as 300+ and intermittent low blood glucose (69). She was previously prescribed both a basal and short acting insulin - basal insulin was discontinued secondary to well-controlled DM. She reports recently being placed on a second diuretic per nephrology - but she does not remember the name of that medication. She reports taking a dose of her basal insulin during her most recent episode of hyperglycemia - notes 'it took a while for my blood sugar to come down.' Most recent A1C - 7.1% - 3/2020. Other than recently prescribed additional diuretic - no new medications.\">1.) IDDM : The patient presents with complaints of recent worsening blood glucose, which has been well-controlled for > 1 year. She reports random blood glucose as high as 300+ and intermittent low blood glucose (69). She was previously prescribed both a basal and short acting insulin - basal insulin was discontinued secondary to well-controlled DM. She reports recently being placed on a second diuretic per nephrology - but she does not remember the name of that medication. She reports taking a dose of her basal insulin during her most recent episode of hyperglycemia - notes 'it took a while for my blood sugar to come down.' Most recent A1C - 7.1% - 3/2020. Other than recently prescribed additional diuretic - no new medications. " "      Past Medical History  Disease Name Date Onset Notes   Arthritis --  --    Cataract --  --    CHF (congestive heart failure) --  Followed by [facility].   Chronic kidney disease --  On dialysis Tuesday, Thursdays and Saturdays - followed by Dr. Chaney.   COPD (chronic obstructive pulmonary disease) --  Followed by Dr. Lisa Benítez, DO - oxygen dependent - 2 liters at rest.   Diabetes mellitus, type 2 --  --    GERD (gastroesophageal reflux disease) --  --    Gout --  --    Hypertension, Benign Essential --  --          Past Surgical History  Procedure Name Date Notes   Cholecystectomy --  --    Hysterectomy --  --          Medication List  Name Date Started Instructions   Accu-Chek Fastclix Lancet Drum miscellaneous misc 07/13/2020 use as directed qid   acetaminophen-codeine 300-30 mg oral tablet 08/21/2020 take 1 tablet by oral route 2 times a day as needed   Anoro Ellipta 62.5-25 mcg/actuation inhalation blister with device  inhale 1 puff by inhalation route once daily at the same time each day   Aspir-Low 81 mg oral tablet,delayed release (DR/EC)  take 1 tablet (81 mg) by oral route once daily   BD Ultra-Fine Vonda Pen Needle 32 gauge x 5/32\" miscellaneous needle 07/13/2020 use as directed for 90 days QID   carvedilol 12.5 mg oral tablet 08/21/2020 take 1 tablet (12.5 mg) by oral route 2 times per day with food for 90 days   Cough Drops 2.7 mg mucous membrane lozenge 01/20/2021 dissolve 1-2 lozenges by oral route once a day (in the evening)   Daria-Sequels (iron-vit c) 200 mg (65 mg iron)-25 mg oral tablet extended release 08/21/2020 take 1 tablet by oral route 2 times a day for 90 days   Flonase Allergy Relief 50 mcg/actuation nasal spray,suspension 08/21/2020 inhale 2 sprays (100 mcg) in each nostril by intranasal route once daily   fluconazole 150 mg oral tablet 12/17/2020 take 1 tablet by oral route daily for 14 days   furosemide 40 mg oral tablet 02/08/2021 take 1 tablet by oral route 2 times a " "day for 90 days   Humalog KwikPen Insulin 100 unit/mL subcutaneous insulin pen 07/13/2020 inject by subcutaneous route per prescriber's instructions. Insulin dosing requires individualization. for 30 days   levalbuterol HCl 1.25 mg/3 mL inhalation solution for nebulization 08/19/2019 inhale 3 milliliters (1.25 mg) by nebulization route every 6 hours for 30 days   losartan 25 mg oral tablet 01/20/2021 take 1 tablet (25 mg) by oral route once daily for 90 days   mirtazapine 7.5 mg oral tablet 08/21/2020 Take 1/2 a tablet daily at bedtime.   NovoFine Plus 32 gauge x 1/6\" miscellaneous needle 07/13/2020 use as directed for 30 days   OneTouch Ultra Blue Test Strip miscellaneous strip 08/10/2020 Test blood sugar TID   pantoprazole 40 mg oral tablet,delayed release (DR/EC) 06/19/2020 take 1 tablet (40 mg) by oral route once daily for 90 days   potassium chloride 8 mEq oral tablet extended release 01/08/2021 take 1 tablet (8 meq) by oral route once daily for 90 days   Probiotic 15 billion cell oral capsule 08/21/2020 take 1 capsule by oral route daily for 90 days   Singulair 10 mg oral tablet 01/20/2021 take 1 tablet (10 mg) by oral route once daily in the evening for 90 days   TRANSPORT WHEELCHAIR N/A 07/13/2020 PLEASE DISPENSE 1 UNIT   Zaditor 0.025 % (0.035 %) ophthalmic (eye) drops 01/20/2021 instill 1 drop in affected eye 2 times a day as needed         Allergy List  Allergen Name Date Reaction Notes   NO KNOWN DRUG ALLERGIES --  --  --          Family Medical History  Disease Name Relative/Age Notes   DM Type I Brother/  Mother/   Mother; Brother   Hypertension Brother/  Mother/   Mother; Brother   Cerebrovascular Accident (CVA) Mother/   Mother         Social History  Finding Status Start/Stop Quantity Notes   Alcohol Former --/-- --  drank alcohol in the past   Has had a blood transfusion --  --/-- --  yes   Recreational Drug Use Never --/-- --  never used   Second hand smoke exposure Unknown --/-- --  no   Tobacco " "Former --/-- 2.5 PPD former smoker, smokes more than 2 packs per day, for 40 or more years, in the past used other tobacco products   Uses seatbelts --  --/-- --  yes         Immunizations  NameDate Admin Mfg Trade Name Lot Number Route Inj VIS Given VIS Publication   [id number]/26/2019 [facility] FLUZONE-HIGH DOSE [id number] IM LD 09/26/2019    Comments: NDC 36434-181-66   [id number]/30/2019 [facility] PNEUMOVAX 23 [id number] IM LA 10/30/2019    Comments: NDC: 8918-7492-49         Review of Systems  · Constitutional  o Denies  o : fatigue, night sweats  · Eyes  o Denies  o : double vision, blurred vision  · HENT  o Denies  o : vertigo, recent head injury  · Cardiovascular  o Denies  o : chest pain, irregular heart beats  · Respiratory  o Denies  o : shortness of breath, productive cough  · Gastrointestinal  o Denies  o : nausea, vomiting  · Genitourinary  o Denies  o : dysuria, urinary retention  · Integument  o Denies  o : hair growth change, new skin lesions  · Neurologic  o Denies  o : altered mental status, seizures  · Musculoskeletal  o Denies  o : joint swelling, limitation of motion  · Endocrine  o Denies  o : cold intolerance, heat intolerance  · Psychiatric  o Denies  o : hallucinations, delusions  · Heme-Lymph  o Denies  o : petechiae, lymph node enlargement or tenderness  · Allergic-Immunologic  o Denies  o : frequent illnesses      Vitals  Date Time BP Position Site L\R Cuff Size HR RR TEMP (F) WT  HT  BMI kg/m2 BSA m2 O2 Sat FR L/min FiO2        05/17/2021 02:18 /62 Sitting    86 - R  97.5 134lbs 4oz 4'  10\" 28.06 1.58 94 %            Physical Examination  · Constitutional  o Appearance  o : alert, in no acute distress  · Head and Face  o HEENT  o : nasal cannula in place   · Eyes  o Conjunctivae  o : conjunctivae normal  o Pupils and Irises  o : pupils equal and round  · Neck  o Inspection/Palpation  o : supple  o Thyroid  o : no thyromegaly  · Respiratory  o Respiratory Effort  o : breathing " unlabored  · Cardiovascular  o Heart  o :   § Auscultation of Heart  § : regular rate and rhythm  o Peripheral Vascular System  o :   § Extremities  § : mild lower extremity edema present   · Gastrointestinal  o Abdominal Examination  o :   § Abdomen  § : non-distended   · Lymphatic  o Neck  o : supple   · Musculoskeletal  o General  o :   § General Musculoskeletal  § : bilateral ankle edema   · Skin and Subcutaneous Tissue  o General Inspection  o : no lesions present, no areas of discoloration, skin turgor normal, texture normal  · Neurologic  o Gait and Station  o :   § Gait Screening  § : walker dependent   · Psychiatric  o Mood and Affect  o : mood normal, affect appropriate  · Left DM Foot Exam  o Sensation  o : normal sensory exam perceptible to 10-gram nylon monofilament exam (5/5) and light touch   o Visual Inspection  o : visual inspection reveals toe deformities and corns   o Vascular  o : palpable dorsalis pedis, normal capillary refill  · Right DM Foot Exam  o Sensation  o : normal sensory exam perceptible to 10-gram nylon monofilament exam (5/5) and light touch  o Visual Inspection  o : visual inspection reveals toe deformities and corns   o Vascular  o : palpable dorsalis pedis, normal capillary refill          Assessment  · Insulin dependent type 2 diabetes mellitus       Type 2 diabetes mellitus without complications     250.00/E11.9  Long term (current) use of insulin     250.00/Z79.4    A.) RBG here in office - 96, but this could be secondary to basal insulin. Will start high dose sliding scale - discussed with patient in room. Advised against 1 big meal per day as noted by george in room, recommend smaller meals instead. Hold Tresiba for now, until labs are resulted. Provided with handouts regarding healthy foods choices for diabetics. diabetic foot exam completed. Referred for dilated retinal exam. Abdominal US to r/o new pancreatic pathology. Follow up in 1 week.         Plan  · Orders  o CBC  with Auto Diff [facility] (07639) - 250.00/E11.9, 250.00/Z79.4 - 05/17/2021  o IOP - Glucose, Fingerstick (73944) - 250.00/E11.9, 250.00/Z79.4 - 05/17/2021   96  o ACO-39: Current medications updated and reviewed (1159F, ) - - 05/17/2021  o Diabetes 2 Panel (CMP, Lipid, A1c, Urine Microalbumin) [facility] (48686, 54028, 61986, 62525) - 250.00/E11.9, 250.00/Z79.4 - 05/17/2021  o Abdomen ultrasound (17199, 88936) - 250.00/E11.9, 250.00/Z79.4 - 05/17/2021   Recent sudden worsening of blood glucose - as high as 300+ - please assess pancreas.   Please schedule for ASAP.  o Diabetic Foot (Motor and Sensory) Exam Completed [facility] (, , 2028F) - 250.00/E11.9, 250.00/Z79.4 - 05/17/2021  o Diabetic Dilated Eye Exam Consult with Ophthalmology/Optometry (DMEYE) - 250.00/Z79.4, 250.00/E11.9 - 05/17/2021  · Medications  o Medications have been Reconciled  o Transition of Care or Provider Policy  · Instructions  o Patient was educated/instructed on their diagnosis, treatment and medications prior to discharge from the clinic today.            Electronically Signed by: Jeni Pope DO - on May 17, 2021 03:04:20 PM

## 2021-06-10 NOTE — TELEPHONE ENCOUNTER
Caller: GIOVANI/NIECE    Relationship: Other Relative    Best call back number: [phone]  [phone]    What was the call regarding:PATIENTS NIECE CALLED STATING THE PATIENT WOKE UP WITH A BLACK LEFT EYE AND THEY DONT KNOW WHAT COULD HAVE CAUSED IT AS SHE DID NOT FALL OR HIT HER HEAD.    Do you require a callback: YES

## 2021-06-11 NOTE — TELEPHONE ENCOUNTER
Caller: Carrington Ledbetter    Relationship: Self    Best call back number: [phone]    Medication needed:   KLOR-CON  HUMALOG    When do you need the refill by: 6/11/21    Does the patient have less than a 3 day supply:  [x] Yes  [] No    What is the patient's preferred pharmacy: [facility] [place] [place] - [address] AT [address] TO [facility] [phone] [facility] [phone] FX

## 2021-06-15 NOTE — TELEPHONE ENCOUNTER
I would ice it. BID 20 mins at a time. If she starts to experience any concerning sxs like vision changes, eye pain, headaches, nausea, change in mental status, etc - contact us. Thanks!

## 2021-06-16 NOTE — TELEPHONE ENCOUNTER
I had left Ms. Ledbetter a message a couple of days ago regarding facial bruising with unknown injuries. She never returned call. Called home number again today and spoke to patient. She said she figured out that it was a hugo from her oxygen tubing where she slept on it the night prior and that nothing is wrong at this time and the marking has completely gone away. She then asked about a prior authorization about insulin and I transferred call to . --PARIS

## 2021-06-17 NOTE — TELEPHONE ENCOUNTER
Provider: DAYDAY MARY  Caller: ANTONY MARTINEZ  Relationship to Patient: SELF    Pharmacy: [facility] - [place], [place] - [address] AT [address] to [facility] - [phone] [facility] [phone] [phone]    Phone Number: [phone]    Reason for Call: PATIENT RECEIVES A CALL FROM [facility] DAILY ASKING FOR DOCTOR MARY TO CALL THEM TO AUTHORIZE HUMALOG INSULIN PRESCRIPTION BUT THEY HAVE NOT RECEIVED THE CALL. CAN DOCTOR TYRA PLEASE AUTHORIZE THIS MEDICATION OR OFFER A SUBSTITUTE THAT WILL BE COVERED BY PATIENT'S INSURANCE. PATIENT WOULD PREFER A PREFILLED PEN TYPE INSULIN IF A SUBSTITUTE IS OFFERED.     [facility] - [place], [place] - [address] AT [address] to [facility] - [phone] [facility] [phone] [phone]

## 2021-07-12 NOTE — PATIENT INSTRUCTIONS
Diabetic foot exam performed and documented this date, compliant with CQM required standards. Detail of findings as noted in physical exam.  Lower extremity Neurologic exam for diabetic patient performed and documented this date, compliant with PQRS required standards. Detail of findings as noted in physical exam.  Advised patient importance of good routine lower extremity hygiene. Advised patient importance of evaluating for intact skin and pain free nail borders.  Advised patient to use mirror to evaluate plantar/ soles of feet for better visualization. Advised patient monitor and phone office to be seen if any cracking to skin, open lesions, painful nail borders or if nails become elongated prior to next visit. Advised patient importance of daily cleansing of lower extremities, followed by good skin cream to maintain normal hydration of skin. Also advised patient importance of close daily monitoring of blood sugar. Advised to regulate diet and medications to maintain control of blood sugar in optimal range. Contact primary care provider if difficulties maintaining blood sugar levels.  Advised Patient of presence of Diabetes Mellitus condition.  Advised Patient risk of progression and worsening or improvement, then return of condition.  Will monitor condition for any change in future. Treat with most appropriate treatment pending status of condition.  Counseled and advised patient extensively on nature and ramifications of diabetes. Standard instructions given to patient for good diabetic foot care and maintenance. Advised importance of careful monitoring to avoid break down and complications secondary to diabetes. Advised patient importance of strict maintenance of blood sugar control. Advised patient of possible ominous results from neglect of condition, i.e.: amputation/ loss of digits, feet and legs, or even death.  Patient states understands counseling, will monitor closely, continue good hygiene and  routine diabetic foot care. Patient will contact office is questions or problems.      Patient is to monitor for recurrence and any new symptoms and to contact Dr. Garcia's office for a follow-up appointment.      The patient states understanding and agreement with this plan.

## 2021-07-12 NOTE — PROGRESS NOTES
[facility] - PODIATRY    Today's Date: 07/12/21    Patient Name: Carrington Ledbetter  MRN: [id number]  CSN: [id number]  PCP: Jeni Pope DO  Referring Provider: No ref. provider found    SUBJECTIVE     Chief Complaint   Patient presents with   • Left Foot - Nail Problem   • Right Foot - Nail Problem     HPI: Carrington Ledbetter, a 89 y.o.female, comes to clinic.    New, Established, New Problem:  established     Location:  Toenails    Duration:   Greater than five years    Onset:  Gradual    Nature:  sore with palpation.    Stable, worsening, improving:   Stable    Aggravating factors:  Pain with shoe gear and ambulation.    Previous Treatment:  debridement  __________________________________    New, Established, New Problem:  Established    Location:  bilateral feet    Duration:    Onset:  gradual    Nature:   NIDDM     Stable, worsening, improving:  stable    Patient reported last blood glucose: Patient states they are unsure of the most recent blood glucose reading.  __________________________________    Patient reports the following medical changes since their last visit: None.    No other pedal complaints at this time.    Patient denies any fevers, chills, nausea, vomiting, shortness of breathe, nor any other constitutional signs nor symptoms.       Past Medical History:   Diagnosis Date   • Ankle pain    • Arthritis    • Dialysis patient (CMS/[facility])    • Difficulty walking 01/17/2019   • DM type 2 (diabetes mellitus, type 2) (CMS/[facility])    • Foot pain, bilateral 03/06/2018   • Hammer toe    • HBP (high blood pressure)    • Ingrowing nail 03/06/2018   • Kidney failure    • Oxygen dependent    • Oxygen desaturation during sleep    • Tinea unguium 03/06/2018     Past Surgical History:   Procedure Laterality Date   • APPENDECTOMY     • CHOLECYSTECTOMY     • HYSTERECTOMY     • PACEMAKER IMPLANTATION       Family History   Problem Relation Age of Onset   • Stroke Mother    • Heart disease Mother    • Diabetes  type II Mother    • Heart disease Brother    • Diabetes type II Brother      Social History     Socioeconomic History   • Marital status:      Spouse name: Not on file   • Number of children: Not on file   • Years of education: Not on file   • Highest education level: Not on file   Tobacco Use   • Smoking status: Former Smoker     Packs/day: 2.00     Types: Cigarettes   • Smokeless tobacco: Never Used   • Tobacco comment: quit at age 60   Vaping Use   • Vaping Use: Never used   Substance and Sexual Activity   • Alcohol use: Yes     Comment: light   • Drug use: Never   • Sexual activity: Defer     No Known Allergies  Current Outpatient Medications   Medication Sig Dispense Refill   • albuterol sulfate  (90 Base) MCG/ACT inhaler Inhale 2 puffs Every 6 (Six) Hours As Needed.     • allopurinol (ZYLOPRIM) 300 MG tablet allopurinol 300 mg oral tablet take 1 tablet (300 mg) by oral route once daily   Active     • amLODIPine (NORVASC) 5 MG tablet amlodipine 5 mg oral tablet take 1 tablet (5 mg) by oral route once daily   Suspended     • aspirin (aspirin) 81 MG EC tablet aspirin 81 mg oral tablet,delayed release (DR/EC) take 1 tablet (81 mg) by oral route once daily   Active     • carvedilol (COREG) 6.25 MG tablet Take 6.25 mg by mouth.     • docusate sodium 100 MG capsule docusate sodium 100 mg oral capsule take 1 capsule (100 mg) by oral route 2 times per day   Active     • Fluticasone Furoate (Arnuity Ellipta) 100 MCG/ACT aerosol powder  Arnuity Ellipta 100 mcg/actuation inhalation blister with device inhale 1 puff (100 mcg) by inhalation route once daily at the same time each day   Active     • Fluticasone-Umeclidin-Vilant (Trelegy Ellipta) 200-62.5-25 MCG/INH inhaler Inhale.     • furosemide (LASIX) 40 MG tablet furosemide 40 mg oral tablet take 1 tablet by oral route 2 times a day for 90 days 2/8/2021  Active     • gabapentin (NEURONTIN) 100 MG capsule      • insulin aspart (NovoLOG) 100 UNIT/ML injection  Inject per high dose sliding scale - max dose of 36 units per day. 10 mL 3   • Klor-Con 8 MEQ CR tablet Take 1 tablet by mouth Daily. 30 tablet 0   • levalbuterol (XOPENEX) 1.25 MG/3ML nebulizer solution 3 mL.     • losartan (COZAAR) 25 MG tablet losartan 25 mg oral tablet take 1 tablet (25 mg) by oral route once daily 2/11/2021  Active     • lovastatin (MEVACOR) 40 MG tablet lovastatin 40 mg oral tablet take 1 tablet (40 mg) by oral route once daily with the evening meal   Active     • metOLazone (ZAROXOLYN) 5 MG tablet Take 5 mg by mouth.     • montelukast (SINGULAIR) 10 MG tablet montelukast 10 mg oral tablet take 1 tablet (10 mg) by oral route once daily in the evening   Active     • predniSONE (DELTASONE) 10 MG tablet prednisone 10 mg oral tablet take 1 tablet (10 mg) by oral route 3 times per day   Active     • Simethicone (Gas-X Extra Strength) 62.5 MG strip Gas-X 62.5 mg oral strip take 1 strip by oral route daily   Active       No current facility-administered medications for this visit.     Review of Systems   Constitutional: Negative.    Skin:        Painful toenails bilaterally   All other systems reviewed and are negative.      OBJECTIVE     Vitals:    07/12/21 1116   BP: 125/90   Pulse: 82   Temp: 96.9 °F (36.1 °C)   SpO2: 98%       Lab Results   Component Value Date    HGBA1C 6.1 (H) 05/17/2021       Lab Results   Component Value Date    CALCIUM 9.0 05/26/2021     05/26/2021    K 4.8 05/26/2021    CO2 31 05/26/2021     05/26/2021    BUN 87 (H) 05/26/2021    CREATININE 1.91 (H) 05/26/2021    BCR 46 (H) 05/26/2021    ANIONGAP 17 05/26/2021       Patient seen in no apparent distress.      PHYSICAL EXAM:     Foot/Ankle Exam:       General:   Diabetic Foot Exam Performed    Appearance: elderly    Appearance comment:  Utilizing nasal oxygen  Orientation: AAOx3    Affect: appropriate    Assistance: cane    Shoe Gear:  Casual shoes    VASCULAR      Right Foot Vascularity   Normal vascular exam     Dorsalis pedis:  2+  Posterior tibial:  2+  Skin Temperature: warm    Edema Gradin+  CFT:  < 3 seconds  Pedal Hair Growth:  Present  Varicosities: none       Left Foot Vascularity   Normal vascular exam    Dorsalis pedis:  2+  Posterior tibial:  2+  Skin Temperature: warm    Edema Gradin+  CFT:  < 3 seconds  Pedal Hair Growth:  Present  Varicosities: none        NEUROLOGIC     Right Foot Neurologic   Normal sensation    Light touch sensation:  Normal  Vibratory sensation:  Normal  Hot/Cold sensation: normal       Left Foot Neurologic   Normal sensation    Light touch sensation:  Normal  Vibratory sensation:  Normal  Hot/cold sensation: normal       MUSCLE STRENGTH     Right Foot Muscle Strength   Foot dorsiflexion:  4+  Foot plantar flexion:  4+  Foot inversion:  4+  Foot eversion:  4+     Left Foot Muscle Strength   Foot dorsiflexion:  4+  Foot plantar flexion:  4+  Foot inversion:  4+  Foot eversion:  4+     DERMATOLOGIC     Right Foot Dermatologic   Skin: skin intact    Nails: onychomycosis, abnormally thick, subungual debris, dystrophic nails and ingrown toenail    Nails comment:  Toenails 1, 2, 3, and 5     Left Foot Dermatologic   Skin: skin intact    Nails: onychomycosis, abnormally thick, subungual debris, dystrophic nails and ingrown toenail    Nails comment:  Toenails 1, 2, 3, and 5      ASSESSMENT/PLAN     Diagnoses and all orders for this visit:    1. Foot pain, bilateral (Primary)    2. Diabetic foot (CMS/HCC)    3. Onychocryptosis    4. Onychomycosis    5. Difficulty walking        Comprehensive lower extremity examination and evaluation was performed.    Discussed findings and treatment plan including risks, benefits, and treatment options with patient in detail. Patient agreed with treatment plan.    Toenails 1, 2, 3, 5 bilaterally were debrided in thickness and length and then smoothed with a Dremel Tool.  Tolerated the procedure well without complications.    Diabetic foot exam  performed and documented this date, compliant with CQM required standards. Detail of findings as noted in physical exam.  Lower extremity Neurologic exam for diabetic patient performed and documented this date, compliant with PQRS required standards. Detail of findings as noted in physical exam.  Advised patient importance of good routine lower extremity hygiene. Advised patient importance of evaluating for intact skin and pain free nail borders.  Advised patient to use mirror to evaluate plantar/ soles of feet for better visualization. Advised patient monitor and phone office to be seen if any cracking to skin, open lesions, painful nail borders or if nails become elongated prior to next visit. Advised patient importance of daily cleansing of lower extremities, followed by good skin cream to maintain normal hydration of skin. Also advised patient importance of close daily monitoring of blood sugar. Advised to regulate diet and medications to maintain control of blood sugar in optimal range. Contact primary care provider if difficulties maintaining blood sugar levels.  Advised Patient of presence of Diabetes Mellitus condition.  Advised Patient risk of progression and worsening or improvement, then return of condition.  Will monitor condition for any change in future. Treat with most appropriate treatment pending status of condition.  Counseled and advised patient extensively on nature and ramifications of diabetes. Standard instructions given to patient for good diabetic foot care and maintenance. Advised importance of careful monitoring to avoid break down and complications secondary to diabetes. Advised patient importance of strict maintenance of blood sugar control. Advised patient of possible ominous results from neglect of condition, i.e.: amputation/ loss of digits, feet and legs, or even death.  Patient states understands counseling, will monitor closely, continue good hygiene and routine diabetic foot care.  Patient will contact office is questions or problems.      An After Visit Summary was printed and given to the patient at discharge, including (if requested) any available informative/educational handouts regarding diagnosis, treatment, or medications. All questions were answered to patient/family satisfaction. Should symptoms fail to improve or worsen they agree to call or return to clinic or to go to the Emergency Department. Discussed the importance of following up with any needed screening tests/labs/specialist appointments and any requested follow-up recommended by me today. Importance of maintaining follow-up discussed and patient accepts that missed appointments can delay diagnosis and potentially lead to worsening of conditions.    Return in about 9 weeks (around 9/13/2021) for Toenail Care., or sooner if acute issues arise.    This document has been electronically signed by Cosmo Garcia DPM on July 12, 2021 12:37 EDT

## 2021-08-17 NOTE — PROGRESS NOTES
[facility] - PODIATRY    Today's Date: 08/17/21    Patient Name: Carrington Ledbetter  MRN: [id number]  CSN: [id number]  PCP: Jeni Pope DO  Referring Provider: No ref. provider found    SUBJECTIVE     Chief Complaint   Patient presents with   • Left Foot - Nail Problem   • Right Foot - Nail Problem     HPI: Carrington Ledbetter, a 89 y.o.female, comes to clinic.    New, Established, New Problem:  established     Location:  Toenails    Duration:   Greater than five years    Onset:  Gradual    Nature:  sore with palpation.    Stable, worsening, improving:   Stable    Aggravating factors:  Pain with shoe gear and ambulation.    Previous Treatment:  debridement  __________________________________    New, Established, New Problem:  Established    Location:  bilateral feet    Duration:    Onset:  gradual    Nature:   NIDDM     Stable, worsening, improving:  stable    Patient reported last blood glucose: Patient states they are unsure of the most recent blood glucose reading.  __________________________________    Patient reports the following medical changes since their last visit: Patient states she had a follow-up appointment with the pulmonary clinic since her last visit.    No other pedal complaints at this time.    Patient denies any fevers, chills, nausea, vomiting, shortness of breathe, nor any other constitutional signs nor symptoms.       Past Medical History:   Diagnosis Date   • Ankle pain    • Arthritis    • Dialysis patient (CMS/[facility])    • Difficulty walking 01/17/2019   • DM type 2 (diabetes mellitus, type 2) (CMS/[facility])    • Foot pain, bilateral 03/06/2018   • Hammer toe    • HBP (high blood pressure)    • Ingrowing nail 03/06/2018   • Kidney failure    • Oxygen dependent    • Oxygen desaturation during sleep    • Tinea unguium 03/06/2018     Past Surgical History:   Procedure Laterality Date   • APPENDECTOMY     • CHOLECYSTECTOMY     • HYSTERECTOMY     • PACEMAKER IMPLANTATION       Family History    Problem Relation Age of Onset   • Stroke Mother    • Heart disease Mother    • Diabetes type II Mother    • Heart disease Brother    • Diabetes type II Brother      Social History     Socioeconomic History   • Marital status:      Spouse name: Not on file   • Number of children: Not on file   • Years of education: Not on file   • Highest education level: Not on file   Tobacco Use   • Smoking status: Former Smoker     Packs/day: 2.00     Types: Cigarettes   • Smokeless tobacco: Never Used   • Tobacco comment: quit at age 60   Vaping Use   • Vaping Use: Never used   Substance and Sexual Activity   • Alcohol use: Yes     Comment: light   • Drug use: Never   • Sexual activity: Defer     No Known Allergies  Current Outpatient Medications   Medication Sig Dispense Refill   • albuterol sulfate  (90 Base) MCG/ACT inhaler Inhale 2 puffs Every 6 (Six) Hours As Needed.     • allopurinol (ZYLOPRIM) 300 MG tablet allopurinol 300 mg oral tablet take 1 tablet (300 mg) by oral route once daily   Active     • amLODIPine (NORVASC) 5 MG tablet amlodipine 5 mg oral tablet take 1 tablet (5 mg) by oral route once daily   Suspended     • aspirin (aspirin) 81 MG EC tablet aspirin 81 mg oral tablet,delayed release (DR/EC) take 1 tablet (81 mg) by oral route once daily   Active     • carvedilol (COREG) 6.25 MG tablet Take 6.25 mg by mouth.     • docusate sodium 100 MG capsule docusate sodium 100 mg oral capsule take 1 capsule (100 mg) by oral route 2 times per day   Active     • Fluticasone Furoate (Arnuity Ellipta) 100 MCG/ACT aerosol powder  Arnuity Ellipta 100 mcg/actuation inhalation blister with device inhale 1 puff (100 mcg) by inhalation route once daily at the same time each day   Active     • Fluticasone-Umeclidin-Vilant (Trelegy Ellipta) 200-62.5-25 MCG/INH inhaler Inhale.     • furosemide (LASIX) 40 MG tablet furosemide 40 mg oral tablet take 1 tablet by oral route 2 times a day for 90 days 2/8/2021  Active     •  gabapentin (NEURONTIN) 100 MG capsule      • insulin aspart (NovoLOG) 100 UNIT/ML injection Inject per high dose sliding scale - max dose of 36 units per day. 10 mL 3   • Klor-Con 8 MEQ CR tablet Take 1 tablet by mouth Daily. 30 tablet 0   • levalbuterol (XOPENEX) 1.25 MG/3ML nebulizer solution 3 mL.     • losartan (COZAAR) 25 MG tablet losartan 25 mg oral tablet take 1 tablet (25 mg) by oral route once daily 2/11/2021  Active     • lovastatin (MEVACOR) 40 MG tablet lovastatin 40 mg oral tablet take 1 tablet (40 mg) by oral route once daily with the evening meal   Active     • metOLazone (ZAROXOLYN) 5 MG tablet Take 5 mg by mouth.     • montelukast (SINGULAIR) 10 MG tablet montelukast 10 mg oral tablet take 1 tablet (10 mg) by oral route once daily in the evening   Active     • predniSONE (DELTASONE) 10 MG tablet prednisone 10 mg oral tablet take 1 tablet (10 mg) by oral route 3 times per day   Active     • Simethicone (Gas-X Extra Strength) 62.5 MG strip Gas-X 62.5 mg oral strip take 1 strip by oral route daily   Active       No current facility-administered medications for this visit.     Review of Systems   Constitutional: Negative.    Skin:        Painful toenails bilaterally   All other systems reviewed and are negative.      OBJECTIVE     Vitals:    08/17/21 1034   BP: 142/46   Pulse: 95   Temp: 97.1 °F (36.2 °C)   SpO2: 94%       Lab Results   Component Value Date    HGBA1C 6.1 (H) 05/17/2021       Lab Results   Component Value Date    GLUCOSE 115 (H) 07/28/2021    CALCIUM 9.2 07/28/2021     07/28/2021    K 4.7 07/28/2021    CO2 23.6 07/28/2021     07/28/2021    BUN 77 (H) 07/28/2021    CREATININE 2.17 (H) 07/28/2021    EGFRIFAFRI 25 (L) 07/28/2021    BCR 35.5 (H) 07/28/2021    ANIONGAP 14.4 07/28/2021       Patient seen in no apparent distress.      PHYSICAL EXAM:     Foot/Ankle Exam:       General:   Diabetic Foot Exam Performed    Appearance: elderly    Appearance comment:  Utilizing nasal  oxygen  Orientation: AAOx3    Affect: appropriate    Assistance: cane    Shoe Gear:  Casual shoes    VASCULAR      Right Foot Vascularity   Normal vascular exam    Dorsalis pedis:  2+  Posterior tibial:  2+  Skin Temperature: warm    Edema Gradin+  CFT:  < 3 seconds  Pedal Hair Growth:  Present  Varicosities: none       Left Foot Vascularity   Normal vascular exam    Dorsalis pedis:  2+  Posterior tibial:  2+  Skin Temperature: warm    Edema Gradin+  CFT:  < 3 seconds  Pedal Hair Growth:  Present  Varicosities: none        NEUROLOGIC     Right Foot Neurologic   Normal sensation    Light touch sensation:  Normal  Vibratory sensation:  Normal  Hot/Cold sensation: normal       Left Foot Neurologic   Normal sensation    Light touch sensation:  Normal  Vibratory sensation:  Normal  Hot/cold sensation: normal       MUSCLE STRENGTH     Right Foot Muscle Strength   Foot dorsiflexion:  4+  Foot plantar flexion:  4+  Foot inversion:  4+  Foot eversion:  4+     Left Foot Muscle Strength   Foot dorsiflexion:  4+  Foot plantar flexion:  4+  Foot inversion:  4+  Foot eversion:  4+     DERMATOLOGIC     Right Foot Dermatologic   Skin: skin intact    Nails: onychomycosis, abnormally thick, subungual debris, dystrophic nails and ingrown toenail    Nails comment:  Toenails 1, 2, 3, and 5     Left Foot Dermatologic   Skin: skin intact    Nails: onychomycosis, abnormally thick, subungual debris, dystrophic nails and ingrown toenail    Nails comment:  Toenails 1, 2, 3, and 5      ASSESSMENT/PLAN     Diagnoses and all orders for this visit:    1. Foot pain, bilateral (Primary)    2. Diabetic foot (CMS/[facility])    3. Onychocryptosis    4. Onychomycosis    5. Non-insulin dependent type 2 diabetes mellitus (CMS/HCC)    6. Difficulty walking        Comprehensive lower extremity examination and evaluation was performed.    Discussed findings and treatment plan including risks, benefits, and treatment options with patient in detail.  Patient agreed with treatment plan.    Toenails 1, 2, 3, 5 bilaterally were debrided in thickness and length and then smoothed with a Dremel Tool.  Tolerated the procedure well without complications.    Diabetic foot exam performed and documented this date, compliant with CQM required standards. Detail of findings as noted in physical exam.  Lower extremity Neurologic exam for diabetic patient performed and documented this date, compliant with PQRS required standards. Detail of findings as noted in physical exam.  Advised patient importance of good routine lower extremity hygiene. Advised patient importance of evaluating for intact skin and pain free nail borders.  Advised patient to use mirror to evaluate plantar/ soles of feet for better visualization. Advised patient monitor and phone office to be seen if any cracking to skin, open lesions, painful nail borders or if nails become elongated prior to next visit. Advised patient importance of daily cleansing of lower extremities, followed by good skin cream to maintain normal hydration of skin. Also advised patient importance of close daily monitoring of blood sugar. Advised to regulate diet and medications to maintain control of blood sugar in optimal range. Contact primary care provider if difficulties maintaining blood sugar levels.  Advised Patient of presence of Diabetes Mellitus condition.  Advised Patient risk of progression and worsening or improvement, then return of condition.  Will monitor condition for any change in future. Treat with most appropriate treatment pending status of condition.  Counseled and advised patient extensively on nature and ramifications of diabetes. Standard instructions given to patient for good diabetic foot care and maintenance. Advised importance of careful monitoring to avoid break down and complications secondary to diabetes. Advised patient importance of strict maintenance of blood sugar control. Advised patient of possible  ominous results from neglect of condition, i.e.: amputation/ loss of digits, feet and legs, or even death.  Patient states understands counseling, will monitor closely, continue good hygiene and routine diabetic foot care. Patient will contact office is questions or problems.      An After Visit Summary was printed and given to the patient at discharge, including (if requested) any available informative/educational handouts regarding diagnosis, treatment, or medications. All questions were answered to patient/family satisfaction. Should symptoms fail to improve or worsen they agree to call or return to clinic or to go to the Emergency Department. Discussed the importance of following up with any needed screening tests/labs/specialist appointments and any requested follow-up recommended by me today. Importance of maintaining follow-up discussed and patient accepts that missed appointments can delay diagnosis and potentially lead to worsening of conditions.    Return in about 5 weeks (around 9/21/2021) for Toenail Care., or sooner if acute issues arise.    This document has been electronically signed by Cosmo Gracia DPM on August 17, 2021 10:57 EDT

## 2021-09-13 NOTE — PROGRESS NOTES
Primary Care Provider  Jeni Pope DO     Referring Provider  No ref. provider found     Chief Complaint  Follow-up (6 month ), Cough, Shortness of Breath, and Wheezing    Subjective          History of Presenting Illness  Patient is a 89-year-old -American female, patient of Dr. Marcum's with a history of chronic Mycobacterium avium disease unable to tolerate triple antibiotic therapy and chronic hypoxic respiratory failure on 2 L of oxygen per minute via nasal cannula continuously.  Patient also has a history of bronchiectasis.  Patient's niece is present with patient in the office today.  Patient states that time she still has difficulty coughing up secretions.  Patient states that she has a productive cough with yellow sputum.  Patient states that she had tried chest vest in the past however due to her pacemaker she is not able to use a chest vest.  Patient states that she does have a flutter valve she uses twice a day however is currently not using any saline nebulizers with the flutter valve.  Patient states that she does get short of breath that is worse with exertion, moderate severity, and improved with rest.  Patient also reports a rash on her nose.  Patient states at times inside of her nose gets dry when she wears her oxygen.  Patient does not have a humidifier bottle with her oxygen at home.  Patient states that she also has intermittent wheezing.  Patient states that she is taking Anoro and Trelegy Ellipta inhaler every day and uses Xopenex nebulizer as needed.  Patient denies fever, chills, night sweats, swollen glands in the head and neck, unintentional weight loss, hemoptysis,  dysphagia, chest pain, palpitations, chest tightness, abdominal pain, nausea, vomiting, and diarrhea.  Patient also denies any myalgias, changes in sense of taste and/or smell, sore throat, any other coronavirus or flu-like symptoms.  Patient denies any leg swelling, orthopnea, paroxysmal nocturnal dyspnea.  Patient  is under the care of Dr. Shultz for congestive heart failure.  Patient is also under the care of Dr. Chaney for end-stage renal disease.  Patient is able to perform her activities of daily living with oxygen in place.    Review of Systems   Constitutional: Negative for activity change, appetite change, chills, diaphoresis, fatigue, fever, unexpected weight gain and unexpected weight loss.        Negative for Insomnia   HENT: Negative for congestion (Nasal), mouth sores, nosebleeds, postnasal drip, sore throat, swollen glands and trouble swallowing.         Negative for Thrush  Negative for Hoarseness  Negative for Allergies/Hay Fever  Negative for Recent Head injury  Negative for Ear Fullness  Negative for Nasal or Sinus pain  Negative for Dry lips  Negative for Nasal discharge   Respiratory: Positive for cough, shortness of breath and wheezing. Negative for apnea and chest tightness.         Negative for Hemoptysis  Negative for Pleuritic pain   Cardiovascular: Negative for chest pain, palpitations and leg swelling.        Negative for Claudication  Negative for Cyanosis  Negative for Dyspnea on exertion   Gastrointestinal: Negative for abdominal pain, diarrhea, nausea, vomiting and GERD.   Musculoskeletal: Negative for joint swelling and myalgias.        Negative for Joint pain  Negative for Joint stiffness   Skin: Positive for rash (on nose). Negative for color change, dry skin and pallor.   Neurological: Negative for syncope, weakness and headache.   Hematological: Negative for adenopathy. Does not bruise/bleed easily.        Family History   Problem Relation Age of Onset   • Stroke Mother    • Heart disease Mother    • Diabetes type II Mother    • Heart disease Brother    • Diabetes type II Brother         Social History     Socioeconomic History   • Marital status:      Spouse name: Not on file   • Number of children: Not on file   • Years of education: Not on file   • Highest education level: Not on file    Tobacco Use   • Smoking status: Former Smoker     Packs/day: 2.00     Types: Cigarettes   • Smokeless tobacco: Never Used   • Tobacco comment: quit at age 60   Vaping Use   • Vaping Use: Never used   Substance and Sexual Activity   • Alcohol use: Yes     Comment: light   • Drug use: Never   • Sexual activity: Defer        Past Medical History:   Diagnosis Date   • Ankle pain    • Arthritis    • Dialysis patient (CMS/[facility])    • Difficulty walking 01/17/2019   • DM type 2 (diabetes mellitus, type 2) (CMS/[facility])    • Foot pain, bilateral 03/06/2018   • Hammer toe    • HBP (high blood pressure)    • Ingrowing nail 03/06/2018   • Kidney failure    • Oxygen dependent    • Oxygen desaturation during sleep    • Tinea unguium 03/06/2018        Immunization History   Administered Date(s) Administered   • COVID-19 (MODERNA) 02/10/2021, 03/19/2021   • Influenza, Unspecified 09/26/2019   • PPD Test 04/13/2017   • Pneumococcal Polysaccharide (PPSV23) 10/30/2019       No Known Allergies       Current Outpatient Medications:   •  albuterol sulfate  (90 Base) MCG/ACT inhaler, Inhale 2 puffs Every 6 (Six) Hours As Needed., Disp: , Rfl:   •  aspirin (aspirin) 81 MG EC tablet, aspirin 81 mg oral tablet,delayed release (DR/EC) take 1 tablet (81 mg) by oral route once daily   Active, Disp: , Rfl:   •  carvedilol (COREG) 6.25 MG tablet, Take 6.25 mg by mouth., Disp: , Rfl:   •  Fluticasone-Umeclidin-Vilant (Trelegy Ellipta) 200-62.5-25 MCG/INH inhaler, Inhale 1 puff Daily for 90 days. Rinse mouth out after each use., Disp: 3 each, Rfl: 3  •  furosemide (LASIX) 40 MG tablet, furosemide 40 mg oral tablet take 1 tablet by oral route 2 times a day for 90 days 2/8/2021  Active, Disp: , Rfl:   •  insulin aspart (NovoLOG) 100 UNIT/ML injection, Inject per high dose sliding scale - max dose of 36 units per day., Disp: 10 mL, Rfl: 3  •  Klor-Con 8 MEQ CR tablet, Take 1 tablet by mouth Daily., Disp: 30 tablet, Rfl: 0  •  levalbuterol  "(XOPENEX) 1.25 MG/3ML nebulizer solution, 3 mL., Disp: , Rfl:   •  losartan (COZAAR) 25 MG tablet, losartan 25 mg oral tablet take 1 tablet (25 mg) by oral route once daily 2/11/2021  Active, Disp: , Rfl:   •  metOLazone (ZAROXOLYN) 5 MG tablet, Take 5 mg by mouth., Disp: , Rfl:   •  amoxicillin-clavulanate (Augmentin) 875-125 MG per tablet, Take 1 tablet by mouth 2 (Two) Times a Day for 7 days., Disp: 14 tablet, Rfl: 0  •  sodium chloride 3 % nebulizer solution, Take 4 mL by nebulization 2 (Two) Times a Day for 90 days., Disp: 720 mL, Rfl: 3     Objective     Physical Exam  Vital Signs Reviewed  WDWN, Alert, NAD.    HEENT:  PERRL, EOMI.  OP, nares clear, no sinus tenderness  Neck:  Supple, no JVD, no thyromegaly  Lymph: no axillary, cervical, supraclavicular lymphadenopathy noted bilaterally  Chest: mildly decreased breath sounds and diffuse wheezes throughout. No rales or rhonchi appreciated.  Normal work of breathing noted.  Patient is able speak full sentences without difficulty.  CV: RRR, no MGR, pulses 2+, equal.  Abd:  Soft, NT, ND, + BS, no HSM  EXT:  no clubbing, no cyanosis, no edema, no joint tenderness  Neuro:  A&Ox3, CN grossly intact, no focal deficits.  Skin: maculopapular rash on nose bridge, no lesions noted.      Vital Signs:   /69 (BP Location: Left arm, Patient Position: Sitting, Cuff Size: Adult)   Pulse 84   Temp 97.8 °F (36.6 °C) (Temporal)   Resp 17   Ht 149.9 cm (59\")   Wt 60.3 kg (133 lb)   SpO2 99% Comment: nasal canula 2 liters  BMI 26.86 kg/m²         Result Review :   I have personally reviewed Dr. Del Real's last office visit note.         Assessment and Plan      Assessment:  1. Bronchiectasis with acute exacerbation.       2. History of chronic mycobacterium avium disease unable to tolerate triple antibiotics therapy.       3. Difficulty with airway clearance.      4. History of end stage renal disease recently on dialysis now off dialysis followed by Dr. Chaney.     "   5. Congestive heart failure under the care of Dr. Shultz.       6. Acute on chronic hypoxic respiratory failure on continuous oxygen.      7. Tobacco abuse of cigarettes in remission, the patient reports she quit smoking in 1985.      8. Rash on nose.     Plan:  1.  Patient is advised that she cannot take Anoro and Trelegy Ellipta inhaler at the same time.  We will stop Anoro inhaler.  Patient to continue Trelegy Ellipta inhaler 1 puff once a day and rinse mouth out after each use.  Recommended switching over nebulizer treatments however the patient did not feel the nebulizer treatment were beneficial and prefers inhalers.      2. I will restart the patient on sodium chloride nebulizer treatments to use twice daily with her flutter valve. The patient is advised to use her flutter valve twice daily with levalbuterol and sodium chloride nebulizer to assist with airway clearance.       3. Continue supplemental oxygen to keep oxygen saturation at or above 89%.      4.  For patient's bronchiectasis with acute exacerbation I will start patient on Augmentin.  I will also order a chest x-ray, CBC, and sputum culture.  Patient states she already has steroids on hand at home that she will take.  Expectations and course of treatment discussed with the patient. How to take medications and possible side effects of medications discussed with the patient. Patient verbalized understanding and compliance.    5. Follow up with Dr. Chaney for chronic kidney disease as scheduled.       6. Follow up with Dr. Shultz for congestive heart failure.       7. The patient is advised to call the office, call 911 or go to the ER for any new or worsening symptoms.   8.  Offered to refer patient to dermatology for rash on nose bridge however patient states she will follow-up with her primary care provider and declined referral at this time.  9.  Offered to have patient set up with humidification for her oxygen however patient declined humidification  for her oxygen at this time.  10.  Patient reports they are up-to-date with flu, pneumonia, and Covid vaccines.  Patient is advised to continue to follow CDC recommendations such as social distancing wearing a mask and washing hands for at least 20 seconds.        11. Continue Singulair everyday as prescribed.       12.  Follow-up in 3 to 4 weeks, sooner if needed.            Follow Up   Return for 3-4 weeks with Dr. Del Real.  Patient was given instructions and counseling regarding her condition or for health maintenance advice. Please see specific information pulled into the AVS if appropriate.

## 2021-09-14 PROBLEM — N18.6 END STAGE RENAL DISEASE (HCC): Status: ACTIVE | Noted: 2021-01-01

## 2021-09-14 PROBLEM — Z86.19 HISTORY OF MYCOBACTERIUM AVIUM COMPLEX INFECTION: Status: ACTIVE | Noted: 2021-01-01

## 2021-09-14 PROBLEM — J47.9 BRONCHIECTASIS WITHOUT COMPLICATION (HCC): Status: ACTIVE | Noted: 2021-01-01

## 2021-09-14 PROBLEM — R06.89 AIRWAY CLEARANCE IMPAIRMENT: Status: ACTIVE | Noted: 2021-01-01

## 2021-09-14 PROBLEM — R21 RASH: Status: ACTIVE | Noted: 2021-01-01

## 2021-09-14 PROBLEM — I50.9 CONGESTIVE HEART FAILURE (HCC): Status: ACTIVE | Noted: 2021-01-01

## 2021-09-14 PROBLEM — J96.11 CHRONIC RESPIRATORY FAILURE WITH HYPOXIA (HCC): Status: ACTIVE | Noted: 2021-01-01

## 2021-09-14 PROBLEM — F17.201 TOBACCO ABUSE, IN REMISSION: Status: ACTIVE | Noted: 2021-01-01

## 2021-09-15 NOTE — PROGRESS NOTES
[facility]    Today's Date: 09/15/21    Patient Name: Carrington Ledbettre  MRN: [id number]  CSN: [id number]  PCP: Jeni Pope DO  Referring Provider: No ref. provider found    SUBJECTIVE     Chief Complaint   Patient presents with   • Right Foot - Nail Problem   • Left Foot - Nail Problem     HPI: Carrington Ledbetter, a 89 y.o.female, comes to clinic.    New, Established, New Problem:  established     Location:  Toenails    Duration:   Greater than five years    Onset:  Gradual    Nature:  sore with palpation.    Stable, worsening, improving:   Stable    Aggravating factors:  Pain with shoe gear and ambulation.    Previous Treatment:  debridement  __________________________________    New, Established, New Problem:  Established    Location:  bilateral feet    Duration:    Onset:  gradual    Nature:   NIDDM     Stable, worsening, improving:  stable    Patient reported last blood glucose: 120  __________________________________    Patient relates no medical changes since their last visit.    No other pedal complaints at this time.    Patient denies any fevers, chills, nausea, vomiting, shortness of breathe, nor any other constitutional signs nor symptoms.       Past Medical History:   Diagnosis Date   • Ankle pain    • Arthritis    • Dialysis patient (CMS/[facility])    • Difficulty walking 01/17/2019   • DM type 2 (diabetes mellitus, type 2) (CMS/[facility])    • Foot pain, bilateral 03/06/2018   • Hammer toe    • HBP (high blood pressure)    • Ingrowing nail 03/06/2018   • Kidney failure    • Oxygen dependent    • Oxygen desaturation during sleep    • Tinea unguium 03/06/2018     Past Surgical History:   Procedure Laterality Date   • APPENDECTOMY     • CHOLECYSTECTOMY     • HYSTERECTOMY     • PACEMAKER IMPLANTATION       Family History   Problem Relation Age of Onset   • Stroke Mother    • Heart disease Mother    • Diabetes type II Mother    • Heart disease Brother    • Diabetes type II Brother      Social  History     Socioeconomic History   • Marital status:      Spouse name: Not on file   • Number of children: Not on file   • Years of education: Not on file   • Highest education level: Not on file   Tobacco Use   • Smoking status: Former Smoker     Packs/day: 2.00     Types: Cigarettes   • Smokeless tobacco: Never Used   • Tobacco comment: quit at age 60   Vaping Use   • Vaping Use: Never used   Substance and Sexual Activity   • Alcohol use: Yes     Comment: light   • Drug use: Never   • Sexual activity: Defer     No Known Allergies  Current Outpatient Medications   Medication Sig Dispense Refill   • albuterol sulfate  (90 Base) MCG/ACT inhaler Inhale 2 puffs Every 6 (Six) Hours As Needed.     • amoxicillin-clavulanate (Augmentin) 875-125 MG per tablet Take 1 tablet by mouth 2 (Two) Times a Day for 7 days. 14 tablet 0   • aspirin (aspirin) 81 MG EC tablet aspirin 81 mg oral tablet,delayed release (DR/EC) take 1 tablet (81 mg) by oral route once daily   Active     • carvedilol (COREG) 6.25 MG tablet Take 6.25 mg by mouth.     • Fluticasone-Umeclidin-Vilant (Trelegy Ellipta) 200-62.5-25 MCG/INH inhaler Inhale 1 puff Daily for 90 days. Rinse mouth out after each use. 3 each 3   • furosemide (LASIX) 40 MG tablet furosemide 40 mg oral tablet take 1 tablet by oral route 2 times a day for 90 days 2/8/2021  Active     • Klor-Con 8 MEQ CR tablet Take 1 tablet by mouth Daily. 30 tablet 0   • levalbuterol (XOPENEX) 1.25 MG/3ML nebulizer solution 3 mL.     • losartan (COZAAR) 25 MG tablet losartan 25 mg oral tablet take 1 tablet (25 mg) by oral route once daily 2/11/2021  Active     • metOLazone (ZAROXOLYN) 5 MG tablet Take 5 mg by mouth.     • sodium chloride 3 % nebulizer solution Take 4 mL by nebulization 2 (Two) Times a Day for 90 days. 720 mL 3   • insulin aspart (NovoLOG) 100 UNIT/ML injection Inject per high dose sliding scale - max dose of 36 units per day. 10 mL 3     No current facility-administered  medications for this visit.     Review of Systems   Constitutional: Negative.    Skin:        Painful toenails bilaterally   All other systems reviewed and are negative.      OBJECTIVE     Vitals:    09/15/21 1036   BP: (!) 151/105   Pulse: 91   Temp: 96.9 °F (36.1 °C)   SpO2: 92%       Lab Results   Component Value Date    HGBA1C 6.1 (H) 2021       Lab Results   Component Value Date    GLUCOSE 115 (H) 2021    CALCIUM 9.2 2021     2021    K 4.7 2021    CO2 23.6 2021     2021    BUN 77 (H) 2021    CREATININE 2.17 (H) 2021    EGFRIFAFRI 25 (L) 2021    BCR 35.5 (H) 2021    ANIONGAP 14.4 2021       Patient seen in no apparent distress.      PHYSICAL EXAM:     Foot/Ankle Exam:       General:   Diabetic Foot Exam Performed    Appearance: elderly    Appearance comment:  Utilizing nasal oxygen  Orientation: AAOx3    Affect: appropriate    Assistance: cane    Shoe Gear:  Casual shoes    VASCULAR      Right Foot Vascularity   Normal vascular exam    Dorsalis pedis:  2+  Posterior tibial:  2+  Skin Temperature: warm    Edema Gradin+  CFT:  < 3 seconds  Pedal Hair Growth:  Present  Varicosities: none       Left Foot Vascularity   Normal vascular exam    Dorsalis pedis:  2+  Posterior tibial:  2+  Skin Temperature: warm    Edema Gradin+  CFT:  < 3 seconds  Pedal Hair Growth:  Present  Varicosities: none        NEUROLOGIC     Right Foot Neurologic   Normal sensation    Light touch sensation:  Normal  Vibratory sensation:  Normal  Hot/Cold sensation: normal       Left Foot Neurologic   Normal sensation    Light touch sensation:  Normal  Vibratory sensation:  Normal  Hot/cold sensation: normal       MUSCLE STRENGTH     Right Foot Muscle Strength   Foot dorsiflexion:  4+  Foot plantar flexion:  4+  Foot inversion:  4+  Foot eversion:  4+     Left Foot Muscle Strength   Foot dorsiflexion:  4+  Foot plantar flexion:  4+  Foot inversion:   4+  Foot eversion:  4+     DERMATOLOGIC     Right Foot Dermatologic   Skin: skin intact    Nails: onychomycosis, abnormally thick, subungual debris, dystrophic nails and ingrown toenail    Nails comment:  Toenails 1, 2, 3, and 5     Left Foot Dermatologic   Skin: skin intact    Nails: onychomycosis, abnormally thick, subungual debris, dystrophic nails and ingrown toenail    Nails comment:  Toenails 1, 2, 3, and 5      ASSESSMENT/PLAN     Diagnoses and all orders for this visit:    1. Foot pain, bilateral (Primary)    2. Onychomycosis    3. Onychocryptosis    4. Difficulty walking    5. Non-insulin dependent type 2 diabetes mellitus (CMS/[facility])    6. Diabetic foot (CMS/[facility])        Comprehensive lower extremity examination and evaluation was performed.    Discussed findings and treatment plan including risks, benefits, and treatment options with patient in detail. Patient agreed with treatment plan.    Toenails 1, 2, 3, 5 bilaterally were debrided in thickness and length and then smoothed with a Dremel Tool.  Tolerated the procedure well without complications.    Diabetic foot exam performed and documented this date, compliant with CQM required standards. Detail of findings as noted in physical exam.  Lower extremity Neurologic exam for diabetic patient performed and documented this date, compliant with PQRS required standards. Detail of findings as noted in physical exam.  Advised patient importance of good routine lower extremity hygiene. Advised patient importance of evaluating for intact skin and pain free nail borders.  Advised patient to use mirror to evaluate plantar/ soles of feet for better visualization. Advised patient monitor and phone office to be seen if any cracking to skin, open lesions, painful nail borders or if nails become elongated prior to next visit. Advised patient importance of daily cleansing of lower extremities, followed by good skin cream to maintain normal hydration of skin. Also advised  patient importance of close daily monitoring of blood sugar. Advised to regulate diet and medications to maintain control of blood sugar in optimal range. Contact primary care provider if difficulties maintaining blood sugar levels.  Advised Patient of presence of Diabetes Mellitus condition.  Advised Patient risk of progression and worsening or improvement, then return of condition.  Will monitor condition for any change in future. Treat with most appropriate treatment pending status of condition.  Counseled and advised patient extensively on nature and ramifications of diabetes. Standard instructions given to patient for good diabetic foot care and maintenance. Advised importance of careful monitoring to avoid break down and complications secondary to diabetes. Advised patient importance of strict maintenance of blood sugar control. Advised patient of possible ominous results from neglect of condition, i.e.: amputation/ loss of digits, feet and legs, or even death.  Patient states understands counseling, will monitor closely, continue good hygiene and routine diabetic foot care. Patient will contact office is questions or problems.      An After Visit Summary was printed and given to the patient at discharge, including (if requested) any available informative/educational handouts regarding diagnosis, treatment, or medications. All questions were answered to patient/family satisfaction. Should symptoms fail to improve or worsen they agree to call or return to clinic or to go to the Emergency Department. Discussed the importance of following up with any needed screening tests/labs/specialist appointments and any requested follow-up recommended by me today. Importance of maintaining follow-up discussed and patient accepts that missed appointments can delay diagnosis and potentially lead to worsening of conditions.    Return in about 5 weeks (around 10/20/2021) for Toenail Care., or sooner if acute issues  arise.    This document has been electronically signed by Cosmo Garcia DPM on September 15, 2021 10:50 EDT

## 2021-09-17 PROBLEM — M79.2 NEURALGIA AND NEURITIS, UNSPECIFIED: Status: ACTIVE | Noted: 2018-03-07

## 2021-09-17 PROBLEM — R26.2 DIFFICULTY WALKING: Status: ACTIVE | Noted: 2019-01-17

## 2021-09-17 PROBLEM — M15.9 POLYOSTEOARTHRITIS: Status: ACTIVE | Noted: 2017-02-22

## 2021-09-17 PROBLEM — M79.672 FOOT PAIN, BILATERAL: Status: ACTIVE | Noted: 2018-03-06

## 2021-09-17 PROBLEM — M79.671 FOOT PAIN, BILATERAL: Status: ACTIVE | Noted: 2018-03-06

## 2021-09-17 PROBLEM — M10.9 GOUT: Status: ACTIVE | Noted: 2018-02-16

## 2021-09-17 PROBLEM — B35.1 TINEA UNGUIUM: Status: ACTIVE | Noted: 2018-03-06

## 2021-09-17 NOTE — PROGRESS NOTES
Chief Complaint  Rash    Subjective      Burnis JAYSON Ledbetter presents to [facility]     History of Present Illness    1.) NASAL LESION : Onset - 1 mo ago. No known inciting event. Patient reports initially noticing what she describes as a pustule with eventual erythema of skin of her entire nose. She reports tenderness with palpation of both the outer and inner surface of her nasal cavity.    2.) VAGINITIS : Requesting a refill of Diflucan. Complaining of vaginal discharge concerning of a yeast infection.    Objective      Vital Signs:     /82   Pulse 97   Temp 97.3 °F (36.3 °C)   Wt 62.6 kg (138 lb)   SpO2 97%   BMI 27.87 kg/m²       Physical Exam  Vitals reviewed.   Constitutional:       General: She is not in acute distress.     Appearance: Normal appearance. She is well-developed.   HENT:      Head: Normocephalic and atraumatic.      Right Ear: Hearing and external ear normal.      Left Ear: Hearing and external ear normal.      Nose: Nose normal.   Eyes:      General: Lids are normal.         Right eye: No discharge.         Left eye: No discharge.      Conjunctiva/sclera: Conjunctivae normal.   Pulmonary:      Effort: Pulmonary effort is normal.   Abdominal:      General: There is no distension.   Musculoskeletal:         General: No swelling.      Cervical back: Neck supple.   Skin:     Comments: Erythema and crusting appreciated of nasal region. Some areas of crusting.   Neurological:      Mental Status: She is alert. Mental status is at baseline.   Psychiatric:         Mood and Affect: Mood and affect normal.         Thought Content: Thought content normal.       Assessment and Plan    Diagnoses and all orders for this visit:    1. Rash of face (Primary)  Comments:  1.) Recommend topical steroid and antifungal. Referred to dermatology for an evaluation and recommendation.  Orders:  -     Ambulatory Referral to Dermatology    2. Acute vaginitis    Other orders  -      fluconazole (Diflucan) 150 MG tablet; Take 1 tablet by mouth Daily for 7 days.  Dispense: 7 tablet; Refill: 0    Patient was given instructions and counseling regarding her condition or for health maintenance advice. Please see specific information pulled into the AVS if appropriate.

## 2021-09-30 NOTE — TELEPHONE ENCOUNTER
Caller: Carrington Ledbetter    Relationship: Self    Best call back number: [phone]     What was the call regarding: PATIENT CALLING BACK TO DISCUSS HER DERMATOLOGY APPT SHE RECENTLY WENT TO PER DR HOWARD REQUEST     Do you require a callback: YES         ”

## 2021-10-04 NOTE — PROGRESS NOTES
Chief Complaint    Rash (f/u rash on face)    Subjective      Burnmigel Ledbetter presents to [facility]     History of Present Illness    1.) RASH : Patient was recently evaluated here in office regarding a rash of her nasal cavity. Referred to dermatology - diagnosed with impetigo - currently prescribed topical gentamicin. Patient reports that she feels as if the rash is improving. She continues to experience xerosis and inflammation of her nasal cavity. She reports worsening rhinitis, which solidifies during the course of the night. She notes that she has been 'taking off my oxygen at night time' due to the irritation.     Objective      Vital Signs:     /84   Pulse 83   Temp 97.1 °F (36.2 °C)   Wt 60.3 kg (133 lb)   SpO2 95%   BMI 26.86 kg/m²       Physical Exam  Vitals reviewed.   Constitutional:       General: She is not in acute distress.     Appearance: Normal appearance. She is well-developed.   HENT:      Head: Normocephalic and atraumatic.      Right Ear: Hearing and external ear normal.      Left Ear: Hearing and external ear normal.      Nose: Nose normal.   Eyes:      General: Lids are normal.         Right eye: No discharge.         Left eye: No discharge.      Conjunctiva/sclera: Conjunctivae normal.   Pulmonary:      Effort: Pulmonary effort is normal.   Abdominal:      General: There is no distension.   Musculoskeletal:         General: No swelling.      Cervical back: Neck supple.   Skin:     Coloration: Skin is not jaundiced.      Findings: No erythema.      Comments: Erythema of skin of nasal cavity - appears to be improving compared to most recent visit   Neurological:      Mental Status: She is alert. Mental status is at baseline.   Psychiatric:         Mood and Affect: Mood and affect normal.         Thought Content: Thought content normal.     Assessment and Plan    Diagnoses and all orders for this visit:    1. Rash of face (Primary)  Comments:  1.)  Continue topical abx - follow up with dermatology - follow up with primary care after visit with derm via video visit.    2. Rhinitis, unspecified type  Comments:  1.) Recommend re-starting Flonase (+) intermittent use of PRN nasal saline. Also recommmend intermittent flush of nasal cavity (once a week).    3. Nasal dryness  Comments:  1.) Recommmend nasal saline + humidifier.     Follow Up     Return in about 2 weeks (around 10/18/2021) for Video visit regarding rash.   Patient was given instructions and counseling regarding her condition or for health maintenance advice. Please see specific information pulled into the AVS if appropriate.

## 2021-10-12 NOTE — PATIENT INSTRUCTIONS
Bronchiectasis    Bronchiectasis is a condition in which the airways in the lungs (bronchi) are damaged and widened. The condition makes it hard for the lungs to get rid of mucus, and it causes mucus to gather in the bronchi. This condition often leads to lung infections, which can make the condition worse.  What are the causes?  You can be born with this condition or you can develop it later in life. Common causes of this condition include:  · Cystic fibrosis.  · Repeated lung infections, such as pneumonia or tuberculosis.  · An object or other blockage in the lungs.  · Breathing in fluid, food, or other objects (aspiration).  · A problem with the immune system and lung structure that is present at birth (congenital).  Sometimes the cause is not known.  What are the signs or symptoms?  Common symptoms of this condition include:  · A daily cough that brings up mucus and lasts for more than 3 weeks.  · Lung infections that happen often.  · Shortness of breath and wheezing.  · Weakness and fatigue.  How is this diagnosed?  This condition is diagnosed with tests, such as:  · Chest X-rays or CT scans. These are done to check for changes in the lungs.  · Breathing tests. These are done to check how well your lungs are working.  · A test of a sample of your saliva (sputum culture). This test is done to check for infection.  · Blood tests and other tests. These are done to check for related diseases or causes.  How is this treated?  Treatment for this condition depends on the severity of the illness and its cause. Treatment may include:  · Medicines that loosen mucus so it can be coughed up (expectorants).  · Medicines that relax the muscles of the bronchi (bronchodilators).  · Antibiotic medicines to prevent or treat infection.  · Physical therapy to help clear mucus from the lungs. Techniques may include:  ? Postural drainage. This is when you sit or lie in certain positions so that mucus can drain by gravity.  ? Chest  percussion. This involves tapping the chest or back with a cupped hand.  ? Chest vibration. For this therapy, a hand or special equipment vibrates your chest and back.  · Surgery to remove the affected part of the lung. This may be done in severe cases.  Follow these instructions at home:  Medicines  · Take over-the-counter and prescription medicines only as told by your health care provider.  · If you were prescribed an antibiotic medicine, take it as told by your health care provider. Do not stop taking the antibiotic even if you start to feel better.  · Avoid taking sedatives and antihistamines unless your health care provider tells you to take them. These medicines tend to thicken the mucus in the lungs.  Managing symptoms  · Perform breathing exercises or techniques to clear your lungs as told by your health care provider.  · Consider using a cold steam vaporizer or humidifier in your room or home to help loosen secretions.  · If you have a cough that gets worse at night, try sleeping in a semi-upright position.  General instructions  · Get plenty of rest.  · Drink enough fluid to keep your urine clear or pale yellow.  · Stay inside when pollution and ozone levels are high.  · Stay up to date with vaccinations and immunizations.  · Avoid cigarette smoke and other lung irritants.  · Do not use any products that contain nicotine or tobacco, such as cigarettes and e-cigarettes. If you need help quitting, ask your health care provider.  · Keep all follow-up visits as told by your health care provider. This is important.  Contact a health care provider if:  · You cough up more sputum than before and the sputum is yellow or green in color.  · You have a fever.  · You cannot control your cough and are losing sleep.  Get help right away if:  · You cough up blood.  · You have chest pain.  · You have increasing shortness of breath.  · You have pain that gets worse or is not controlled with medicines.  · You have a fever  and your symptoms suddenly get worse.  Summary  · Bronchiectasis is a condition in which the airways in the lungs (bronchi) are damaged and widened. The condition makes it hard for the lungs to get rid of mucus, and it causes mucus to gather in the bronchi.  · Treatment usually includes therapy to help clear mucus from the lungs.  · Stay up to date with vaccinations and immunizations.  This information is not intended to replace advice given to you by your health care provider. Make sure you discuss any questions you have with your health care provider.  Document Revised: 11/30/2018 Document Reviewed: 01/22/2018  Elsevier Patient Education © 2021 [employer]

## 2021-10-12 NOTE — PROGRESS NOTES
Progress note    CHIEF COMPLAINT  Chief Complaint   Patient presents with   • Follow-up     3-4 week    • Cough   • Shortness of Breath   • Wheezing        Primary Care Provider  Jeni Pope DO     Referring Provider  No ref. provider found    Patient Complaint    ICD-10-CM ICD-9-CM   1. Congestive heart failure, unspecified HF chronicity, unspecified heart failure type ([facility])  I50.9 428.0   2. Vitamin D deficiency  E55.9 268.9   3. Gastro-esophageal reflux disease without esophagitis  K21.9 530.81   4. End stage renal disease ([facility])  N18.6 585.6   5. Dialysis patient ([facility])  Z99.2 V45.11   6. Anemia in chronic kidney disease, unspecified CKD stage  N18.9 285.21    D63.1    7. Arthritis  M19.90 716.90   8. Chronic respiratory failure with hypoxia ([facility])  J96.11 518.83     799.02   9. Airway clearance impairment  R06.89 786.09   10. Bronchiectasis without complication ([facility])  J47.9 494.0   11. Oxygen dependent  Z99.81 V46.2   12. Chronic obstructive pulmonary disease, unspecified COPD type ([facility])  J44.9 496   13. Tobacco abuse, in remission  F17.201 V15.82   14. History of Mycobacterium avium complex infection  Z86.19 V12.09            Subjective          History of Presenting Illness  Carrington Ledbetter is a 89 y.o. female being followed by  and Ms. Jacqueline Garcia, nurse practitioner.  Patient has history of Mycobacterium avium intracellulare and not able to tolerate the medications and being followed conservatively.  Patient has significant bronchiectasis for which patient was tried on various measures and also had a bronchoscopy with bronchial wash in the past.  Patient was tried on vest therapy which the patient absolutely does not want to have it done.  Patient is on flutter valve and the patient says that is sometimes helping sometimes not and feels that phlegm is stuck in the chest.  Not able to bring it up and wheezes all the time.  Patient was tried on the nebulizer therapies which the patient absolutely  do not want to have it.  At present patient is on Trelegy and Xopenex nebulizer which helps her some.  Patient had the alpha-1 antitrypsin which was reported as normal with a normal levels.  No chest x-ray was done recently the last 1 is available is in March 11, 2021.  Patient apparently had a sleep study done in the past and was on CPAP which the patient absolutely do not want to do anything at this time.  At present patient denies of having any fever, chills, sweating or any exposed anybody who is sick around her any recent travel history.  Denied any chest pain, palpitation or any other related diaphoretic symptoms.  Denied any loss of taste and smell.  Other review the systems are noncontributory.    I have personally reviewed the review of systems, past family, social, medical and surgical histories; and agree with their findings.    HISTORY    Family History   Problem Relation Age of Onset   • Stroke Mother    • Heart disease Mother    • Diabetes type II Mother    • Heart disease Brother    • Diabetes type II Brother         Social History     Socioeconomic History   • Marital status:    Tobacco Use   • Smoking status: Former Smoker     Packs/day: 2.00     Types: Cigarettes   • Smokeless tobacco: Never Used   • Tobacco comment: quit at age 60   Vaping Use   • Vaping Use: Never used   Substance and Sexual Activity   • Alcohol use: Yes     Comment: light   • Drug use: Never   • Sexual activity: Defer        Past Medical History:   Diagnosis Date   • Arthritis    • Dialysis patient ([facility])    • Difficulty walking 01/17/2019   • DM type 2 (diabetes mellitus, type 2) ([facility])    • Foot pain, bilateral 03/06/2018   • Hammer toe    • HBP (high blood pressure)    • Kidney failure    • Oxygen dependent    • Oxygen desaturation during sleep         Immunization History   Administered Date(s) Administered   • COVID-19 (MODERNA) 02/10/2021, 03/19/2021   • Fluzone High-Dose 65+yrs 10/02/2021   • Influenza, Unspecified  09/26/2019   • PPD Test 04/13/2017   • Pneumococcal Polysaccharide (PPSV23) 10/30/2019       No Known Allergies       Current Outpatient Medications:   •  albuterol sulfate  (90 Base) MCG/ACT inhaler, Inhale 2 puffs Every 6 (Six) Hours As Needed., Disp: , Rfl:   •  aspirin (aspirin) 81 MG EC tablet, aspirin 81 mg oral tablet,delayed release (DR/EC) take 1 tablet (81 mg) by oral route once daily   Active, Disp: , Rfl:   •  carvedilol (COREG) 6.25 MG tablet, Take 6.25 mg by mouth., Disp: , Rfl:   •  ferrous sulfate 325 (65 FE) MG tablet, Take 325 mg by mouth., Disp: , Rfl:   •  Fluticasone-Umeclidin-Vilant (Trelegy Ellipta) 200-62.5-25 MCG/INH inhaler, Inhale 1 puff Daily for 90 days. Rinse mouth out after each use., Disp: 3 each, Rfl: 3  •  furosemide (LASIX) 40 MG tablet, furosemide 40 mg oral tablet take 1 tablet by oral route 2 times a day for 90 days 2/8/2021  Active, Disp: , Rfl:   •  gentamicin (GARAMYCIN) 0.1 % ointment, Apply 1 application topically to the appropriate area as directed 2 (two) times a day., Disp: , Rfl:   •  insulin aspart (NovoLOG) 100 UNIT/ML injection, Inject per high dose sliding scale - max dose of 36 units per day., Disp: 10 mL, Rfl: 3  •  insulin lispro (humaLOG) 100 UNIT/ML injection, Inject  under the skin into the appropriate area as directed., Disp: , Rfl:   •  Klor-Con 8 MEQ CR tablet, Take 1 tablet by mouth Daily., Disp: 30 tablet, Rfl: 0  •  levalbuterol (XOPENEX) 1.25 MG/3ML nebulizer solution, 3 mL., Disp: , Rfl:   •  losartan (COZAAR) 25 MG tablet, losartan 25 mg oral tablet take 1 tablet (25 mg) by oral route once daily 2/11/2021  Active, Disp: , Rfl:   •  metOLazone (ZAROXOLYN) 5 MG tablet, Take 5 mg by mouth., Disp: , Rfl:   •  sodium chloride 3 % nebulizer solution, Take 4 mL by nebulization 2 (Two) Times a Day for 90 days., Disp: 720 mL, Rfl: 3     Smoking status: Former    Objective     Vital Signs:   Vitals:    10/12/21 1100   BP: 150/60   Pulse: 83   Resp: 18    Temp: 98 °F (36.7 °C)   SpO2: 97%        Physical Exam: Very pleasant female accompanied by her family member.  And on 2 L of oxygen.  Looks comfortable at rest.  HEENT: Atraumatic, anicteric.  Neck: Supple, no JVD.  Chest and lungs: Nontender.  Decreased breath sounds with bilateral scattered wheezing and rhonchi and nonlocalized.  Cardiovascular system: Regular rate and rhythm.  Abdomen: Soft and benign.  Extremities: No obvious edema.     Result Review :   I have personally reviewed the previous medical records and reports are as needed.         Impression:  Bronchiectasis  Pleural effusion  COPD  Congestive heart failure  End-stage renal disease  AUSTEN  Obstructive sleep apnea  Acute on chronic hypoxic respiratory failure  Former smoker  Normal alpha-1 antitrypsin with a normal phenotype    Plan:  Patient limited only selected medications and could not tolerate many of them and some she does not want it.  I discussed with the patient about the different options which the patient could not take it does not want it.  Continue the present therapy.  Steam inhalation and PDN P manual at home by the family.  Patient may need a bronchoscopy with inspection and bronchial wash if the things get worse.  Follow-up in 3 months or sooner with .  We will get the high-resolution CT scan of the chest done as she did not have it for a while for bronchiectasis..  Follow Up   Return in about 3 months (around 1/12/2022).  Patient was given instructions and counseling regarding her condition or for health maintenance advice. Please see specific information pulled into the AVS if appropriate.     Luis Eduardo Del Real MD

## 2021-10-13 NOTE — PROGRESS NOTES
Chief Complaint    FACIAL RASH      Subjective      Carrington Ledbetter presents to [facility]     History of Present Illness    1.) FACIAL RASH : Referred to dermatology. Prescribed Mupirocin. Patient reports that she feels as if her rash is improving 'slowly'. She is scheduled to follow up with dermatology on 10.19.21.    Objective      Vital Signs:     /78   Pulse 101   Temp 97.1 °F (36.2 °C)   Wt 60.8 kg (134 lb)   SpO2 99%   BMI 27.06 kg/m²       Physical Exam  Vitals reviewed.   Constitutional:       General: She is not in acute distress.     Appearance: Normal appearance. She is well-developed.   HENT:      Head: Normocephalic and atraumatic.      Right Ear: Hearing and external ear normal.      Left Ear: Hearing and external ear normal.      Nose: Nose normal.   Eyes:      General: Lids are normal.         Right eye: No discharge.         Left eye: No discharge.      Conjunctiva/sclera: Conjunctivae normal.   Pulmonary:      Effort: Pulmonary effort is normal.   Abdominal:      General: There is no distension.   Musculoskeletal:         General: No swelling.      Cervical back: Neck supple.   Skin:     Coloration: Skin is not jaundiced.      Findings: No erythema.      Comments: Erythema of nasal region. Erythematous papule x 1 of medial aspect of left forehead.   Neurological:      Mental Status: She is alert. Mental status is at baseline.   Psychiatric:         Mood and Affect: Mood and affect normal.         Thought Content: Thought content normal.     Assessment and Plan    Diagnoses and all orders for this visit:    1. Facial rash (Primary)  Comments:  1.) Continue topical mupuricin. Follow up with dermatology. History of COPD - requesting refill of Azithromycin.    Other orders  -     fluconazole (Diflucan) 150 MG tablet; Take 1 tablet by mouth 1 (One) Time for 1 dose.  Dispense: 14 tablet; Refill: 0  -     azithromycin (Zithromax Z-Sanket) 250 MG tablet; Take 2  tablets by mouth on day 1, then 1 tablet daily on days 2-5  Dispense: 6 tablet; Refill: 0    · C/o vaginitis during last visit - Diflucan not sent as promised during that visit. Resent as noted above.    Patient was given instructions and counseling regarding her condition or for health maintenance advice. Please see specific information pulled into the AVS if appropriate.

## 2021-10-19 PROBLEM — I50.22 CHRONIC HFREF (HEART FAILURE WITH REDUCED EJECTION FRACTION): Status: ACTIVE | Noted: 2021-01-01

## 2021-10-19 PROBLEM — Z95.0 PRESENCE OF CARDIAC PACEMAKER: Status: ACTIVE | Noted: 2021-01-01

## 2021-10-20 PROBLEM — R26.2 DIFFICULTY WALKING: Status: RESOLVED | Noted: 2019-01-17 | Resolved: 2021-01-01

## 2021-10-20 PROBLEM — N18.6 ESRD (END STAGE RENAL DISEASE) ON DIALYSIS (HCC): Status: RESOLVED | Noted: 2021-01-01 | Resolved: 2021-01-01

## 2021-10-20 PROBLEM — Z99.2 ESRD (END STAGE RENAL DISEASE) ON DIALYSIS: Status: ACTIVE | Noted: 2021-01-01

## 2021-10-20 PROBLEM — R06.89 AIRWAY CLEARANCE IMPAIRMENT: Status: RESOLVED | Noted: 2021-01-01 | Resolved: 2021-01-01

## 2021-10-20 PROBLEM — M79.671 FOOT PAIN, BILATERAL: Status: RESOLVED | Noted: 2018-03-06 | Resolved: 2021-01-01

## 2021-10-20 PROBLEM — Z99.2 ESRD (END STAGE RENAL DISEASE) ON DIALYSIS (HCC): Status: RESOLVED | Noted: 2021-01-01 | Resolved: 2021-01-01

## 2021-10-20 PROBLEM — N18.4 CRF (CHRONIC RENAL FAILURE), STAGE 4 (SEVERE): Status: ACTIVE | Noted: 2021-01-01

## 2021-10-20 PROBLEM — R21 RASH: Status: RESOLVED | Noted: 2021-01-01 | Resolved: 2021-01-01

## 2021-10-20 PROBLEM — I50.22 CHRONIC HFREF (HEART FAILURE WITH REDUCED EJECTION FRACTION): Status: RESOLVED | Noted: 2021-01-01 | Resolved: 2021-01-01

## 2021-10-20 PROBLEM — M79.672 FOOT PAIN, BILATERAL: Status: RESOLVED | Noted: 2018-03-06 | Resolved: 2021-01-01

## 2021-10-20 NOTE — PROGRESS NOTES
Chief Complaint  Hypertension, Congestive Heart Failure, and Hyperlipidemia    Subjective    Patient has been doing well denies any chest pain or shortness of breath    Past Medical History:   Diagnosis Date   • Arthritis    • CHF (congestive heart failure) ([facility])    • Chronic HFrEF (heart failure with reduced ejection fraction) 9/14/2021   • Dialysis patient ([facility])    • Difficulty walking 01/17/2019   • DM type 2 (diabetes mellitus, type 2) ([facility])    • ESRD (end stage renal disease) on dialysis ([facility]) 9/14/2021   • Essential hypertension    • Foot pain, bilateral 03/06/2018   • Hammer toe    • HBP (high blood pressure)    • Hyperlipidemia    • Kidney failure    • Oxygen dependent    • Oxygen desaturation during sleep          Current Outpatient Medications:   •  albuterol sulfate  (90 Base) MCG/ACT inhaler, Inhale 2 puffs Every 6 (Six) Hours As Needed., Disp: , Rfl:   •  aspirin (aspirin) 81 MG EC tablet, aspirin 81 mg oral tablet,delayed release (DR/EC) take 1 tablet (81 mg) by oral route once daily   Active, Disp: , Rfl:   •  carvedilol (COREG) 6.25 MG tablet, Take 6.25 mg by mouth. Take 2 (two) pills once a day, Disp: , Rfl:   •  Fluticasone-Umeclidin-Vilant (Trelegy Ellipta) 200-62.5-25 MCG/INH inhaler, Inhale 1 puff Daily for 90 days. Rinse mouth out after each use., Disp: 3 each, Rfl: 3  •  furosemide (LASIX) 40 MG tablet, furosemide 40 mg oral tablet take 1 tablet by oral route 2 times a day for 90 days 2/8/2021  Active, Disp: , Rfl:   •  gentamicin (GARAMYCIN) 0.1 % ointment, Apply 1 application topically to the appropriate area as directed 2 (two) times a day., Disp: , Rfl:   •  insulin lispro (humaLOG) 100 UNIT/ML injection, Inject  under the skin into the appropriate area as directed., Disp: , Rfl:   •  Klor-Con 8 MEQ CR tablet, Take 1 tablet by mouth Daily., Disp: 30 tablet, Rfl: 0  •  losartan (COZAAR) 25 MG tablet, losartan 25 mg oral tablet take 1 tablet (25 mg) by oral route once daily 2/11/2021   "Active, Disp: , Rfl:   •  metOLazone (ZAROXOLYN) 5 MG tablet, Take 5 mg by mouth., Disp: , Rfl:   •  sodium chloride 3 % nebulizer solution, Take 4 mL by nebulization 2 (Two) Times a Day for 90 days., Disp: 720 mL, Rfl: 3    Medications Discontinued During This Encounter   Medication Reason   • azithromycin (Zithromax Z-Sanket) 250 MG tablet *Therapy completed   • ferrous sulfate 325 (65 FE) MG tablet    • insulin aspart (NovoLOG) 100 UNIT/ML injection    • levalbuterol (XOPENEX) 1.25 MG/3ML nebulizer solution      No Known Allergies     Social History     Tobacco Use   • Smoking status: Former Smoker     Packs/day: 2.00     Types: Cigarettes   • Smokeless tobacco: Never Used   • Tobacco comment: quit at age 60   Vaping Use   • Vaping Use: Never used   Substance Use Topics   • Alcohol use: Never   • Drug use: Never       Family History   Problem Relation Age of Onset   • Stroke Mother    • Heart disease Mother    • Diabetes type II Mother    • Heart disease Brother    • Diabetes type II Brother         Objective     /76   Pulse 76   Ht 149.9 cm (59\")   Wt 59.4 kg (131 lb)   BMI 26.46 kg/m²       Physical Exam    General Appearance:   · no acute distress  · Alert and oriented x3  HENT:   · lips not cyanotic  · Atraumatic  Neck:  · No jvd   · supple  Respiratory:  · no respiratory distress  · normal breath sounds  · no rales  Cardiovascular:  · Regular rate and rhythm  · no S3, no S4   · no murmur  · no rub  Extremities  · No cyanosis  · lower extremity edema: +1  Skin:   · warm, dry  · No rashes      Result Review :     No results found for: PROBNP  CMP    CMP 5/26/21 7/28/21 9/27/21   Glucose  115 (A) 113 (A)   Glucose 121 (A)     BUN 87 (A) 77 (A) 88 (A)   Creatinine 1.91 (A) 2.17 (A) 1.83 (A)   eGFR  Am  25 (A) 31 (A)   Sodium 144 144 144   Potassium 4.8 4.7 5.1   Chloride 101 106 105   Calcium 9.0 9.2 8.9   Albumin 3.8 4.30 4.00   Total Bilirubin 0.21 0.3 0.3   Alkaline Phosphatase 103 107 91   AST " (SGOT) 18 19 16   ALT (SGPT) 10 10 9   (A) Abnormal value       Comments are available for some flowsheets but are not being displayed.           CBC w/diff    CBC w/Diff 5/26/21 7/28/21 9/27/21   WBC 4.53 (A) 5.28 5.28   RBC 4.08 (A) 4.14 3.82   Hemoglobin 10.9 (A) 11.3 (A) 10.6 (A)   Hematocrit 35.0 (A) 35.3 32.7 (A)   MCV 85.8 85.3 85.6   MCH 26.7 (A) 27.3 27.7   MCHC 31.1 (A) 32.0 32.4   RDW 15.4 (A) 15.4 15.1   Platelets 192 178 203   Neutrophil Rel % 51.6 54.7 53.6   Immature Granulocyte Rel %  0.2 0.2   Lymphocyte Rel % 34.4 35.0 33.0   Monocyte Rel % 10.2 (A) 7.0 9.8   Eosinophil Rel % 2.9 2.7 3.0   Basophil Rel % 0.7 0.4 0.4   (A) Abnormal value             No results found for: TSH   No results found for: FREET4   No results found for: DDIMERQUANT  No results found for: MG   No results found for: DIGOXIN   Lab Results   Component Value Date    TROPONINT 0.03 03/11/2021           Lipid Panel    Lipid Panel 5/17/21   Total Cholesterol 196   Triglycerides 75   HDL Cholesterol 82 (A)   VLDL Cholesterol 15   LDL Cholesterol  99   (A) Abnormal value       Comments are available for some flowsheets but are not being displayed.           No results found for: POCTROP  Device interrogated dual-chamber pacemaker working normally atrially paced 39% time right ventricularly paced 9% of time  Patient 1 atrial tach episode 3 minutes no change made advised                 Diagnoses and all orders for this visit:    1. Chronic HFrEF (heart failure with reduced ejection fraction) (Primary)  Assessment & Plan:  Patient is symptomatically stable continue with her current dose of Coreg and losartan.  Volume wise no significant weight gain on Lasix 40 mg daily further adjustment as needed per nephrology      2. Presence of cardiac pacemaker dual chamber  Assessment & Plan:  Dual-chamber pacemaker working properly repeat interrogation 6 months      3. Essential hypertension    4. Mixed hyperlipidemia          Follow Up      Return in about 6 months (around 4/20/2022).          Patient was given instructions and counseling regarding her condition or for health maintenance advice. Please see specific information pulled into the AVS if appropriate.

## 2021-10-20 NOTE — PATIENT INSTRUCTIONS
Heart Failure, Self Care  Heart failure is a serious condition. This sheet explains things you need to do to take care of yourself at home. To help you stay as healthy as possible, you may be asked to change your diet, take certain medicines, and make other changes in your life. Your doctor may also give you more specific instructions. If you have problems or questions, call your doctor.  What are the risks?  Having heart failure makes it more likely for you to have some problems. These problems can get worse if you do not take good care of yourself. Problems may include:  · Blood clotting problems. This may cause a stroke.  · Damage to the kidneys, liver, or lungs.  · Abnormal heart rhythms.  Supplies needed:  · Scale for weighing yourself.  · Blood pressure monitor.  · Notebook.  · Medicines.  How to care for yourself when you have heart failure  Medicines  Take over-the-counter and prescription medicines only as told by your doctor. Take your medicines every day.  · Do not stop taking your medicine unless your doctor tells you to do so.  · Do not skip any medicines.  · Get your prescriptions refilled before you run out of medicine. This is important.  Eating and drinking    · Eat heart-healthy foods. Talk with a diet specialist (dietitian) to create an eating plan.  · Choose foods that:  ? Have no trans fat.  ? Are low in saturated fat and cholesterol.  · Choose healthy foods, such as:  ? Fresh or frozen fruits and vegetables.  ? Fish.  ? Low-fat (lean) meats.  ? Legumes, such as beans, peas, and lentils.  ? Fat-free or low-fat dairy products.  ? Whole-grain foods.  ? High-fiber foods.  · Limit salt (sodium) if told by your doctor. Ask your diet specialist to tell you which seasonings are healthy for your heart.  · Cook in healthy ways instead of frying. Healthy ways of cooking include roasting, grilling, broiling, baking, poaching, steaming, and stir-frying.  · Limit how much fluid you drink, if told by your  doctor.    Alcohol use  · Do not drink alcohol if:  ? Your doctor tells you not to drink.  ? Your heart was damaged by alcohol, or you have very bad heart failure.  ? You are pregnant, may be pregnant, or are planning to become pregnant.  · If you drink alcohol:  ? Limit how much you use to:  § 0-1 drink a day for women.  § 0-2 drinks a day for men.  ? Be aware of how much alcohol is in your drink. In the [place] one drink equals one 12 oz bottle of beer (355 mL), one 5 oz glass of wine (148 mL), or one 1½ oz glass of hard liquor (44 mL).  Lifestyle    · Do not use any products that contain nicotine or tobacco, such as cigarettes, e-cigarettes, and chewing tobacco. If you need help quitting, ask your doctor.  ? Do not use nicotine gum or patches before talking to your doctor.  · Do not use illegal drugs.  · Lose weight if told by your doctor.  · Do physical activity if told by your doctor. Talk to your doctor before you begin an exercise if:  ? You are an older adult.  ? You have very bad heart failure.  · Learn to manage stress. If you need help, ask your doctor.  · Get rehab (rehabilitation) to help you stay independent and to help with your quality of life.  · Plan time to rest when you get tired.    Check weight and blood pressure    · Weigh yourself every day. This will help you to know if fluid is building up in your body.  ? Weigh yourself every morning after you pee (urinate) and before you eat breakfast.  ? Wear the same amount of clothing each time.  ? Write down your daily weight. Give your record to your doctor.  · Check and write down your blood pressure as told by your doctor.  · Check your pulse as told by your doctor.    Dealing with very hot and very cold weather  · If it is very hot:  ? Avoid activities that take a lot of energy.  ? Use air conditioning or fans, or find a cooler place.  ? Avoid caffeine and alcohol.  ? Wear clothing that is loose-fitting, lightweight, and light-colored.  · If it is  very cold:  ? Avoid activities that take a lot of energy.  ? Layer your clothes.  ? Wear mittens or gloves, a hat, and a scarf when you go outside.  ? Avoid alcohol.  Follow these instructions at home:  · Stay up to date with shots (vaccines). Get pneumococcal and flu (influenza) shots.  · Keep all follow-up visits as told by your doctor. This is important.  Contact a doctor if:  · You gain weight quickly.  · You have increasing shortness of breath.  · You cannot do your normal activities.  · You get tired easily.  · You cough a lot.  · You don't feel like eating or feel like you may vomit (nauseous).  · You become puffy (swell) in your hands, feet, ankles, or belly (abdomen).  · You cannot sleep well because it is hard to breathe.  · You feel like your heart is beating fast (palpitations).  · You get dizzy when you stand up.  Get help right away if:  · You have trouble breathing.  · You or someone else notices a change in your behavior, such as having trouble staying awake.  · You have chest pain or discomfort.  · You pass out (faint).  These symptoms may be an emergency. Do not wait to see if the symptoms will go away. Get medical help right away. Call your local emergency services (911 in the [place] Do not drive yourself to the hospital.  Summary  · Heart failure is a serious condition. To care for yourself, you may have to change your diet, take medicines, and make other lifestyle changes.  · Take your medicines every day. Do not stop taking them unless your doctor tells you to do so.  · Eat heart-healthy foods, such as fresh or frozen fruits and vegetables, fish, lean meats, legumes, fat-free or low-fat dairy products, and whole-grain or high-fiber foods.  · Ask your doctor if you can drink alcohol. You may have to stop alcohol use if you have very bad heart failure.  · Contact your doctor if you gain weight quickly or feel that your heart is beating too fast. Get help right away if you pass out, or have chest  "pain or trouble breathing.  This information is not intended to replace advice given to you by your health care provider. Make sure you discuss any questions you have with your health care provider.  Document Revised: 03/31/2020 Document Reviewed: 04/01/2020  Elsevier Patient Education © 2021 [employer]      Low-Sodium Eating Plan  Sodium, which is an element that makes up salt, helps you maintain a healthy balance of fluids in your body. Too much sodium can increase your blood pressure and cause fluid and waste to be held in your body.  Your health care provider or dietitian may recommend following this plan if you have high blood pressure (hypertension), kidney disease, liver disease, or heart failure. Eating less sodium can help lower your blood pressure, reduce swelling, and protect your heart, liver, and kidneys.  What are tips for following this plan?  Reading food labels  · The Nutrition Facts label lists the amount of sodium in one serving of the food. If you eat more than one serving, you must multiply the listed amount of sodium by the number of servings.  · Choose foods with less than 140 mg of sodium per serving.  · Avoid foods with 300 mg of sodium or more per serving.  Shopping    · Look for lower-sodium products, often labeled as \"low-sodium\" or \"no salt added.\"  · Always check the sodium content, even if foods are labeled as \"unsalted\" or \"no salt added.\"  · Buy fresh foods.  ? Avoid canned foods and pre-made or frozen meals.  ? Avoid canned, cured, or processed meats.  · Buy breads that have less than 80 mg of sodium per slice.    Cooking    · Eat more home-cooked food and less restaurant, buffet, and fast food.  · Avoid adding salt when cooking. Use salt-free seasonings or herbs instead of table salt or sea salt. Check with your health care provider or pharmacist before using salt substitutes.  · Cook with plant-based oils, such as canola, sunflower, or olive oil.    Meal planning  · When eating " "at a restaurant, ask that your food be prepared with less salt or no salt, if possible. Avoid dishes labeled as brined, pickled, cured, smoked, or made with soy sauce, miso, or teriyaki sauce.  · Avoid foods that contain MSG (monosodium glutamate). MSG is sometimes added to Chinese food, bouillon, and some canned foods.  · Make meals that can be grilled, baked, poached, roasted, or steamed. These are generally made with less sodium.  General information  Most people on this plan should limit their sodium intake to 1,500-2,000 mg (milligrams) of sodium each day.  What foods should I eat?  Fruits  Fresh, frozen, or canned fruit. Fruit juice.  Vegetables  Fresh or frozen vegetables. \"No salt added\" canned vegetables. \"No salt added\" tomato sauce and paste. Low-sodium or reduced-sodium tomato and vegetable juice.  Grains  Low-sodium cereals, including oats, puffed wheat and rice, and shredded wheat. Low-sodium crackers. Unsalted rice. Unsalted pasta. Low-sodium bread. Whole-grain breads and whole-grain pasta.  Meats and other proteins  Fresh or frozen (no salt added) meat, poultry, seafood, and fish. Low-sodium canned tuna and salmon. Unsalted nuts. Dried peas, beans, and lentils without added salt. Unsalted canned beans. Eggs. Unsalted nut butters.  Dairy  Milk. Soy milk. Cheese that is naturally low in sodium, such as ricotta cheese, fresh mozzarella, or Swiss cheese. Low-sodium or reduced-sodium cheese. Cream cheese. Yogurt.  Seasonings and condiments  Fresh and dried herbs and spices. Salt-free seasonings. Low-sodium mustard and ketchup. Sodium-free salad dressing. Sodium-free light mayonnaise. Fresh or refrigerated horseradish. Lemon juice. Vinegar.  Other foods  Homemade, reduced-sodium, or low-sodium soups. Unsalted popcorn and pretzels. Low-salt or salt-free chips.  The items listed above may not be a complete list of foods and beverages you can eat. Contact a dietitian for more information.  What foods should I " avoid?  Vegetables  Sauerkraut, pickled vegetables, and relishes. Olives. French fries. Onion rings. Regular canned vegetables (not low-sodium or reduced-sodium). Regular canned tomato sauce and paste (not low-sodium or reduced-sodium). Regular tomato and vegetable juice (not low-sodium or reduced-sodium). Frozen vegetables in sauces.  Grains  Instant hot cereals. Bread stuffing, pancake, and biscuit mixes. Croutons. Seasoned rice or pasta mixes. Noodle soup cups. Boxed or frozen macaroni and cheese. Regular salted crackers. Self-rising flour.  Meats and other proteins  Meat or fish that is salted, canned, smoked, spiced, or pickled. Precooked or cured meat, such as sausages or meat loaves. Bhatti. Ham. Pepperoni. Hot dogs. Corned beef. Chipped beef. Salt pork. Jerky. Pickled herring. Anchovies and sardines. Regular canned tuna. Salted nuts.  Dairy  Processed cheese and cheese spreads. Hard cheeses. Cheese curds. Blue cheese. Feta cheese. String cheese. Regular cottage cheese. Buttermilk. Canned milk.  Fats and oils  Salted butter. Regular margarine. Ghee. Bhatti fat.  Seasonings and condiments  Onion salt, garlic salt, seasoned salt, table salt, and sea salt. Canned and packaged gravies. Worcestershire sauce. Tartar sauce. Barbecue sauce. Teriyaki sauce. Soy sauce, including reduced-sodium. Steak sauce. Fish sauce. Oyster sauce. Cocktail sauce. Horseradish that you find on the shelf. Regular ketchup and mustard. Meat flavorings and tenderizers. Bouillon cubes. Hot sauce. Pre-made or packaged marinades. Pre-made or packaged taco seasonings. Relishes. Regular salad dressings. Salsa.  Other foods  Salted popcorn and pretzels. Corn chips and puffs. Potato and tortilla chips. Canned or dried soups. Pizza. Frozen entrees and pot pies.  The items listed above may not be a complete list of foods and beverages you should avoid. Contact a dietitian for more information.  Summary  · Eating less sodium can help lower your blood  pressure, reduce swelling, and protect your heart, liver, and kidneys.  · Most people on this plan should limit their sodium intake to 1,500-2,000 mg (milligrams) of sodium each day.  · Canned, boxed, and frozen foods are high in sodium. Restaurant foods, fast foods, and pizza are also very high in sodium. You also get sodium by adding salt to food.  · Try to cook at home, eat more fresh fruits and vegetables, and eat less fast food and canned, processed, or prepared foods.  This information is not intended to replace advice given to you by your health care provider. Make sure you discuss any questions you have with your health care provider.  Document Revised: 01/22/2021 Document Reviewed: 11/18/2020  [employer] Patient Education © 2021 [employer]      Cholesterol Content in Foods  Cholesterol is a waxy, fat-like substance that helps to carry fat in the blood. The body needs cholesterol in small amounts, but too much cholesterol can cause damage to the arteries and heart.  Most people should eat less than 200 milligrams (mg) of cholesterol a day.  Foods with cholesterol    Cholesterol is found in animal-based foods, such as meat, seafood, and dairy. Generally, low-fat dairy and lean meats have less cholesterol than full-fat dairy and fatty meats. The milligrams of cholesterol per serving (mg per serving) of common cholesterol-containing foods are listed below.  Meat and other proteins  · Egg -- one large whole egg has 186 mg.  · Veal shank -- 4 oz has 141 mg.  · Lean ground turkey (93% lean) -- 4 oz has 118 mg.  · Fat-trimmed lamb loin -- 4 oz has 106 mg.  · Lean ground beef (90% lean) -- 4 oz has 100 mg.  · Lobster -- 3.5 oz has 90 mg.  · Pork loin chops -- 4 oz has 86 mg.  · Canned salmon -- 3.5 oz has 83 mg.  · Fat-trimmed beef top loin -- 4 oz has 78 mg.  · Frankfurter -- 1 hakeem (3.5 oz) has 77 mg.  · Crab -- 3.5 oz has 71 mg.  · Roasted chicken without skin, white meat -- 4 oz has 66 mg.  · Light bologna --  2 oz has 45 mg.  · Deli-cut turkey -- 2 oz has 31 mg.  · Canned tuna -- 3.5 oz has 31 mg.  · Bhatti -- 1 oz has 29 mg.  · Oysters and mussels (raw) -- 3.5 oz has 25 mg.  · Mackerel -- 1 oz has 22 mg.  · Trout -- 1 oz has 20 mg.  · Pork sausage -- 1 link (1 oz) has 17 mg.  · [place] -- 1 oz has 16 mg.  · Tilapia -- 1 oz has 14 mg.  Dairy  · Soft-serve ice cream -- ½ cup (4 oz) has 103 mg.  · Whole-milk yogurt -- 1 cup (8 oz) has 29 mg.  · Cheddar cheese -- 1 oz has 28 mg.  · American cheese -- 1 oz has 28 mg.  · Whole milk -- 1 cup (8 oz) has 23 mg.  · 2% milk -- 1 cup (8 oz) has 18 mg.  · Cream cheese -- 1 tablespoon (Tbsp) has 15 mg.  · Cottage cheese -- ½ cup (4 oz) has 14 mg.  · Low-fat (1%) milk -- 1 cup (8 oz) has 10 mg.  · Sour cream -- 1 Tbsp has 8.5 mg.  · Low-fat yogurt -- 1 cup (8 oz) has 8 mg.  · Nonfat Greek yogurt -- 1 cup (8 oz) has 7 mg.  · Half-and-half cream -- 1 Tbsp has 5 mg.  Fats and oils  · Cod liver oil -- 1 tablespoon (Tbsp) has 82 mg.  · Butter -- 1 Tbsp has 15 mg.  · Lard -- 1 Tbsp has 14 mg.  · Bhatti grease -- 1 Tbsp has 14 mg.  · Mayonnaise -- 1 Tbsp has 5-10 mg.  · Margarine -- 1 Tbsp has 3-10 mg.  Exact amounts of cholesterol in these foods may vary depending on specific ingredients and brands.  Foods without cholesterol  Most plant-based foods do not have cholesterol unless you combine them with a food that has cholesterol. Foods without cholesterol include:  · Grains and cereals.  · Vegetables.  · Fruits.  · Vegetable oils, such as olive, canola, and sunflower oil.  · Legumes, such as peas, beans, and lentils.  · Nuts and seeds.  · Egg whites.  Summary  · The body needs cholesterol in small amounts, but too much cholesterol can cause damage to the arteries and heart.  · Most people should eat less than 200 milligrams (mg) of cholesterol a day.  This information is not intended to replace advice given to you by your health care provider. Make sure you discuss any questions you have with  your health care provider.  Document Revised: 05/10/2021 Document Reviewed: 05/10/2021  Elsevier Patient Education © 2021 [employer]

## 2021-10-20 NOTE — ASSESSMENT & PLAN NOTE
Patient is symptomatically stable continue with her current dose of Coreg and losartan.  Volume wise no significant weight gain on Lasix 40 mg daily further adjustment as needed per nephrology

## 2021-10-21 NOTE — PROGRESS NOTES
[facility] - PODIATRY    Today's Date: 10/21/21    Patient Name: Carrington Ledbetter  MRN: [id number]  CSN: [id number]  PCP: Jeni Pope DO, last PCP visit:  13 Oct 2021  Referring Provider: No ref. provider found    SUBJECTIVE     Chief Complaint   Patient presents with   • Left Foot - Nail Problem   • Right Foot - Nail Problem     HPI: Carrington Ledbetter, a 89 y.o.female, comes to clinic.    New, Established, New Problem:  established     Location:  Toenails    Duration:   Greater than five years    Onset:  Gradual    Nature:  sore with palpation.    Stable, worsening, improving:   Stable    Aggravating factors:  Pain with shoe gear and ambulation.    Previous Treatment:  debridement  __________________________________    New, Established, New Problem:  Established    Location:  bilateral feet    Duration:    Onset:  gradual    Nature:   NIDDM     Stable, worsening, improving:  stable    __________________________________    Patient relates no medical changes since their last visit.    No other pedal complaints at this time.    Patient denies any fevers, chills, nausea, vomiting, shortness of breathe, nor any other constitutional signs nor symptoms.       Past Medical History:   Diagnosis Date   • Arthritis    • CHF (congestive heart failure) ([facility])    • Chronic HFrEF (heart failure with reduced ejection fraction) 9/14/2021   • Dialysis patient ([facility])    • Difficulty walking 01/17/2019   • DM type 2 (diabetes mellitus, type 2) ([facility])    • ESRD (end stage renal disease) on dialysis ([facility]) 9/14/2021   • Essential hypertension    • Foot pain, bilateral 03/06/2018   • Hammer toe    • HBP (high blood pressure)    • Hyperlipidemia    • Kidney failure    • Oxygen dependent    • Oxygen desaturation during sleep      Past Surgical History:   Procedure Laterality Date   • APPENDECTOMY     • CHOLECYSTECTOMY     • HYSTERECTOMY     • INSERT / REPLACE / REMOVE PACEMAKER     • PACEMAKER IMPLANTATION       Family History    Problem Relation Age of Onset   • Stroke Mother    • Heart disease Mother    • Diabetes type II Mother    • Heart disease Brother    • Diabetes type II Brother      Social History     Socioeconomic History   • Marital status:    Tobacco Use   • Smoking status: Former Smoker     Packs/day: 2.00     Types: Cigarettes   • Smokeless tobacco: Never Used   • Tobacco comment: quit at age 60   Vaping Use   • Vaping Use: Never used   Substance and Sexual Activity   • Alcohol use: Never   • Drug use: Never   • Sexual activity: Defer     No Known Allergies  Current Outpatient Medications   Medication Sig Dispense Refill   • albuterol sulfate  (90 Base) MCG/ACT inhaler Inhale 2 puffs Every 6 (Six) Hours As Needed.     • aspirin (aspirin) 81 MG EC tablet aspirin 81 mg oral tablet,delayed release (DR/EC) take 1 tablet (81 mg) by oral route once daily   Active     • carvedilol (COREG) 6.25 MG tablet Take 6.25 mg by mouth. Take 2 (two) pills once a day     • fluconazole (DIFLUCAN) 150 MG tablet      • Fluticasone-Umeclidin-Vilant (Trelegy Ellipta) 200-62.5-25 MCG/INH inhaler Inhale 1 puff Daily for 90 days. Rinse mouth out after each use. 3 each 3   • furosemide (LASIX) 40 MG tablet furosemide 40 mg oral tablet take 1 tablet by oral route 2 times a day for 90 days 2/8/2021  Active     • gentamicin (GARAMYCIN) 0.1 % ointment Apply 1 application topically to the appropriate area as directed 2 (two) times a day.     • insulin lispro (humaLOG) 100 UNIT/ML injection Inject  under the skin into the appropriate area as directed.     • ketoconazole (NIZORAL) 2 % cream      • Klor-Con 8 MEQ CR tablet Take 1 tablet by mouth Daily. 30 tablet 0   • losartan (COZAAR) 25 MG tablet losartan 25 mg oral tablet take 1 tablet (25 mg) by oral route once daily 2/11/2021  Active     • metOLazone (ZAROXOLYN) 5 MG tablet Take 5 mg by mouth.     • sodium chloride 3 % nebulizer solution Take 4 mL by nebulization 2 (Two) Times a Day for 90  days. 720 mL 3     No current facility-administered medications for this visit.     Review of Systems   Constitutional: Negative.    Skin:        Painful toenails bilaterally   All other systems reviewed and are negative.      OBJECTIVE     Vitals:    10/21/21 1102   BP: 91/57   Pulse: 112   Temp: 97.6 °F (36.4 °C)   SpO2: 94%       Lab Results   Component Value Date    HGBA1C 6.1 (H) 2021       Lab Results   Component Value Date    GLUCOSE 113 (H) 2021    CALCIUM 8.9 2021     2021    K 5.1 2021    CO2 25.0 2021     2021    BUN 88 (H) 2021    CREATININE 1.83 (H) 2021    EGFRIFAFRI 31 (L) 2021    BCR 48.1 (H) 2021    ANIONGAP 14.0 2021       Patient seen in no apparent distress.      PHYSICAL EXAM:     Foot/Ankle Exam:       General:   Diabetic Foot Exam Performed    Appearance: elderly    Appearance comment:  Utilizing nasal oxygen  Orientation: AAOx3    Affect: appropriate    Assistance: cane    Shoe Gear:  Casual shoes    VASCULAR      Right Foot Vascularity   Normal vascular exam    Dorsalis pedis:  2+  Posterior tibial:  2+  Skin Temperature: warm    Edema Gradin+  CFT:  < 3 seconds  Pedal Hair Growth:  Present  Varicosities: none       Left Foot Vascularity   Normal vascular exam    Dorsalis pedis:  2+  Posterior tibial:  2+  Skin Temperature: warm    Edema Gradin+  CFT:  < 3 seconds  Pedal Hair Growth:  Present  Varicosities: none        NEUROLOGIC     Right Foot Neurologic   Normal sensation    Light touch sensation:  Normal  Vibratory sensation:  Normal  Hot/Cold sensation: normal       Left Foot Neurologic   Normal sensation    Light touch sensation:  Normal  Vibratory sensation:  Normal  Hot/cold sensation: normal       MUSCLE STRENGTH     Right Foot Muscle Strength   Foot dorsiflexion:  4+  Foot plantar flexion:  4+  Foot inversion:  4+  Foot eversion:  4+     Left Foot Muscle Strength   Foot dorsiflexion:   4+  Foot plantar flexion:  4+  Foot inversion:  4+  Foot eversion:  4+     DERMATOLOGIC     Right Foot Dermatologic   Skin: skin intact    Nails: onychomycosis, abnormally thick, subungual debris, dystrophic nails and ingrown toenail    Nails comment:  Toenails 1, 2, 3, and 5     Left Foot Dermatologic   Skin: skin intact    Nails: onychomycosis, abnormally thick, subungual debris, dystrophic nails and ingrown toenail    Nails comment:  Toenails 1, 2, 3, and 5      ASSESSMENT/PLAN     Diagnoses and all orders for this visit:    1. Foot pain, bilateral (Primary)    2. Onychomycosis    3. Onychocryptosis    4. Difficulty walking    5. Non-insulin dependent type 2 diabetes mellitus (HCC)    6. Diabetic foot (HCC)        Comprehensive lower extremity examination and evaluation was performed.    Discussed findings and treatment plan including risks, benefits, and treatment options with patient in detail. Patient agreed with treatment plan.    Toenails 1, 2, 3, 5 bilaterally were debrided in thickness and length and then smoothed with a Dremel Tool.  Tolerated the procedure well without complications.    Diabetic foot exam performed and documented this date, compliant with CQM required standards. Detail of findings as noted in physical exam.  Lower extremity Neurologic exam for diabetic patient performed and documented this date, compliant with PQRS required standards. Detail of findings as noted in physical exam.  Advised patient importance of good routine lower extremity hygiene. Advised patient importance of evaluating for intact skin and pain free nail borders.  Advised patient to use mirror to evaluate plantar/ soles of feet for better visualization. Advised patient monitor and phone office to be seen if any cracking to skin, open lesions, painful nail borders or if nails become elongated prior to next visit. Advised patient importance of daily cleansing of lower extremities, followed by good skin cream to maintain  normal hydration of skin. Also advised patient importance of close daily monitoring of blood sugar. Advised to regulate diet and medications to maintain control of blood sugar in optimal range. Contact primary care provider if difficulties maintaining blood sugar levels.  Advised Patient of presence of Diabetes Mellitus condition.  Advised Patient risk of progression and worsening or improvement, then return of condition.  Will monitor condition for any change in future. Treat with most appropriate treatment pending status of condition.  Counseled and advised patient extensively on nature and ramifications of diabetes. Standard instructions given to patient for good diabetic foot care and maintenance. Advised importance of careful monitoring to avoid break down and complications secondary to diabetes. Advised patient importance of strict maintenance of blood sugar control. Advised patient of possible ominous results from neglect of condition, i.e.: amputation/ loss of digits, feet and legs, or even death.  Patient states understands counseling, will monitor closely, continue good hygiene and routine diabetic foot care. Patient will contact office is questions or problems.      An After Visit Summary was printed and given to the patient at discharge, including (if requested) any available informative/educational handouts regarding diagnosis, treatment, or medications. All questions were answered to patient/family satisfaction. Should symptoms fail to improve or worsen they agree to call or return to clinic or to go to the Emergency Department. Discussed the importance of following up with any needed screening tests/labs/specialist appointments and any requested follow-up recommended by me today. Importance of maintaining follow-up discussed and patient accepts that missed appointments can delay diagnosis and potentially lead to worsening of conditions.    Return in about 9 weeks (around 12/23/2021) for Toenail Care.,  or sooner if acute issues arise.    This document has been electronically signed by Cosmo Garcia DPM on October 21, 2021 11:15 EDT

## 2021-10-21 NOTE — PROGRESS NOTES
Normal Dual Chamber Pacemaker device interrogation.  Normal evaluation of device function and lead measurements.  No optimization was needed of parameters or maximization of device longevity.  Remaining battery is 8 1/2 years.

## 2021-10-28 NOTE — TELEPHONE ENCOUNTER
Caller: Carrington Ledbetter    Relationship: Self      Medication requested (name and dosage): SEE BELOW    Requested Prescriptions:   Requested Prescriptions     Pending Prescriptions Disp Refills   • Klor-Con 8 MEQ CR tablet 30 tablet 0     Sig: Take 1 tablet by mouth Daily.        Pharmacy where request should be sent: [facility] - [place], [place] - [address] AT [facility] to [facility] - [phone]  - [phone] [phone]    Additional details provided by patient: PHARMACY IS NEEDING PRIOR AUTHORIZATION. PATIENT IS COMPLETELY OUT. PLEASE CALL AND ADVISE    Best call back number: [phone]    Does the patient have less than a 3 day supply:  [x] Yes  [] No    Eliecer Olivarez Rep   10/28/21 10:03 EDT

## 2021-11-03 NOTE — TELEPHONE ENCOUNTER
Retuned called to [facility] they wanted to verify sig for coreg 6.25 bid or 2 tabs bid.   Dr. Solis note does not stated any changes for the coreg so pt should continue taking it 1 tab bid.

## 2021-11-19 NOTE — PROGRESS NOTES
[facility] - PODIATRY    Today's Date: 11/19/21    Patient Name: Carrington Ledbetter  MRN: [id number]  CSN: [id number]  PCP: Jeni Pope DO, last PCP visit: 18 November 2021  Referring Provider: No ref. provider found    SUBJECTIVE     Chief Complaint   Patient presents with   • Left Foot - Nail Problem   • Right Foot - Nail Problem     HPI: Carrington Ledbetter, a 89 y.o.female, comes to clinic.    New, Established, New Problem:  established     Location:  Toenails    Duration:   Greater than five years    Onset:  Gradual    Nature:  sore with palpation.    Stable, worsening, improving:   Stable    Aggravating factors:  Pain with shoe gear and ambulation.    Previous Treatment:  debridement  __________________________________    New, Established, New Problem:  Established    Location:  bilateral feet    Duration:    Onset:  gradual    Nature:   NIDDM     Stable, worsening, improving:  stable    __________________________________    Medical changes: Skin biopsy for lesion on nose    No other pedal complaints at this time.    Patient denies any fevers, chills, nausea, vomiting, shortness of breathe, nor any other constitutional signs nor symptoms.       Past Medical History:   Diagnosis Date   • Arthritis    • CHF (congestive heart failure) ([facility])    • Chronic HFrEF (heart failure with reduced ejection fraction) 9/14/2021   • Dialysis patient ([facility])    • Difficulty walking 01/17/2019   • DM type 2 (diabetes mellitus, type 2) ([facility])    • ESRD (end stage renal disease) on dialysis ([facility]) 9/14/2021   • Essential hypertension    • Foot pain, bilateral 03/06/2018   • Hammer toe    • HBP (high blood pressure)    • Hyperlipidemia    • Kidney failure    • Oxygen dependent    • Oxygen desaturation during sleep      Past Surgical History:   Procedure Laterality Date   • APPENDECTOMY     • CHOLECYSTECTOMY     • HYSTERECTOMY     • INSERT / REPLACE / REMOVE PACEMAKER     • PACEMAKER IMPLANTATION       Family History    Problem Relation Age of Onset   • Stroke Mother    • Heart disease Mother    • Diabetes type II Mother    • Heart disease Brother    • Diabetes type II Brother      Social History     Socioeconomic History   • Marital status:    Tobacco Use   • Smoking status: Former Smoker     Packs/day: 2.00     Types: Cigarettes   • Smokeless tobacco: Never Used   • Tobacco comment: quit at age 60   Vaping Use   • Vaping Use: Never used   Substance and Sexual Activity   • Alcohol use: Never   • Drug use: Never   • Sexual activity: Defer     No Known Allergies  Current Outpatient Medications   Medication Sig Dispense Refill   • albuterol sulfate  (90 Base) MCG/ACT inhaler Inhale 2 puffs Every 6 (Six) Hours As Needed.     • aspirin (aspirin) 81 MG EC tablet aspirin 81 mg oral tablet,delayed release (DR/EC) take 1 tablet (81 mg) by oral route once daily   Active     • carvedilol (COREG) 6.25 MG tablet Take 1 tablet by mouth 2 (Two) Times a Day With Meals. Take 2 (two) pills once a day 180 tablet 3   • fluconazole (DIFLUCAN) 150 MG tablet      • Fluticasone-Umeclidin-Vilant (Trelegy Ellipta) 200-62.5-25 MCG/INH inhaler Inhale 1 puff Daily for 90 days. Rinse mouth out after each use. 3 each 3   • furosemide (LASIX) 40 MG tablet furosemide 40 mg oral tablet take 1 tablet by oral route 2 times a day for 90 days 2/8/2021  Active     • gentamicin (GARAMYCIN) 0.1 % ointment Apply 1 application topically to the appropriate area as directed 2 (two) times a day.     • insulin lispro (humaLOG) 100 UNIT/ML injection Inject  under the skin into the appropriate area as directed.     • ketoconazole (NIZORAL) 2 % cream      • Klor-Con 8 MEQ CR tablet Take 1 tablet by mouth Daily. 30 tablet 0   • losartan (COZAAR) 25 MG tablet Take 1 tablet by mouth Daily. 90 tablet 3   • metOLazone (ZAROXOLYN) 5 MG tablet Take 5 mg by mouth.     • sodium chloride 3 % nebulizer solution Take 4 mL by nebulization 2 (Two) Times a Day for 90 days. 720  mL 3     No current facility-administered medications for this visit.     Review of Systems   Constitutional: Negative.    Skin:        Painful toenails bilaterally   All other systems reviewed and are negative.      OBJECTIVE     Vitals:    21 1044   BP: 115/56   Pulse: 86   Temp: 97.1 °F (36.2 °C)   SpO2: 97%       Lab Results   Component Value Date    HGBA1C 6.1 (H) 2021       Lab Results   Component Value Date    GLUCOSE 113 (H) 2021    CALCIUM 8.9 2021     2021    K 5.1 2021    CO2 25.0 2021     2021    BUN 88 (H) 2021    CREATININE 1.83 (H) 2021    EGFRIFAFRI 31 (L) 2021    BCR 48.1 (H) 2021    ANIONGAP 14.0 2021       Patient seen in no apparent distress.      PHYSICAL EXAM:     Foot/Ankle Exam:       General:   Diabetic Foot Exam Performed    Appearance: elderly    Appearance comment:  Utilizing nasal oxygen  Orientation: AAOx3    Affect: appropriate    Assistance: cane    Shoe Gear:  Casual shoes    VASCULAR      Right Foot Vascularity   Normal vascular exam    Dorsalis pedis:  2+  Posterior tibial:  2+  Skin Temperature: warm    Edema Gradin+  CFT:  < 3 seconds  Pedal Hair Growth:  Present  Varicosities: none       Left Foot Vascularity   Normal vascular exam    Dorsalis pedis:  2+  Posterior tibial:  2+  Skin Temperature: warm    Edema Gradin+  CFT:  < 3 seconds  Pedal Hair Growth:  Present  Varicosities: none        NEUROLOGIC     Right Foot Neurologic   Normal sensation    Light touch sensation:  Normal  Vibratory sensation:  Normal  Hot/Cold sensation: normal       Left Foot Neurologic   Normal sensation    Light touch sensation:  Normal  Vibratory sensation:  Normal  Hot/cold sensation: normal       MUSCLE STRENGTH     Right Foot Muscle Strength   Foot dorsiflexion:  4+  Foot plantar flexion:  4+  Foot inversion:  4+  Foot eversion:  4+     Left Foot Muscle Strength   Foot dorsiflexion:  4+  Foot  plantar flexion:  4+  Foot inversion:  4+  Foot eversion:  4+     DERMATOLOGIC     Right Foot Dermatologic   Skin: skin intact    Nails: onychomycosis, abnormally thick, subungual debris, dystrophic nails and ingrown toenail    Nails comment:  Toenails 1, 2, 3, and 5     Left Foot Dermatologic   Skin: skin intact    Nails: onychomycosis, abnormally thick, subungual debris, dystrophic nails and ingrown toenail    Nails comment:  Toenails 1, 2, 3, and 5      ASSESSMENT/PLAN     Diagnoses and all orders for this visit:    1. Foot pain, bilateral (Primary)    2. Onychomycosis    3. Onychocryptosis    4. Diabetic foot (HCC)    5. Non-insulin dependent type 2 diabetes mellitus (HCC)    6. Difficulty walking        Comprehensive lower extremity examination and evaluation was performed.    Discussed findings and treatment plan including risks, benefits, and treatment options with patient in detail. Patient agreed with treatment plan.    Toenails 1, 2, 3, 5 bilaterally were debrided in thickness and length and then smoothed with a Dremel Tool.  Tolerated the procedure well without complications.    Diabetic foot exam performed and documented this date, compliant with CQM required standards. Detail of findings as noted in physical exam.  Lower extremity Neurologic exam for diabetic patient performed and documented this date, compliant with PQRS required standards. Detail of findings as noted in physical exam.  Advised patient importance of good routine lower extremity hygiene. Advised patient importance of evaluating for intact skin and pain free nail borders.  Advised patient to use mirror to evaluate plantar/ soles of feet for better visualization. Advised patient monitor and phone office to be seen if any cracking to skin, open lesions, painful nail borders or if nails become elongated prior to next visit. Advised patient importance of daily cleansing of lower extremities, followed by good skin cream to maintain normal  hydration of skin. Also advised patient importance of close daily monitoring of blood sugar. Advised to regulate diet and medications to maintain control of blood sugar in optimal range. Contact primary care provider if difficulties maintaining blood sugar levels.  Advised Patient of presence of Diabetes Mellitus condition.  Advised Patient risk of progression and worsening or improvement, then return of condition.  Will monitor condition for any change in future. Treat with most appropriate treatment pending status of condition.  Counseled and advised patient extensively on nature and ramifications of diabetes. Standard instructions given to patient for good diabetic foot care and maintenance. Advised importance of careful monitoring to avoid break down and complications secondary to diabetes. Advised patient importance of strict maintenance of blood sugar control. Advised patient of possible ominous results from neglect of condition, i.e.: amputation/ loss of digits, feet and legs, or even death.  Patient states understands counseling, will monitor closely, continue good hygiene and routine diabetic foot care. Patient will contact office is questions or problems.      An After Visit Summary was printed and given to the patient at discharge, including (if requested) any available informative/educational handouts regarding diagnosis, treatment, or medications. All questions were answered to patient/family satisfaction. Should symptoms fail to improve or worsen they agree to call or return to clinic or to go to the Emergency Department. Discussed the importance of following up with any needed screening tests/labs/specialist appointments and any requested follow-up recommended by me today. Importance of maintaining follow-up discussed and patient accepts that missed appointments can delay diagnosis and potentially lead to worsening of conditions.    Return in about 9 weeks (around 1/21/2022) for Toenail Care., or  sooner if acute issues arise.    This document has been electronically signed by Cosmo Garcia DPM on November 19, 2021 10:55 EST

## 2021-12-23 NOTE — PROGRESS NOTES
[facility]    Today's Date: 12/23/21    Patient Name: Carrington Ledbetter  MRN: [id number]  CSN: [id number]  PCP: Jeni Pope DO, last PCP visit: 18 November 2021  Referring Provider: No ref. provider found    SUBJECTIVE     Chief Complaint   Patient presents with   • Left Foot - Nail Problem   • Right Foot - Nail Problem     HPI: Carrington Ledbetter, a 89 y.o.female, comes to clinic.    New, Established, New Problem:  established     Location:  Toenails    Duration:   Greater than five years    Onset:  Gradual    Nature:  sore with palpation.    Stable, worsening, improving:   Stable    Aggravating factors:  Pain with shoe gear and ambulation.    Previous Treatment:  debridement  __________________________________    New, Established, New Problem:  Established    Location:  bilateral feet    Duration:    Onset:  gradual    Nature:   NIDDM     Stable, worsening, improving:  stable    __________________________________    Medical changes: Flu vaccination, Covid booster    No other pedal complaints at this time.    Patient denies any fevers, chills, nausea, vomiting, shortness of breathe, nor any other constitutional signs nor symptoms.       Past Medical History:   Diagnosis Date   • Arthritis    • CHF (congestive heart failure) ([facility])    • Chronic HFrEF (heart failure with reduced ejection fraction) 9/14/2021   • Dialysis patient ([facility])    • Difficulty walking 01/17/2019   • DM type 2 (diabetes mellitus, type 2) ([facility])    • ESRD (end stage renal disease) on dialysis ([facility]) 9/14/2021   • Essential hypertension    • Foot pain, bilateral 03/06/2018   • Hammer toe    • HBP (high blood pressure)    • Hyperlipidemia    • Kidney failure    • Oxygen dependent    • Oxygen desaturation during sleep      Past Surgical History:   Procedure Laterality Date   • APPENDECTOMY     • CHOLECYSTECTOMY     • HYSTERECTOMY     • INSERT / REPLACE / REMOVE PACEMAKER     • PACEMAKER IMPLANTATION       Family History    Problem Relation Age of Onset   • Stroke Mother    • Heart disease Mother    • Diabetes type II Mother    • Heart disease Brother    • Diabetes type II Brother      Social History     Socioeconomic History   • Marital status:    Tobacco Use   • Smoking status: Former Smoker     Packs/day: 2.00     Types: Cigarettes   • Smokeless tobacco: Never Used   • Tobacco comment: quit at age 60   Vaping Use   • Vaping Use: Never used   Substance and Sexual Activity   • Alcohol use: Never   • Drug use: Never   • Sexual activity: Defer     No Known Allergies  Current Outpatient Medications   Medication Sig Dispense Refill   • albuterol sulfate  (90 Base) MCG/ACT inhaler Inhale 2 puffs Every 6 (Six) Hours As Needed.     • aspirin (aspirin) 81 MG EC tablet aspirin 81 mg oral tablet,delayed release (DR/EC) take 1 tablet (81 mg) by oral route once daily   Active     • carvedilol (COREG) 6.25 MG tablet Take 1 tablet by mouth 2 (Two) Times a Day With Meals. Take 2 (two) pills once a day 180 tablet 3   • ergocalciferol (ERGOCALCIFEROL) 1.25 MG (80499 UT) capsule Take 50,000 Units by mouth.     • fluconazole (DIFLUCAN) 150 MG tablet TAKE 1 TABLET ONE TIME FOR 1 DOSE 14 tablet 0   • furosemide (LASIX) 40 MG tablet furosemide 40 mg oral tablet take 1 tablet by oral route 2 times a day for 90 days 2/8/2021  Active     • gentamicin (GARAMYCIN) 0.1 % ointment Apply 1 application topically to the appropriate area as directed 2 (two) times a day.     • insulin lispro (humaLOG) 100 UNIT/ML injection Inject  under the skin into the appropriate area as directed.     • ketoconazole (NIZORAL) 2 % cream As Needed.     • Klor-Con 8 MEQ CR tablet TAKE 1 TABLET DAILY 30 tablet 0   • levalbuterol (XOPENEX) 1.25 MG/3ML nebulizer solution INHALE THE CONTENTS OF 1   VIAL VIA NEBULIZER EVERY 4 HOURS AS NEEDED FOR        SHORTNESS OF BREATH 900 mL 0   • losartan (COZAAR) 25 MG tablet Take 1 tablet by mouth Daily. 90 tablet 3   • metOLazone  (ZAROXOLYN) 5 MG tablet Take 5 mg by mouth.     • metroNIDAZOLE (METROCREAM) 0.75 % cream As Needed.     • Fluticasone-Umeclidin-Vilant (Trelegy Ellipta) 200-62.5-25 MCG/INH inhaler Inhale 1 puff Daily for 90 days. Rinse mouth out after each use. 3 each 3     No current facility-administered medications for this visit.     Review of Systems   Constitutional: Negative.    Skin:        Painful toenails bilaterally   All other systems reviewed and are negative.      OBJECTIVE     Vitals:    21 1028   BP: 141/59   Pulse: 91   Temp: 96.9 °F (36.1 °C)   SpO2: 94%       Lab Results   Component Value Date    HGBA1C 6.1 (H) 2021       Lab Results   Component Value Date    GLUCOSE 96 2021    CALCIUM 9.5 2021     (H) 2021    K 5.4 (H) 2021    CO2 27.4 2021     (H) 2021    BUN 42 (H) 2021    CREATININE 1.32 (H) 2021    EGFRIFAFRI 45 (L) 2021    BCR 31.8 (H) 2021    ANIONGAP 7.6 2021       Patient seen in no apparent distress.      PHYSICAL EXAM:     Foot/Ankle Exam:       General:   Diabetic Foot Exam Performed    Appearance: elderly    Appearance comment:  Utilizing nasal oxygen  Orientation: AAOx3    Affect: appropriate    Assistance: walker    Shoe Gear:  Casual shoes    VASCULAR      Right Foot Vascularity   Normal vascular exam    Dorsalis pedis:  2+  Posterior tibial:  2+  Skin Temperature: warm    Edema Gradin+  CFT:  < 3 seconds  Pedal Hair Growth:  Present  Varicosities: none       Left Foot Vascularity   Normal vascular exam    Dorsalis pedis:  2+  Posterior tibial:  2+  Skin Temperature: warm    Edema Gradin+  CFT:  < 3 seconds  Pedal Hair Growth:  Present  Varicosities: none        NEUROLOGIC     Right Foot Neurologic   Normal sensation    Light touch sensation:  Normal  Vibratory sensation:  Normal  Hot/Cold sensation: normal       Left Foot Neurologic   Normal sensation    Light touch sensation:  Normal  Vibratory  sensation:  Normal  Hot/cold sensation: normal       MUSCLE STRENGTH     Right Foot Muscle Strength   Foot dorsiflexion:  4+  Foot plantar flexion:  4+  Foot inversion:  4+  Foot eversion:  4+     Left Foot Muscle Strength   Foot dorsiflexion:  4+  Foot plantar flexion:  4+  Foot inversion:  4+  Foot eversion:  4+     DERMATOLOGIC     Right Foot Dermatologic   Skin: skin intact    Nails: onychomycosis, abnormally thick, subungual debris, dystrophic nails and ingrown toenail    Nails comment:  Toenails 1, 2, 3, and 5     Left Foot Dermatologic   Skin: skin intact    Nails: onychomycosis, abnormally thick, subungual debris, dystrophic nails and ingrown toenail    Nails comment:  Toenails 1, 2, 3, and 5      ASSESSMENT/PLAN     Diagnoses and all orders for this visit:    1. Foot pain, bilateral (Primary)    2. Onychomycosis    3. Onychocryptosis    4. Diabetic foot (HCC)    5. Non-insulin dependent type 2 diabetes mellitus (HCC)    6. Difficulty walking        Comprehensive lower extremity examination and evaluation was performed.    Discussed findings and treatment plan including risks, benefits, and treatment options with patient in detail. Patient agreed with treatment plan.    Toenails 1, 2, 3, 5 bilaterally were debrided in thickness and length and then smoothed with a Dremel Tool.  Tolerated the procedure well without complications.    Diabetic foot exam performed and documented this date, compliant with CQM required standards. Detail of findings as noted in physical exam.  Lower extremity Neurologic exam for diabetic patient performed and documented this date, compliant with PQRS required standards. Detail of findings as noted in physical exam.  Advised patient importance of good routine lower extremity hygiene. Advised patient importance of evaluating for intact skin and pain free nail borders.  Advised patient to use mirror to evaluate plantar/ soles of feet for better visualization. Advised patient monitor  and phone office to be seen if any cracking to skin, open lesions, painful nail borders or if nails become elongated prior to next visit. Advised patient importance of daily cleansing of lower extremities, followed by good skin cream to maintain normal hydration of skin. Also advised patient importance of close daily monitoring of blood sugar. Advised to regulate diet and medications to maintain control of blood sugar in optimal range. Contact primary care provider if difficulties maintaining blood sugar levels.  Advised Patient of presence of Diabetes Mellitus condition.  Advised Patient risk of progression and worsening or improvement, then return of condition.  Will monitor condition for any change in future. Treat with most appropriate treatment pending status of condition.  Counseled and advised patient extensively on nature and ramifications of diabetes. Standard instructions given to patient for good diabetic foot care and maintenance. Advised importance of careful monitoring to avoid break down and complications secondary to diabetes. Advised patient importance of strict maintenance of blood sugar control. Advised patient of possible ominous results from neglect of condition, i.e.: amputation/ loss of digits, feet and legs, or even death.  Patient states understands counseling, will monitor closely, continue good hygiene and routine diabetic foot care. Patient will contact office is questions or problems.      An After Visit Summary was printed and given to the patient at discharge, including (if requested) any available informative/educational handouts regarding diagnosis, treatment, or medications. All questions were answered to patient/family satisfaction. Should symptoms fail to improve or worsen they agree to call or return to clinic or to go to the Emergency Department. Discussed the importance of following up with any needed screening tests/labs/specialist appointments and any requested follow-up  recommended by me today. Importance of maintaining follow-up discussed and patient accepts that missed appointments can delay diagnosis and potentially lead to worsening of conditions.    Return in about 5 weeks (around 1/27/2022) for Toenail Care., or sooner if acute issues arise.    This document has been electronically signed by Cosmo Garcia DPM on December 23, 2021 11:06 EST

## 2021-12-30 PROBLEM — I50.23 ACUTE ON CHRONIC HFREF (HEART FAILURE WITH REDUCED EJECTION FRACTION) (HCC): Status: ACTIVE | Noted: 2021-01-01

## 2021-12-31 PROBLEM — I50.23 ACUTE ON CHRONIC SYSTOLIC CONGESTIVE HEART FAILURE (HCC): Status: ACTIVE | Noted: 2021-01-01

## 2022-01-01 ENCOUNTER — CLINICAL SUPPORT NO REQUIREMENTS (OUTPATIENT)
Dept: CARDIOLOGY | Facility: CLINIC | Age: 87
End: 2022-01-01

## 2022-01-01 ENCOUNTER — TELEPHONE (OUTPATIENT)
Dept: FAMILY MEDICINE CLINIC | Facility: CLINIC | Age: 87
End: 2022-01-01

## 2022-01-01 ENCOUNTER — APPOINTMENT (OUTPATIENT)
Dept: GENERAL RADIOLOGY | Facility: HOSPITAL | Age: 87
End: 2022-01-01

## 2022-01-01 ENCOUNTER — TELEPHONE (OUTPATIENT)
Dept: CARDIOLOGY | Facility: CLINIC | Age: 87
End: 2022-01-01

## 2022-01-01 ENCOUNTER — ANESTHESIA (OUTPATIENT)
Dept: PERIOP | Facility: HOSPITAL | Age: 87
End: 2022-01-01

## 2022-01-01 ENCOUNTER — READMISSION MANAGEMENT (OUTPATIENT)
Dept: CALL CENTER | Facility: HOSPITAL | Age: 87
End: 2022-01-01

## 2022-01-01 ENCOUNTER — APPOINTMENT (OUTPATIENT)
Dept: CT IMAGING | Facility: HOSPITAL | Age: 87
End: 2022-01-01

## 2022-01-01 ENCOUNTER — OFFICE VISIT (OUTPATIENT)
Dept: FAMILY MEDICINE CLINIC | Facility: CLINIC | Age: 87
End: 2022-01-01

## 2022-01-01 ENCOUNTER — HOSPITAL ENCOUNTER (INPATIENT)
Facility: HOSPITAL | Age: 87
LOS: 2 days | End: 2022-02-26
Attending: EMERGENCY MEDICINE | Admitting: STUDENT IN AN ORGANIZED HEALTH CARE EDUCATION/TRAINING PROGRAM

## 2022-01-01 ENCOUNTER — TRANSITIONAL CARE MANAGEMENT TELEPHONE ENCOUNTER (OUTPATIENT)
Dept: CALL CENTER | Facility: HOSPITAL | Age: 87
End: 2022-01-01

## 2022-01-01 ENCOUNTER — ANESTHESIA EVENT (OUTPATIENT)
Dept: PERIOP | Facility: HOSPITAL | Age: 87
End: 2022-01-01

## 2022-01-01 ENCOUNTER — OFFICE VISIT (OUTPATIENT)
Dept: PODIATRY | Facility: CLINIC | Age: 87
End: 2022-01-01

## 2022-01-01 ENCOUNTER — OFFICE VISIT (OUTPATIENT)
Dept: CARDIOLOGY | Facility: CLINIC | Age: 87
End: 2022-01-01

## 2022-01-01 VITALS
BODY MASS INDEX: 27.01 KG/M2 | DIASTOLIC BLOOD PRESSURE: 68 MMHG | SYSTOLIC BLOOD PRESSURE: 100 MMHG | WEIGHT: 134 LBS | OXYGEN SATURATION: 95 % | HEIGHT: 59 IN | TEMPERATURE: 97 F | HEART RATE: 96 BPM

## 2022-01-01 VITALS
WEIGHT: 127 LBS | SYSTOLIC BLOOD PRESSURE: 123 MMHG | HEIGHT: 59 IN | DIASTOLIC BLOOD PRESSURE: 49 MMHG | BODY MASS INDEX: 25.6 KG/M2 | HEART RATE: 71 BPM

## 2022-01-01 VITALS
OXYGEN SATURATION: 66 % | BODY MASS INDEX: 25.29 KG/M2 | SYSTOLIC BLOOD PRESSURE: 83 MMHG | RESPIRATION RATE: 24 BRPM | HEIGHT: 59 IN | WEIGHT: 125.44 LBS | DIASTOLIC BLOOD PRESSURE: 63 MMHG | TEMPERATURE: 99.5 F | HEART RATE: 70 BPM

## 2022-01-01 VITALS
WEIGHT: 133.38 LBS | RESPIRATION RATE: 16 BRPM | HEIGHT: 59 IN | SYSTOLIC BLOOD PRESSURE: 133 MMHG | BODY MASS INDEX: 26.89 KG/M2 | OXYGEN SATURATION: 95 % | TEMPERATURE: 97.9 F | HEART RATE: 88 BPM | DIASTOLIC BLOOD PRESSURE: 71 MMHG

## 2022-01-01 VITALS
TEMPERATURE: 97.1 F | HEART RATE: 79 BPM | BODY MASS INDEX: 27.06 KG/M2 | SYSTOLIC BLOOD PRESSURE: 106 MMHG | DIASTOLIC BLOOD PRESSURE: 64 MMHG | OXYGEN SATURATION: 93 % | WEIGHT: 134 LBS

## 2022-01-01 DIAGNOSIS — I46.9 CARDIAC ARREST: Primary | ICD-10-CM

## 2022-01-01 DIAGNOSIS — I10 ESSENTIAL HYPERTENSION: ICD-10-CM

## 2022-01-01 DIAGNOSIS — S72.001A CLOSED FRACTURE OF RIGHT HIP, INITIAL ENCOUNTER: ICD-10-CM

## 2022-01-01 DIAGNOSIS — I49.5 SSS (SICK SINUS SYNDROME): ICD-10-CM

## 2022-01-01 DIAGNOSIS — R26.2 DIFFICULTY WALKING: ICD-10-CM

## 2022-01-01 DIAGNOSIS — L60.0 ONYCHOCRYPTOSIS: ICD-10-CM

## 2022-01-01 DIAGNOSIS — M79.671 FOOT PAIN, BILATERAL: Primary | ICD-10-CM
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
## 2022-01-01 DIAGNOSIS — B35.1 ONYCHOMYCOSIS: ICD-10-CM

## 2022-01-01 DIAGNOSIS — M79.672 FOOT PAIN, BILATERAL: Primary | ICD-10-CM

## 2022-01-01 DIAGNOSIS — N18.4 CRF (CHRONIC RENAL FAILURE), STAGE 4 (SEVERE): ICD-10-CM

## 2022-01-01 DIAGNOSIS — Z95.0 PRESENCE OF CARDIAC PACEMAKER: ICD-10-CM

## 2022-01-01 DIAGNOSIS — J44.9 CHRONIC OBSTRUCTIVE PULMONARY DISEASE, UNSPECIFIED COPD TYPE: ICD-10-CM

## 2022-01-01 DIAGNOSIS — N18.9 CHRONIC RENAL FAILURE, UNSPECIFIED CKD STAGE: ICD-10-CM

## 2022-01-01 DIAGNOSIS — S72.001A CLOSED FRACTURE OF NECK OF RIGHT FEMUR, INITIAL ENCOUNTER: ICD-10-CM

## 2022-01-01 DIAGNOSIS — Z09 HOSPITAL DISCHARGE FOLLOW-UP: Primary | ICD-10-CM

## 2022-01-01 DIAGNOSIS — E11.8 DIABETIC FOOT: ICD-10-CM

## 2022-01-01 DIAGNOSIS — Z95.0 PRESENCE OF CARDIAC PACEMAKER: Primary | ICD-10-CM

## 2022-01-01 DIAGNOSIS — E11.9 NON-INSULIN DEPENDENT TYPE 2 DIABETES MELLITUS: ICD-10-CM

## 2022-01-01 DIAGNOSIS — J96.11 CHRONIC RESPIRATORY FAILURE WITH HYPOXIA: ICD-10-CM

## 2022-01-01 DIAGNOSIS — J18.9 COMMUNITY ACQUIRED PNEUMONIA, UNSPECIFIED LATERALITY: ICD-10-CM

## 2022-01-01 DIAGNOSIS — I50.22 CHRONIC HFREF (HEART FAILURE WITH REDUCED EJECTION FRACTION): Primary | ICD-10-CM

## 2022-01-01 LAB
ABO GROUP BLD: NORMAL
ABO GROUP BLD: NORMAL
ALBUMIN SERPL-MCNC: 2.3 G/DL (ref 3.5–5.2)
ALBUMIN SERPL-MCNC: 3.7 G/DL (ref 3.5–5.2)
ALBUMIN SERPL-MCNC: 3.7 G/DL (ref 3.5–5.2)
ALBUMIN SERPL-MCNC: 3.8 G/DL (ref 3.5–5.2)
ALBUMIN SERPL-MCNC: 4.1 G/DL (ref 3.5–5.2)
ALBUMIN/GLOB SERPL: 1.3 G/DL
ALBUMIN/GLOB SERPL: 1.6 G/DL
ALBUMIN/GLOB SERPL: 2.3 G/DL
ALP SERPL-CCNC: 86 U/L (ref 39–117)
ALP SERPL-CCNC: 87 U/L (ref 39–117)
ALP SERPL-CCNC: 94 U/L (ref 39–117)
ALP SERPL-CCNC: 96 U/L (ref 39–117)
ALP SERPL-CCNC: 96 U/L (ref 39–117)
ALT SERPL W P-5'-P-CCNC: 10 U/L (ref 1–33)
ALT SERPL W P-5'-P-CCNC: 1042 U/L (ref 1–33)
ALT SERPL W P-5'-P-CCNC: 11 U/L (ref 1–33)
ALT SERPL W P-5'-P-CCNC: 11 U/L (ref 1–33)
ALT SERPL W P-5'-P-CCNC: 13 U/L (ref 1–33)
ANION GAP SERPL CALCULATED.3IONS-SCNC: 11 MMOL/L (ref 5–15)
ANION GAP SERPL CALCULATED.3IONS-SCNC: 12.3 MMOL/L (ref 5–15)
ANION GAP SERPL CALCULATED.3IONS-SCNC: 13.4 MMOL/L (ref 5–15)
ANION GAP SERPL CALCULATED.3IONS-SCNC: 13.8 MMOL/L (ref 5–15)
ANION GAP SERPL CALCULATED.3IONS-SCNC: 17.9 MMOL/L (ref 5–15)
ANION GAP SERPL CALCULATED.3IONS-SCNC: 20.9 MMOL/L (ref 5–15)
ANION GAP SERPL CALCULATED.3IONS-SCNC: 37.2 MMOL/L (ref 5–15)
ANION GAP SERPL CALCULATED.3IONS-SCNC: 9.2 MMOL/L (ref 5–15)
ANION GAP SERPL CALCULATED.3IONS-SCNC: ABNORMAL MMOL/L
APTT PPP: 22.2 SECONDS (ref 22.2–34.2)
APTT PPP: 66.1 SECONDS (ref 22.2–34.2)
ARTERIAL PATENCY WRIST A: ABNORMAL
AST SERPL-CCNC: 10 U/L (ref 1–32)
AST SERPL-CCNC: 10 U/L (ref 1–32)
AST SERPL-CCNC: 16 U/L (ref 1–32)
AST SERPL-CCNC: 24 U/L (ref 1–32)
AST SERPL-CCNC: 2406 U/L (ref 1–32)
BACTERIA SPEC AEROBE CULT: ABNORMAL
BACTERIA SPEC AEROBE CULT: NORMAL
BACTERIA SPEC AEROBE CULT: NORMAL
BACTERIA SPEC RESP CULT: NORMAL
BACTERIA SPEC RESP CULT: NORMAL
BASE EXCESS BLDA CALC-SCNC: -11.7 MMOL/L (ref -2–2)
BASE EXCESS BLDA CALC-SCNC: -13.2 MMOL/L (ref -2–2)
BASE EXCESS BLDA CALC-SCNC: -13.8 MMOL/L (ref -2–2)
BASE EXCESS BLDA CALC-SCNC: -16.3 MMOL/L (ref -2–2)
BASE EXCESS BLDA CALC-SCNC: -6 MMOL/L (ref -2–2)
BASE EXCESS BLDV CALC-SCNC: -2.3 MMOL/L (ref -2–2)
BASOPHILS # BLD AUTO: 0.01 10*3/MM3 (ref 0–0.2)
BASOPHILS # BLD AUTO: 0.02 10*3/MM3 (ref 0–0.2)
BASOPHILS # BLD AUTO: 0.06 10*3/MM3 (ref 0–0.2)
BASOPHILS NFR BLD AUTO: 0.1 % (ref 0–1.5)
BASOPHILS NFR BLD AUTO: 0.2 % (ref 0–1.5)
BASOPHILS NFR BLD AUTO: 0.4 % (ref 0–1.5)
BDY SITE: ABNORMAL
BH CV ECHO MEAS - AO ROOT DIAM: 3.4 CM
BH CV ECHO MEAS - EDV(MOD-SP2): 97 ML
BH CV ECHO MEAS - EDV(MOD-SP4): 100 ML
BH CV ECHO MEAS - EF(MOD-BP): 50 %
BH CV ECHO MEAS - ESV(MOD-SP2): 48 ML
BH CV ECHO MEAS - ESV(MOD-SP4): 50 ML
BH CV ECHO MEAS - IVSD: 1.1 CM
BH CV ECHO MEAS - LA DIMENSION(2D): 4.3 CM
BH CV ECHO MEAS - LVIDD: 5.6 CM
BH CV ECHO MEAS - LVIDS: 4.4 CM
BH CV ECHO MEAS - LVOT DIAM: 2 CM
BH CV ECHO MEAS - LVPWD: 1.1 CM
BH CV ECHO MEAS - MV A MAX VEL: 47 CM/SEC
BH CV ECHO MEAS - MV DEC TIME: 137 MSEC
BH CV ECHO MEAS - MV E MAX VEL: 90 CM/SEC
BH CV ECHO MEAS - MV E/A: 1.9
BH CV ECHO MEAS - RVDD: 2.6 CM
BH CV ECHO MEAS - TR MAX PG: 61 MMHG
BILIRUB CONJ SERPL-MCNC: <0.2 MG/DL (ref 0–0.3)
BILIRUB INDIRECT SERPL-MCNC: NORMAL MG/DL
BILIRUB INDIRECT SERPL-MCNC: NORMAL MG/DL
BILIRUB SERPL-MCNC: 0.3 MG/DL (ref 0–1.2)
BILIRUB SERPL-MCNC: 1.3 MG/DL (ref 0–1.2)
BILIRUB SERPL-MCNC: <0.2 MG/DL (ref 0–1.2)
BLD GP AB SCN SERPL QL: NEGATIVE
BUN SERPL-MCNC: 31 MG/DL (ref 8–23)
BUN SERPL-MCNC: 31 MG/DL (ref 8–23)
BUN SERPL-MCNC: 34 MG/DL (ref 8–23)
BUN SERPL-MCNC: 42 MG/DL (ref 8–23)
BUN SERPL-MCNC: 43 MG/DL (ref 8–23)
BUN SERPL-MCNC: 45 MG/DL (ref 8–23)
BUN SERPL-MCNC: 80 MG/DL (ref 8–23)
BUN SERPL-MCNC: 91 MG/DL (ref 8–23)
BUN SERPL-MCNC: 92 MG/DL (ref 8–23)
BUN/CREAT SERPL: 12.8 (ref 7–25)
BUN/CREAT SERPL: 13.4 (ref 7–25)
BUN/CREAT SERPL: 14 (ref 7–25)
BUN/CREAT SERPL: 14.2 (ref 7–25)
BUN/CREAT SERPL: 14.5 (ref 7–25)
BUN/CREAT SERPL: 15.2 (ref 7–25)
BUN/CREAT SERPL: 38.5 (ref 7–25)
BUN/CREAT SERPL: 42.6 (ref 7–25)
BUN/CREAT SERPL: 46.4 (ref 7–25)
CA-I BLDA-SCNC: 0.97 MMOL/L (ref 1.13–1.32)
CA-I BLDA-SCNC: 1.01 MMOL/L (ref 1.13–1.32)
CA-I BLDA-SCNC: 1.12 MMOL/L (ref 1.13–1.32)
CA-I BLDA-SCNC: 1.24 MMOL/L (ref 1.13–1.32)
CA-I BLDA-SCNC: 1.32 MMOL/L (ref 1.13–1.32)
CALCIUM SPEC-SCNC: 6.7 MG/DL (ref 8.2–9.6)
CALCIUM SPEC-SCNC: 7 MG/DL (ref 8.2–9.6)
CALCIUM SPEC-SCNC: 8.3 MG/DL (ref 8.2–9.6)
CALCIUM SPEC-SCNC: 8.3 MG/DL (ref 8.2–9.6)
CALCIUM SPEC-SCNC: 8.7 MG/DL (ref 8.2–9.6)
CALCIUM SPEC-SCNC: 8.8 MG/DL (ref 8.2–9.6)
CALCIUM SPEC-SCNC: 8.8 MG/DL (ref 8.2–9.6)
CALCIUM SPEC-SCNC: 9.2 MG/DL (ref 8.2–9.6)
CALCIUM SPEC-SCNC: 9.5 MG/DL (ref 8.2–9.6)
CHLORIDE BLDA-SCNC: 105 MMOL/L (ref 98–106)
CHLORIDE BLDA-SCNC: 109 MMOL/L (ref 98–106)
CHLORIDE BLDA-SCNC: 110 MMOL/L (ref 98–106)
CHLORIDE BLDA-SCNC: 110 MMOL/L (ref 98–106)
CHLORIDE BLDV-SCNC: 106 MMOL/L (ref 98–106)
CHLORIDE SERPL-SCNC: 101 MMOL/L (ref 98–107)
CHLORIDE SERPL-SCNC: 102 MMOL/L (ref 98–107)
CHLORIDE SERPL-SCNC: 104 MMOL/L (ref 98–107)
CHLORIDE SERPL-SCNC: 105 MMOL/L (ref 98–107)
CHLORIDE SERPL-SCNC: 105 MMOL/L (ref 98–107)
CHLORIDE SERPL-SCNC: 106 MMOL/L (ref 98–107)
CHLORIDE SERPL-SCNC: 108 MMOL/L (ref 98–107)
CHLORIDE SERPL-SCNC: 114 MMOL/L (ref 98–107)
CHLORIDE SERPL-SCNC: 98 MMOL/L (ref 98–107)
CHOLEST SERPL-MCNC: 181 MG/DL (ref 0–200)
CO2 SERPL-SCNC: 15.1 MMOL/L (ref 22–29)
CO2 SERPL-SCNC: 23 MMOL/L (ref 22–29)
CO2 SERPL-SCNC: 24.7 MMOL/L (ref 22–29)
CO2 SERPL-SCNC: 25.8 MMOL/L (ref 22–29)
CO2 SERPL-SCNC: 26.1 MMOL/L (ref 22–29)
CO2 SERPL-SCNC: 28.2 MMOL/L (ref 22–29)
CO2 SERPL-SCNC: 30.6 MMOL/L (ref 22–29)
CO2 SERPL-SCNC: 9.8 MMOL/L (ref 22–29)
CO2 SERPL-SCNC: >50 MMOL/L (ref 22–29)
COHGB MFR BLD: 0.1 % (ref 0–1.5)
COHGB MFR BLD: 0.3 % (ref 0–1.5)
COHGB MFR BLD: 2.3 % (ref 0–1.5)
CREAT SERPL-MCNC: 1.96 MG/DL (ref 0.57–1)
CREAT SERPL-MCNC: 2.08 MG/DL (ref 0.57–1)
CREAT SERPL-MCNC: 2.16 MG/DL (ref 0.57–1)
CREAT SERPL-MCNC: 2.19 MG/DL (ref 0.57–1)
CREAT SERPL-MCNC: 2.22 MG/DL (ref 0.57–1)
CREAT SERPL-MCNC: 2.54 MG/DL (ref 0.57–1)
CREAT SERPL-MCNC: 2.76 MG/DL (ref 0.57–1)
CREAT SERPL-MCNC: 3.11 MG/DL (ref 0.57–1)
CREAT SERPL-MCNC: 3.36 MG/DL (ref 0.57–1)
CRP SERPL-MCNC: 2.31 MG/DL (ref 0–0.5)
D DIMER PPP FEU-MCNC: 0.66 MG/L (FEU) (ref 0–0.59)
D-LACTATE SERPL-SCNC: 16.5 MMOL/L (ref 0.5–2)
D-LACTATE SERPL-SCNC: 19.8 MMOL/L (ref 0.5–2)
DEPRECATED RDW RBC AUTO: 50.2 FL (ref 37–54)
DEPRECATED RDW RBC AUTO: 50.5 FL (ref 37–54)
DEPRECATED RDW RBC AUTO: 52.4 FL (ref 37–54)
DEPRECATED RDW RBC AUTO: 59.4 FL (ref 37–54)
DEPRECATED RDW RBC AUTO: 59.5 FL (ref 37–54)
DEPRECATED RDW RBC AUTO: 59.7 FL (ref 37–54)
EOSINOPHIL # BLD AUTO: 0 10*3/MM3 (ref 0–0.4)
EOSINOPHIL # BLD AUTO: 0.02 10*3/MM3 (ref 0–0.4)
EOSINOPHIL NFR BLD AUTO: 0 % (ref 0.3–6.2)
EOSINOPHIL NFR BLD AUTO: 0.2 % (ref 0.3–6.2)
ERYTHROCYTE [DISTWIDTH] IN BLOOD BY AUTOMATED COUNT: 15.8 % (ref 12.3–15.4)
ERYTHROCYTE [DISTWIDTH] IN BLOOD BY AUTOMATED COUNT: 15.9 % (ref 12.3–15.4)
ERYTHROCYTE [DISTWIDTH] IN BLOOD BY AUTOMATED COUNT: 16 % (ref 12.3–15.4)
ERYTHROCYTE [DISTWIDTH] IN BLOOD BY AUTOMATED COUNT: 17.8 % (ref 12.3–15.4)
ERYTHROCYTE [DISTWIDTH] IN BLOOD BY AUTOMATED COUNT: 18.1 % (ref 12.3–15.4)
ERYTHROCYTE [DISTWIDTH] IN BLOOD BY AUTOMATED COUNT: 18.2 % (ref 12.3–15.4)
ERYTHROCYTE [SEDIMENTATION RATE] IN BLOOD: 13 MM/HR (ref 0–30)
FERRITIN SERPL-MCNC: 316.1 NG/ML (ref 13–150)
FHHB: 1.5 % (ref 0–5)
FHHB: 2 % (ref 0–5)
FHHB: 3.7 % (ref 0–5)
FHHB: 3.7 % (ref 0–5)
FHHB: 31.1 % (ref 0–5)
FHHB: 82.7 % (ref 0–5)
GAS FLOW AIRWAY: 3 LPM
GAS FLOW AIRWAY: ABNORMAL L/MIN
GFR SERPL CREATININE-BSD FRML MDRD: 15 ML/MIN/1.73
GFR SERPL CREATININE-BSD FRML MDRD: 17 ML/MIN/1.73
GFR SERPL CREATININE-BSD FRML MDRD: 19 ML/MIN/1.73
GFR SERPL CREATININE-BSD FRML MDRD: 21 ML/MIN/1.73
GFR SERPL CREATININE-BSD FRML MDRD: 25 ML/MIN/1.73
GFR SERPL CREATININE-BSD FRML MDRD: 25 ML/MIN/1.73
GFR SERPL CREATININE-BSD FRML MDRD: 26 ML/MIN/1.73
GFR SERPL CREATININE-BSD FRML MDRD: 27 ML/MIN/1.73
GFR SERPL CREATININE-BSD FRML MDRD: 29 ML/MIN/1.73
GLOBULIN UR ELPH-MCNC: 1 GM/DL
GLOBULIN UR ELPH-MCNC: 2.5 GM/DL
GLOBULIN UR ELPH-MCNC: 2.8 GM/DL
GLUCOSE BLDA-MCNC: 124 MMOL/L (ref 65–99)
GLUCOSE BLDA-MCNC: 148 MMOL/L (ref 65–99)
GLUCOSE BLDA-MCNC: 167 MMOL/L (ref 65–99)
GLUCOSE BLDA-MCNC: ABNORMAL MG/DL
GLUCOSE BLDA-MCNC: ABNORMAL MG/DL
GLUCOSE BLDC GLUCOMTR-MCNC: 117 MG/DL (ref 70–99)
GLUCOSE BLDC GLUCOMTR-MCNC: 13 MG/DL (ref 70–99)
GLUCOSE BLDC GLUCOMTR-MCNC: 142 MG/DL (ref 70–99)
GLUCOSE BLDC GLUCOMTR-MCNC: 171 MG/DL (ref 70–99)
GLUCOSE BLDC GLUCOMTR-MCNC: 199 MG/DL (ref 70–99)
GLUCOSE BLDC GLUCOMTR-MCNC: 20 MG/DL (ref 70–99)
GLUCOSE BLDC GLUCOMTR-MCNC: 200 MG/DL (ref 70–99)
GLUCOSE BLDC GLUCOMTR-MCNC: 208 MG/DL (ref 70–99)
GLUCOSE BLDC GLUCOMTR-MCNC: 208 MG/DL (ref 70–99)
GLUCOSE BLDC GLUCOMTR-MCNC: 224 MG/DL (ref 70–99)
GLUCOSE BLDC GLUCOMTR-MCNC: 242 MG/DL (ref 70–99)
GLUCOSE BLDC GLUCOMTR-MCNC: 248 MG/DL (ref 70–99)
GLUCOSE BLDC GLUCOMTR-MCNC: 78 MG/DL (ref 70–99)
GLUCOSE BLDC GLUCOMTR-MCNC: 84 MG/DL (ref 70–99)
GLUCOSE SERPL-MCNC: 131 MG/DL (ref 65–99)
GLUCOSE SERPL-MCNC: 144 MG/DL (ref 65–99)
GLUCOSE SERPL-MCNC: 145 MG/DL (ref 65–99)
GLUCOSE SERPL-MCNC: 165 MG/DL (ref 65–99)
GLUCOSE SERPL-MCNC: 174 MG/DL (ref 65–99)
GLUCOSE SERPL-MCNC: 191 MG/DL (ref 65–99)
GLUCOSE SERPL-MCNC: 245 MG/DL (ref 65–99)
GLUCOSE SERPL-MCNC: 268 MG/DL (ref 65–99)
GLUCOSE SERPL-MCNC: 54 MG/DL (ref 65–99)
GRAM STN SPEC: NORMAL
HBA1C MFR BLD: 5.8 % (ref 4.8–5.6)
HCO3 BLDA-SCNC: 10.9 MMOL/L (ref 22–26)
HCO3 BLDA-SCNC: 12.9 MMOL/L (ref 22–26)
HCO3 BLDA-SCNC: 13.8 MMOL/L (ref 22–26)
HCO3 BLDA-SCNC: 15.1 MMOL/L (ref 22–26)
HCO3 BLDA-SCNC: 23.4 MMOL/L (ref 22–26)
HCO3 BLDV-SCNC: 24.7 MMOL/L (ref 22–26)
HCT VFR BLD AUTO: 24.4 % (ref 34–46.6)
HCT VFR BLD AUTO: 33.3 % (ref 34–46.6)
HCT VFR BLD AUTO: 33.9 % (ref 34–46.6)
HCT VFR BLD AUTO: 34.7 % (ref 34–46.6)
HCT VFR BLD AUTO: 35.8 % (ref 34–46.6)
HCT VFR BLD AUTO: 35.9 % (ref 34–46.6)
HCT VFR BLD AUTO: 38.3 % (ref 34–46.6)
HCT VFR BLD AUTO: 41.1 % (ref 34–46.6)
HDLC SERPL-MCNC: 74 MG/DL (ref 40–60)
HGB BLD-MCNC: 10 G/DL (ref 12–15.9)
HGB BLD-MCNC: 10.6 G/DL (ref 12–15.9)
HGB BLD-MCNC: 10.9 G/DL (ref 12–15.9)
HGB BLD-MCNC: 10.9 G/DL (ref 12–15.9)
HGB BLD-MCNC: 11.4 G/DL (ref 12–15.9)
HGB BLD-MCNC: 12 G/DL (ref 12–15.9)
HGB BLD-MCNC: 13.3 G/DL (ref 12–15.9)
HGB BLD-MCNC: 8.1 G/DL (ref 12–15.9)
HGB BLDA-MCNC: 10.6 G/DL (ref 11.7–14.6)
HGB BLDA-MCNC: 11.2 G/DL (ref 11.7–14.6)
HGB BLDA-MCNC: 11.3 G/DL (ref 11.7–14.6)
HGB BLDA-MCNC: 11.9 G/DL (ref 11.7–14.6)
HGB BLDA-MCNC: 13.4 G/DL (ref 11.7–14.6)
HGB BLDA-MCNC: 9.8 G/DL (ref 11.7–14.6)
HOLD SPECIMEN: NORMAL
HYPOCHROMIA BLD QL: NORMAL
IMM GRANULOCYTES # BLD AUTO: 0.02 10*3/MM3 (ref 0–0.05)
IMM GRANULOCYTES # BLD AUTO: 0.07 10*3/MM3 (ref 0–0.05)
IMM GRANULOCYTES # BLD AUTO: 0.12 10*3/MM3 (ref 0–0.05)
IMM GRANULOCYTES # BLD AUTO: 0.2 10*3/MM3 (ref 0–0.05)
IMM GRANULOCYTES # BLD AUTO: 0.42 10*3/MM3 (ref 0–0.05)
IMM GRANULOCYTES NFR BLD AUTO: 0.3 % (ref 0–0.5)
IMM GRANULOCYTES NFR BLD AUTO: 0.7 % (ref 0–0.5)
IMM GRANULOCYTES NFR BLD AUTO: 1.4 % (ref 0–0.5)
IMM GRANULOCYTES NFR BLD AUTO: 2.2 % (ref 0–0.5)
IMM GRANULOCYTES NFR BLD AUTO: 3 % (ref 0–0.5)
INHALED O2 CONCENTRATION: 100 %
INHALED O2 CONCENTRATION: 100 %
INHALED O2 CONCENTRATION: 32 %
INHALED O2 CONCENTRATION: 50 %
INHALED O2 CONCENTRATION: 50 %
INHALED O2 CONCENTRATION: 70 %
INR PPP: 1.02 (ref 2–3)
INR PPP: 1.1 (ref 2–3)
INR PPP: 1.12 (ref 2–3)
INR PPP: 3.21 (ref 2–3)
INR PPP: 3.62 (ref 2–3)
IVRT: 66 MSEC
LACTATE BLDA-SCNC: 1.21 MMOL/L (ref 0.5–2)
LACTATE BLDA-SCNC: 11.51 MMOL/L (ref 0.5–2)
LACTATE BLDA-SCNC: 11.75 MMOL/L (ref 0.5–2)
LACTATE BLDA-SCNC: 5.32 MMOL/L (ref 0.5–2)
LACTATE BLDA-SCNC: 5.5 MMOL/L (ref 0.5–2)
LACTATE BLDA-SCNC: ABNORMAL MMOL/L
LARGE PLATELETS: NORMAL
LARGE PLATELETS: NORMAL
LDLC SERPL CALC-MCNC: 94 MG/DL (ref 0–100)
LDLC/HDLC SERPL: 1.25 {RATIO}
LEFT ATRIUM VOLUME INDEX: 23.9 ML/M2
LYMPHOCYTES # BLD AUTO: 0.68 10*3/MM3 (ref 0.7–3.1)
LYMPHOCYTES # BLD AUTO: 0.82 10*3/MM3 (ref 0.7–3.1)
LYMPHOCYTES # BLD AUTO: 0.98 10*3/MM3 (ref 0.7–3.1)
LYMPHOCYTES # BLD AUTO: 1.34 10*3/MM3 (ref 0.7–3.1)
LYMPHOCYTES # BLD AUTO: 1.67 10*3/MM3 (ref 0.7–3.1)
LYMPHOCYTES NFR BLD AUTO: 11.5 % (ref 19.6–45.3)
LYMPHOCYTES NFR BLD AUTO: 18.4 % (ref 19.6–45.3)
LYMPHOCYTES NFR BLD AUTO: 8 % (ref 19.6–45.3)
LYMPHOCYTES NFR BLD AUTO: 9.6 % (ref 19.6–45.3)
LYMPHOCYTES NFR BLD AUTO: 9.6 % (ref 19.6–45.3)
MAGNESIUM SERPL-MCNC: 2 MG/DL (ref 1.7–2.3)
MAGNESIUM SERPL-MCNC: 2 MG/DL (ref 1.7–2.3)
MAGNESIUM SERPL-MCNC: 2.2 MG/DL (ref 1.7–2.3)
MAGNESIUM SERPL-MCNC: 2.3 MG/DL (ref 1.7–2.3)
MAGNESIUM SERPL-MCNC: 2.3 MG/DL (ref 1.7–2.3)
MAXIMAL PREDICTED HEART RATE: 124 BPM
MCH RBC QN AUTO: 27 PG (ref 26.6–33)
MCH RBC QN AUTO: 27.1 PG (ref 26.6–33)
MCH RBC QN AUTO: 27.5 PG (ref 26.6–33)
MCH RBC QN AUTO: 28.8 PG (ref 26.6–33)
MCH RBC QN AUTO: 29 PG (ref 26.6–33)
MCH RBC QN AUTO: 29.7 PG (ref 26.6–33)
MCHC RBC AUTO-ENTMCNC: 30.4 G/DL (ref 31.5–35.7)
MCHC RBC AUTO-ENTMCNC: 31.3 G/DL (ref 31.5–35.7)
MCHC RBC AUTO-ENTMCNC: 31.4 G/DL (ref 31.5–35.7)
MCHC RBC AUTO-ENTMCNC: 31.8 G/DL (ref 31.5–35.7)
MCHC RBC AUTO-ENTMCNC: 32.4 G/DL (ref 31.5–35.7)
MCHC RBC AUTO-ENTMCNC: 33.2 G/DL (ref 31.5–35.7)
MCV RBC AUTO: 86.7 FL (ref 79–97)
MCV RBC AUTO: 87.6 FL (ref 79–97)
MCV RBC AUTO: 88.8 FL (ref 79–97)
MCV RBC AUTO: 89.4 FL (ref 79–97)
MCV RBC AUTO: 89.7 FL (ref 79–97)
MCV RBC AUTO: 90.7 FL (ref 79–97)
METHGB BLD QL: 0.1 % (ref 0–1.5)
METHGB BLD QL: 0.1 % (ref 0–1.5)
METHGB BLD QL: 0.3 % (ref 0–1.5)
METHGB BLD QL: 1.8 % (ref 0–1.5)
MODALITY: ABNORMAL
MONOCYTES # BLD AUTO: 0.11 10*3/MM3 (ref 0.1–0.9)
MONOCYTES # BLD AUTO: 0.63 10*3/MM3 (ref 0.1–0.9)
MONOCYTES # BLD AUTO: 0.66 10*3/MM3 (ref 0.1–0.9)
MONOCYTES # BLD AUTO: 0.99 10*3/MM3 (ref 0.1–0.9)
MONOCYTES # BLD AUTO: 1.01 10*3/MM3 (ref 0.1–0.9)
MONOCYTES NFR BLD AUTO: 1.5 % (ref 5–12)
MONOCYTES NFR BLD AUTO: 10.9 % (ref 5–12)
MONOCYTES NFR BLD AUTO: 6.4 % (ref 5–12)
MONOCYTES NFR BLD AUTO: 7.2 % (ref 5–12)
MONOCYTES NFR BLD AUTO: 7.4 % (ref 5–12)
NEUTROPHILS NFR BLD AUTO: 11.2 10*3/MM3 (ref 1.7–7)
NEUTROPHILS NFR BLD AUTO: 6.2 10*3/MM3 (ref 1.7–7)
NEUTROPHILS NFR BLD AUTO: 6.28 10*3/MM3 (ref 1.7–7)
NEUTROPHILS NFR BLD AUTO: 6.81 10*3/MM3 (ref 1.7–7)
NEUTROPHILS NFR BLD AUTO: 68.3 % (ref 42.7–76)
NEUTROPHILS NFR BLD AUTO: 79.6 % (ref 42.7–76)
NEUTROPHILS NFR BLD AUTO: 79.8 % (ref 42.7–76)
NEUTROPHILS NFR BLD AUTO: 8.68 10*3/MM3 (ref 1.7–7)
NEUTROPHILS NFR BLD AUTO: 84.6 % (ref 42.7–76)
NEUTROPHILS NFR BLD AUTO: 88.5 % (ref 42.7–76)
NOTE: ABNORMAL
NRBC BLD AUTO-RTO: 0 /100 WBC (ref 0–0.2)
NRBC BLD AUTO-RTO: 0.4 /100 WBC (ref 0–0.2)
NT-PROBNP SERPL-MCNC: ABNORMAL PG/ML (ref 0–1800)
OVALOCYTES BLD QL SMEAR: NORMAL
OXYHGB MFR BLDV: 15.2 % (ref 94–99)
OXYHGB MFR BLDV: 66.5 % (ref 94–99)
OXYHGB MFR BLDV: 95.7 % (ref 94–99)
OXYHGB MFR BLDV: 95.7 % (ref 94–99)
OXYHGB MFR BLDV: 97.6 % (ref 94–99)
OXYHGB MFR BLDV: 98.1 % (ref 94–99)
PCO2 BLDA: 30.7 MM HG (ref 35–45)
PCO2 BLDA: 33 MM HG (ref 35–45)
PCO2 BLDA: 35.8 MM HG (ref 35–45)
PCO2 BLDA: 37.1 MM HG (ref 35–45)
PCO2 BLDA: 70.2 MM HG (ref 35–45)
PCO2 BLDV: 51.6 MM HG (ref 41–51)
PEEP RESPIRATORY: 5 CM[H2O]
PH BLDA: 7.14 PH UNITS (ref 7.35–7.45)
PH BLDA: 7.17 PH UNITS (ref 7.35–7.45)
PH BLDA: 7.21 PH UNITS (ref 7.35–7.45)
PH BLDA: 7.21 PH UNITS (ref 7.35–7.45)
PH BLDA: 7.23 PH UNITS (ref 7.35–7.45)
PH BLDV: 7.3 PH UNITS (ref 7.31–7.41)
PHOSPHATE SERPL-MCNC: 3.6 MG/DL (ref 2.5–4.5)
PHOSPHATE SERPL-MCNC: 3.6 MG/DL (ref 2.5–4.5)
PHOSPHATE SERPL-MCNC: 4.2 MG/DL (ref 2.5–4.5)
PHOSPHATE SERPL-MCNC: 4.7 MG/DL (ref 2.5–4.5)
PHOSPHATE SERPL-MCNC: 5.3 MG/DL (ref 2.5–4.5)
PHOSPHATE SERPL-MCNC: 8.2 MG/DL (ref 2.5–4.5)
PHOSPHATE SERPL-MCNC: 9 MG/DL (ref 2.5–4.5)
PLATELET # BLD AUTO: 123 10*3/MM3 (ref 140–450)
PLATELET # BLD AUTO: 154 10*3/MM3 (ref 140–450)
PLATELET # BLD AUTO: 194 10*3/MM3 (ref 140–450)
PLATELET # BLD AUTO: 201 10*3/MM3 (ref 140–450)
PLATELET # BLD AUTO: 227 10*3/MM3 (ref 140–450)
PLATELET # BLD AUTO: 72 10*3/MM3 (ref 140–450)
PMV BLD AUTO: 10.1 FL (ref 6–12)
PMV BLD AUTO: 10.8 FL (ref 6–12)
PMV BLD AUTO: 11 FL (ref 6–12)
PMV BLD AUTO: 8.7 FL (ref 6–12)
PO2 BLD: 223 MM[HG] (ref 0–500)
PO2 BLD: 229 MM[HG] (ref 0–500)
PO2 BLD: 229 MM[HG] (ref 0–500)
PO2 BLD: 296 MM[HG] (ref 0–500)
PO2 BLD: <41 MM[HG] (ref 0–500)
PO2 BLDA: 114.5 MM HG (ref 80–100)
PO2 BLDA: 114.7 MM HG (ref 80–100)
PO2 BLDA: 156.3 MM HG (ref 80–100)
PO2 BLDA: 296.2 MM HG (ref 80–100)
PO2 BLDA: <40.5 MM HG (ref 80–100)
PO2 BLDV: 37.4 MM HG (ref 35–42)
POTASSIUM BLDA-SCNC: 3.77 MMOL/L (ref 3.5–5)
POTASSIUM BLDA-SCNC: 4.75 MMOL/L (ref 3.5–5)
POTASSIUM BLDA-SCNC: 5.52 MMOL/L (ref 3.5–5)
POTASSIUM BLDA-SCNC: 5.85 MMOL/L (ref 3.5–5)
POTASSIUM BLDV-SCNC: 3.7 MMOL/L (ref 3.5–5)
POTASSIUM SERPL-SCNC: 3.8 MMOL/L (ref 3.5–5.2)
POTASSIUM SERPL-SCNC: 4 MMOL/L (ref 3.5–5.2)
POTASSIUM SERPL-SCNC: 4.3 MMOL/L (ref 3.5–5.2)
POTASSIUM SERPL-SCNC: 4.8 MMOL/L (ref 3.5–5.2)
POTASSIUM SERPL-SCNC: 5.5 MMOL/L (ref 3.5–5.2)
POTASSIUM SERPL-SCNC: 5.8 MMOL/L (ref 3.5–5.2)
POTASSIUM SERPL-SCNC: 6.5 MMOL/L (ref 3.5–5.2)
PROT SERPL-MCNC: 3.3 G/DL (ref 6–8.5)
PROT SERPL-MCNC: 6.2 G/DL (ref 6–8.5)
PROT SERPL-MCNC: 6.5 G/DL (ref 6–8.5)
PROT SERPL-MCNC: 6.6 G/DL (ref 6–8.5)
PROT SERPL-MCNC: 6.6 G/DL (ref 6–8.5)
PROTHROMBIN TIME: 10.7 SECONDS (ref 9.4–12)
PROTHROMBIN TIME: 11.3 SECONDS (ref 9.4–12)
PROTHROMBIN TIME: 11.5 SECONDS (ref 9.4–12)
PROTHROMBIN TIME: 31 SECONDS (ref 9.4–12)
PROTHROMBIN TIME: 34.9 SECONDS (ref 9.4–12)
QT INTERVAL: 441 MS
QT INTERVAL: 467 MS
RBC # BLD AUTO: 2.73 10*6/MM3 (ref 3.77–5.28)
RBC # BLD AUTO: 3.91 10*6/MM3 (ref 3.77–5.28)
RBC # BLD AUTO: 3.96 10*6/MM3 (ref 3.77–5.28)
RBC # BLD AUTO: 3.96 10*6/MM3 (ref 3.77–5.28)
RBC # BLD AUTO: 4.03 10*6/MM3 (ref 3.77–5.28)
RBC # BLD AUTO: 4.58 10*6/MM3 (ref 3.77–5.28)
RBC MORPH BLD: NORMAL
RH BLD: POSITIVE
RH BLD: POSITIVE
SAO2 % BLDCOA: 15.5 % (ref 95–99)
SAO2 % BLDCOA: 96.3 % (ref 95–99)
SAO2 % BLDCOA: 96.3 % (ref 95–99)
SAO2 % BLDCOA: 98 % (ref 95–99)
SAO2 % BLDCOA: 98.5 % (ref 95–99)
SAO2 % BLDCOV: 68.1 % (ref 45–75)
SARS-COV-2 RNA PNL SPEC NAA+PROBE: NOT DETECTED
SET MECH RESP RATE: 24
SMALL PLATELETS BLD QL SMEAR: ADEQUATE
SMALL PLATELETS BLD QL SMEAR: NORMAL
SODIUM BLDA-SCNC: 140.5 MMOL/L (ref 136–146)
SODIUM BLDA-SCNC: 141 MMOL/L (ref 136–146)
SODIUM BLDA-SCNC: 143.8 MMOL/L (ref 136–146)
SODIUM BLDA-SCNC: 144.6 MMOL/L (ref 136–146)
SODIUM BLDV-SCNC: 139.9 MMOL/L (ref 136–146)
SODIUM SERPL-SCNC: 139 MMOL/L (ref 136–145)
SODIUM SERPL-SCNC: 139 MMOL/L (ref 136–145)
SODIUM SERPL-SCNC: 142 MMOL/L (ref 136–145)
SODIUM SERPL-SCNC: 142 MMOL/L (ref 136–145)
SODIUM SERPL-SCNC: 143 MMOL/L (ref 136–145)
SODIUM SERPL-SCNC: 144 MMOL/L (ref 136–145)
SODIUM SERPL-SCNC: 146 MMOL/L (ref 136–145)
SODIUM SERPL-SCNC: 153 MMOL/L (ref 136–145)
SODIUM SERPL-SCNC: >180 MMOL/L (ref 136–145)
STOMATOCYTES BLD QL SMEAR: NORMAL
STRESS TARGET HR: 105 BPM
T&S EXPIRATION DATE: NORMAL
TRIGL SERPL-MCNC: 71 MG/DL (ref 0–150)
TROPONIN T SERPL-MCNC: 0.03 NG/ML (ref 0–0.03)
VENTILATOR MODE: AC
VLDLC SERPL-MCNC: 13 MG/DL (ref 5–40)
WBC MORPH BLD: NORMAL
WBC MORPH BLD: NORMAL
WBC NRBC COR # BLD: 10.26 10*3/MM3 (ref 3.4–10.8)
WBC NRBC COR # BLD: 10.56 10*3/MM3 (ref 3.4–10.8)
WBC NRBC COR # BLD: 14.03 10*3/MM3 (ref 3.4–10.8)
WBC NRBC COR # BLD: 7.1 10*3/MM3 (ref 3.4–10.8)
WBC NRBC COR # BLD: 8.55 10*3/MM3 (ref 3.4–10.8)
WBC NRBC COR # BLD: 9.08 10*3/MM3 (ref 3.4–10.8)
WHOLE BLOOD HOLD SPECIMEN: NORMAL

## 2022-01-01 PROCEDURE — 93010 ELECTROCARDIOGRAM REPORT: CPT | Performed by: INTERNAL MEDICINE

## 2022-01-01 PROCEDURE — 85652 RBC SED RATE AUTOMATED: CPT | Performed by: FAMILY MEDICINE

## 2022-01-01 PROCEDURE — 94799 UNLISTED PULMONARY SVC/PX: CPT

## 2022-01-01 PROCEDURE — 36415 COLL VENOUS BLD VENIPUNCTURE: CPT | Performed by: GENERAL PRACTICE

## 2022-01-01 PROCEDURE — C1713 ANCHOR/SCREW BN/BN,TIS/BN: HCPCS | Performed by: STUDENT IN AN ORGANIZED HEALTH CARE EDUCATION/TRAINING PROGRAM

## 2022-01-01 PROCEDURE — 73502 X-RAY EXAM HIP UNI 2-3 VIEWS: CPT

## 2022-01-01 PROCEDURE — 25010000002 ALBUMIN HUMAN 25% PER 50 ML: Performed by: PHYSICIAN ASSISTANT

## 2022-01-01 PROCEDURE — 86900 BLOOD TYPING SEROLOGIC ABO: CPT | Performed by: EMERGENCY MEDICINE

## 2022-01-01 PROCEDURE — 25010000002 METHYLPREDNISOLONE PER 40 MG: Performed by: INTERNAL MEDICINE

## 2022-01-01 PROCEDURE — 83735 ASSAY OF MAGNESIUM: CPT | Performed by: STUDENT IN AN ORGANIZED HEALTH CARE EDUCATION/TRAINING PROGRAM

## 2022-01-01 PROCEDURE — 99231 SBSQ HOSP IP/OBS SF/LOW 25: CPT | Performed by: INTERNAL MEDICINE

## 2022-01-01 PROCEDURE — 84100 ASSAY OF PHOSPHORUS: CPT | Performed by: INTERNAL MEDICINE

## 2022-01-01 PROCEDURE — 25010000002 MORPHINE PER 10 MG: Performed by: GENERAL PRACTICE

## 2022-01-01 PROCEDURE — 0 CEFAZOLIN IN DEXTROSE 2-4 GM/100ML-% SOLUTION: Performed by: STUDENT IN AN ORGANIZED HEALTH CARE EDUCATION/TRAINING PROGRAM

## 2022-01-01 PROCEDURE — 80053 COMPREHEN METABOLIC PANEL: CPT | Performed by: INTERNAL MEDICINE

## 2022-01-01 PROCEDURE — 99221 1ST HOSP IP/OBS SF/LOW 40: CPT | Performed by: STUDENT IN AN ORGANIZED HEALTH CARE EDUCATION/TRAINING PROGRAM

## 2022-01-01 PROCEDURE — 84100 ASSAY OF PHOSPHORUS: CPT | Performed by: GENERAL PRACTICE

## 2022-01-01 PROCEDURE — 84100 ASSAY OF PHOSPHORUS: CPT | Performed by: FAMILY MEDICINE

## 2022-01-01 PROCEDURE — 85014 HEMATOCRIT: CPT | Performed by: INTERNAL MEDICINE

## 2022-01-01 PROCEDURE — 25010000002 AMIODARONE PER 30 MG: Performed by: STUDENT IN AN ORGANIZED HEALTH CARE EDUCATION/TRAINING PROGRAM

## 2022-01-01 PROCEDURE — 0BH18EZ INSERTION OF ENDOTRACHEAL AIRWAY INTO TRACHEA, VIA NATURAL OR ARTIFICIAL OPENING ENDOSCOPIC: ICD-10-PCS | Performed by: ANESTHESIOLOGY

## 2022-01-01 PROCEDURE — 36556 INSERT NON-TUNNEL CV CATH: CPT | Performed by: INTERNAL MEDICINE

## 2022-01-01 PROCEDURE — 99233 SBSQ HOSP IP/OBS HIGH 50: CPT | Performed by: FAMILY MEDICINE

## 2022-01-01 PROCEDURE — 93280 PM DEVICE PROGR EVAL DUAL: CPT | Performed by: INTERNAL MEDICINE

## 2022-01-01 PROCEDURE — 80076 HEPATIC FUNCTION PANEL: CPT | Performed by: FAMILY MEDICINE

## 2022-01-01 PROCEDURE — 63710000001 INSULIN LISPRO (HUMAN) PER 5 UNITS: Performed by: FAMILY MEDICINE

## 2022-01-01 PROCEDURE — 25010000002 AZITHROMYCIN PER 500 MG: Performed by: FAMILY MEDICINE

## 2022-01-01 PROCEDURE — 97161 PT EVAL LOW COMPLEX 20 MIN: CPT

## 2022-01-01 PROCEDURE — 80048 BASIC METABOLIC PNL TOTAL CA: CPT | Performed by: GENERAL PRACTICE

## 2022-01-01 PROCEDURE — 74150 CT ABDOMEN W/O CONTRAST: CPT

## 2022-01-01 PROCEDURE — 80061 LIPID PANEL: CPT | Performed by: GENERAL PRACTICE

## 2022-01-01 PROCEDURE — 87040 BLOOD CULTURE FOR BACTERIA: CPT | Performed by: GENERAL PRACTICE

## 2022-01-01 PROCEDURE — 25010000002 ONDANSETRON PER 1 MG: Performed by: EMERGENCY MEDICINE

## 2022-01-01 PROCEDURE — U0004 COV-19 TEST NON-CDC HGH THRU: HCPCS | Performed by: STUDENT IN AN ORGANIZED HEALTH CARE EDUCATION/TRAINING PROGRAM

## 2022-01-01 PROCEDURE — C1776 JOINT DEVICE (IMPLANTABLE): HCPCS | Performed by: STUDENT IN AN ORGANIZED HEALTH CARE EDUCATION/TRAINING PROGRAM

## 2022-01-01 PROCEDURE — 87205 SMEAR GRAM STAIN: CPT | Performed by: PHYSICIAN ASSISTANT

## 2022-01-01 PROCEDURE — 5A12012 PERFORMANCE OF CARDIAC OUTPUT, SINGLE, MANUAL: ICD-10-PCS | Performed by: INTERNAL MEDICINE

## 2022-01-01 PROCEDURE — B548ZZA ULTRASONOGRAPHY OF SUPERIOR VENA CAVA, GUIDANCE: ICD-10-PCS | Performed by: INTERNAL MEDICINE

## 2022-01-01 PROCEDURE — 25010000002 KETOROLAC TROMETHAMINE PER 15 MG: Performed by: STUDENT IN AN ORGANIZED HEALTH CARE EDUCATION/TRAINING PROGRAM

## 2022-01-01 PROCEDURE — 85007 BL SMEAR W/DIFF WBC COUNT: CPT | Performed by: EMERGENCY MEDICINE

## 2022-01-01 PROCEDURE — 83050 HGB METHEMOGLOBIN QUAN: CPT | Performed by: INTERNAL MEDICINE

## 2022-01-01 PROCEDURE — 85027 COMPLETE CBC AUTOMATED: CPT | Performed by: GENERAL PRACTICE

## 2022-01-01 PROCEDURE — 80048 BASIC METABOLIC PNL TOTAL CA: CPT | Performed by: FAMILY MEDICINE

## 2022-01-01 PROCEDURE — 25010000002 FENTANYL CITRATE (PF) 50 MCG/ML SOLUTION: Performed by: NURSE ANESTHETIST, CERTIFIED REGISTERED

## 2022-01-01 PROCEDURE — 99232 SBSQ HOSP IP/OBS MODERATE 35: CPT | Performed by: FAMILY MEDICINE

## 2022-01-01 PROCEDURE — 82962 GLUCOSE BLOOD TEST: CPT

## 2022-01-01 PROCEDURE — 25010000002 ONDANSETRON PER 1 MG: Performed by: NURSE ANESTHETIST, CERTIFIED REGISTERED

## 2022-01-01 PROCEDURE — 25010000002 CEFTRIAXONE PER 250 MG: Performed by: FAMILY MEDICINE

## 2022-01-01 PROCEDURE — 82248 BILIRUBIN DIRECT: CPT | Performed by: FAMILY MEDICINE

## 2022-01-01 PROCEDURE — 63710000001 PREDNISONE PER 1 MG: Performed by: FAMILY MEDICINE

## 2022-01-01 PROCEDURE — 25010000002 MORPHINE PER 10 MG: Performed by: EMERGENCY MEDICINE

## 2022-01-01 PROCEDURE — 25010000002 EPINEPHRINE 1 MG/ML SOLUTION: Performed by: STUDENT IN AN ORGANIZED HEALTH CARE EDUCATION/TRAINING PROGRAM

## 2022-01-01 PROCEDURE — G8404 LOW EXTEMITY NEUR EXAM DOCUM: HCPCS | Performed by: PODIATRIST

## 2022-01-01 PROCEDURE — 85025 COMPLETE CBC W/AUTO DIFF WBC: CPT | Performed by: FAMILY MEDICINE

## 2022-01-01 PROCEDURE — 25010000002 PROPOFOL 10 MG/ML EMULSION: Performed by: NURSE ANESTHETIST, CERTIFIED REGISTERED

## 2022-01-01 PROCEDURE — 93005 ELECTROCARDIOGRAM TRACING: CPT | Performed by: EMERGENCY MEDICINE

## 2022-01-01 PROCEDURE — 93005 ELECTROCARDIOGRAM TRACING: CPT | Performed by: INTERNAL MEDICINE

## 2022-01-01 PROCEDURE — 82375 ASSAY CARBOXYHB QUANT: CPT | Performed by: INTERNAL MEDICINE

## 2022-01-01 PROCEDURE — 94002 VENT MGMT INPAT INIT DAY: CPT

## 2022-01-01 PROCEDURE — 99285 EMERGENCY DEPT VISIT HI MDM: CPT

## 2022-01-01 PROCEDURE — 25010000002 MORPHINE (PF) 10 MG/ML SOLUTION: Performed by: STUDENT IN AN ORGANIZED HEALTH CARE EDUCATION/TRAINING PROGRAM

## 2022-01-01 PROCEDURE — 80053 COMPREHEN METABOLIC PANEL: CPT | Performed by: EMERGENCY MEDICINE

## 2022-01-01 PROCEDURE — 86901 BLOOD TYPING SEROLOGIC RH(D): CPT | Performed by: EMERGENCY MEDICINE

## 2022-01-01 PROCEDURE — 1111F DSCHRG MED/CURRENT MED MERGE: CPT | Performed by: FAMILY MEDICINE

## 2022-01-01 PROCEDURE — 82805 BLOOD GASES W/O2 SATURATION: CPT | Performed by: INTERNAL MEDICINE

## 2022-01-01 PROCEDURE — 11721 DEBRIDE NAIL 6 OR MORE: CPT | Performed by: PODIATRIST

## 2022-01-01 PROCEDURE — 83735 ASSAY OF MAGNESIUM: CPT | Performed by: FAMILY MEDICINE

## 2022-01-01 PROCEDURE — 87070 CULTURE OTHR SPECIMN AEROBIC: CPT | Performed by: INTERNAL MEDICINE

## 2022-01-01 PROCEDURE — 86850 RBC ANTIBODY SCREEN: CPT | Performed by: EMERGENCY MEDICINE

## 2022-01-01 PROCEDURE — 25010000002 ROPIVACAINE PER 1 MG: Performed by: STUDENT IN AN ORGANIZED HEALTH CARE EDUCATION/TRAINING PROGRAM

## 2022-01-01 PROCEDURE — 0SR90J9 REPLACEMENT OF RIGHT HIP JOINT WITH SYNTHETIC SUBSTITUTE, CEMENTED, OPEN APPROACH: ICD-10-PCS | Performed by: STUDENT IN AN ORGANIZED HEALTH CARE EDUCATION/TRAINING PROGRAM

## 2022-01-01 PROCEDURE — 25010000002 FUROSEMIDE PER 20 MG: Performed by: FAMILY MEDICINE

## 2022-01-01 PROCEDURE — 25010000002 LORAZEPAM PER 2 MG: Performed by: GENERAL PRACTICE

## 2022-01-01 PROCEDURE — 83735 ASSAY OF MAGNESIUM: CPT | Performed by: GENERAL PRACTICE

## 2022-01-01 PROCEDURE — 85007 BL SMEAR W/DIFF WBC COUNT: CPT | Performed by: INTERNAL MEDICINE

## 2022-01-01 PROCEDURE — 87070 CULTURE OTHR SPECIMN AEROBIC: CPT | Performed by: PHYSICIAN ASSISTANT

## 2022-01-01 PROCEDURE — 82728 ASSAY OF FERRITIN: CPT | Performed by: FAMILY MEDICINE

## 2022-01-01 PROCEDURE — 99223 1ST HOSP IP/OBS HIGH 75: CPT | Performed by: GENERAL PRACTICE

## 2022-01-01 PROCEDURE — 99232 SBSQ HOSP IP/OBS MODERATE 35: CPT | Performed by: INTERNAL MEDICINE

## 2022-01-01 PROCEDURE — 25010000002 DEXAMETHASONE PER 1 MG: Performed by: NURSE ANESTHETIST, CERTIFIED REGISTERED

## 2022-01-01 PROCEDURE — 94640 AIRWAY INHALATION TREATMENT: CPT

## 2022-01-01 PROCEDURE — 92950 HEART/LUNG RESUSCITATION CPR: CPT

## 2022-01-01 PROCEDURE — 76000 FLUOROSCOPY <1 HR PHYS/QHP: CPT

## 2022-01-01 PROCEDURE — 27236 TREAT THIGH FRACTURE: CPT | Performed by: STUDENT IN AN ORGANIZED HEALTH CARE EDUCATION/TRAINING PROGRAM

## 2022-01-01 PROCEDURE — 83036 HEMOGLOBIN GLYCOSYLATED A1C: CPT | Performed by: GENERAL PRACTICE

## 2022-01-01 PROCEDURE — 85018 HEMOGLOBIN: CPT | Performed by: GENERAL PRACTICE

## 2022-01-01 PROCEDURE — 85610 PROTHROMBIN TIME: CPT | Performed by: GENERAL PRACTICE

## 2022-01-01 PROCEDURE — 85610 PROTHROMBIN TIME: CPT | Performed by: EMERGENCY MEDICINE

## 2022-01-01 PROCEDURE — 5A1935Z RESPIRATORY VENTILATION, LESS THAN 24 CONSECUTIVE HOURS: ICD-10-PCS | Performed by: ANESTHESIOLOGY

## 2022-01-01 PROCEDURE — 83735 ASSAY OF MAGNESIUM: CPT | Performed by: INTERNAL MEDICINE

## 2022-01-01 PROCEDURE — 86900 BLOOD TYPING SEROLOGIC ABO: CPT

## 2022-01-01 PROCEDURE — 73552 X-RAY EXAM OF FEMUR 2/>: CPT

## 2022-01-01 PROCEDURE — 71045 X-RAY EXAM CHEST 1 VIEW: CPT

## 2022-01-01 PROCEDURE — 84484 ASSAY OF TROPONIN QUANT: CPT | Performed by: EMERGENCY MEDICINE

## 2022-01-01 PROCEDURE — 82820 HEMOGLOBIN-OXYGEN AFFINITY: CPT | Performed by: EMERGENCY MEDICINE

## 2022-01-01 PROCEDURE — 85730 THROMBOPLASTIN TIME PARTIAL: CPT | Performed by: INTERNAL MEDICINE

## 2022-01-01 PROCEDURE — 85610 PROTHROMBIN TIME: CPT | Performed by: STUDENT IN AN ORGANIZED HEALTH CARE EDUCATION/TRAINING PROGRAM

## 2022-01-01 PROCEDURE — 83880 ASSAY OF NATRIURETIC PEPTIDE: CPT | Performed by: INTERNAL MEDICINE

## 2022-01-01 PROCEDURE — 99291 CRITICAL CARE FIRST HOUR: CPT | Performed by: INTERNAL MEDICINE

## 2022-01-01 PROCEDURE — 83605 ASSAY OF LACTIC ACID: CPT | Performed by: GENERAL PRACTICE

## 2022-01-01 PROCEDURE — 85379 FIBRIN DEGRADATION QUANT: CPT | Performed by: FAMILY MEDICINE

## 2022-01-01 PROCEDURE — P9047 ALBUMIN (HUMAN), 25%, 50ML: HCPCS | Performed by: PHYSICIAN ASSISTANT

## 2022-01-01 PROCEDURE — 99214 OFFICE O/P EST MOD 30 MIN: CPT | Performed by: NURSE PRACTITIONER

## 2022-01-01 PROCEDURE — 85025 COMPLETE CBC W/AUTO DIFF WBC: CPT | Performed by: INTERNAL MEDICINE

## 2022-01-01 PROCEDURE — 86140 C-REACTIVE PROTEIN: CPT | Performed by: FAMILY MEDICINE

## 2022-01-01 PROCEDURE — 85014 HEMATOCRIT: CPT | Performed by: GENERAL PRACTICE

## 2022-01-01 PROCEDURE — 99496 TRANSJ CARE MGMT HIGH F2F 7D: CPT | Performed by: FAMILY MEDICINE

## 2022-01-01 PROCEDURE — 82805 BLOOD GASES W/O2 SATURATION: CPT | Performed by: EMERGENCY MEDICINE

## 2022-01-01 PROCEDURE — 99239 HOSP IP/OBS DSCHRG MGMT >30: CPT | Performed by: FAMILY MEDICINE

## 2022-01-01 PROCEDURE — 25010000002 FENTANYL CITRATE (PF) 50 MCG/ML SOLUTION: Performed by: INTERNAL MEDICINE

## 2022-01-01 PROCEDURE — 92950 HEART/LUNG RESUSCITATION CPR: CPT | Performed by: INTERNAL MEDICINE

## 2022-01-01 PROCEDURE — 25010000002 IRON SUCROSE PER 1 MG: Performed by: INTERNAL MEDICINE

## 2022-01-01 PROCEDURE — 25010000002 FUROSEMIDE PER 20 MG: Performed by: INTERNAL MEDICINE

## 2022-01-01 PROCEDURE — 86901 BLOOD TYPING SEROLOGIC RH(D): CPT

## 2022-01-01 PROCEDURE — 02H633Z INSERTION OF INFUSION DEVICE INTO RIGHT ATRIUM, PERCUTANEOUS APPROACH: ICD-10-PCS | Performed by: INTERNAL MEDICINE

## 2022-01-01 PROCEDURE — 94003 VENT MGMT INPAT SUBQ DAY: CPT

## 2022-01-01 PROCEDURE — U0005 INFEC AGEN DETEC AMPLI PROBE: HCPCS | Performed by: STUDENT IN AN ORGANIZED HEALTH CARE EDUCATION/TRAINING PROGRAM

## 2022-01-01 PROCEDURE — 85730 THROMBOPLASTIN TIME PARTIAL: CPT | Performed by: EMERGENCY MEDICINE

## 2022-01-01 PROCEDURE — 83735 ASSAY OF MAGNESIUM: CPT | Performed by: EMERGENCY MEDICINE

## 2022-01-01 PROCEDURE — 25010000002 EPINEPHRINE 1 MG/10ML SOLUTION PREFILLED SYRINGE: Performed by: STUDENT IN AN ORGANIZED HEALTH CARE EDUCATION/TRAINING PROGRAM

## 2022-01-01 PROCEDURE — 85610 PROTHROMBIN TIME: CPT | Performed by: INTERNAL MEDICINE

## 2022-01-01 PROCEDURE — 87205 SMEAR GRAM STAIN: CPT | Performed by: INTERNAL MEDICINE

## 2022-01-01 PROCEDURE — 85025 COMPLETE CBC W/AUTO DIFF WBC: CPT | Performed by: EMERGENCY MEDICINE

## 2022-01-01 PROCEDURE — 84100 ASSAY OF PHOSPHORUS: CPT | Performed by: STUDENT IN AN ORGANIZED HEALTH CARE EDUCATION/TRAINING PROGRAM

## 2022-01-01 PROCEDURE — 85018 HEMOGLOBIN: CPT | Performed by: INTERNAL MEDICINE

## 2022-01-01 PROCEDURE — 80048 BASIC METABOLIC PNL TOTAL CA: CPT | Performed by: INTERNAL MEDICINE

## 2022-01-01 PROCEDURE — 99222 1ST HOSP IP/OBS MODERATE 55: CPT | Performed by: INTERNAL MEDICINE

## 2022-01-01 DEVICE — BONE PREPARATION KIT
Type: IMPLANTABLE DEVICE | Site: HIP | Status: FUNCTIONAL
Brand: BIOPREP

## 2022-01-01 DEVICE — CAP PRT HIP BIPOL: Type: IMPLANTABLE DEVICE | Site: HIP | Status: FUNCTIONAL

## 2022-01-01 DEVICE — AVENIR® STANDARD STEM CEMENTED 2
Type: IMPLANTABLE DEVICE | Site: HIP | Status: FUNCTIONAL
Brand: AVENIR®

## 2022-01-01 DEVICE — CUP ACET RINGLOC BIPOL 28MM 42MM: Type: IMPLANTABLE DEVICE | Site: HIP | Status: FUNCTIONAL

## 2022-01-01 DEVICE — HD FEM/HIP UNIV COCR 12/14TPR 28MM MIN3.5: Type: IMPLANTABLE DEVICE | Site: HIP | Status: FUNCTIONAL

## 2022-01-01 DEVICE — CMT BONE PALACOS R HI/VISC 1X40: Type: IMPLANTABLE DEVICE | Site: HIP | Status: FUNCTIONAL

## 2022-01-01 RX ORDER — MAGNESIUM HYDROXIDE 1200 MG/15ML
LIQUID ORAL AS NEEDED
Status: DISCONTINUED | OUTPATIENT
Start: 2022-01-01 | End: 2022-01-01 | Stop reason: HOSPADM

## 2022-01-01 RX ORDER — FUROSEMIDE 40 MG/1
40 TABLET ORAL DAILY
Status: DISCONTINUED | OUTPATIENT
Start: 2022-01-01 | End: 2022-01-01 | Stop reason: HOSPADM

## 2022-01-01 RX ORDER — CALCIUM CHLORIDE 100 MG/ML
INJECTION INTRAVENOUS; INTRAVENTRICULAR AS NEEDED
Status: DISCONTINUED | OUTPATIENT
Start: 2022-01-01 | End: 2022-01-01 | Stop reason: SURG

## 2022-01-01 RX ORDER — HYDROCODONE BITARTRATE AND ACETAMINOPHEN 5; 325 MG/1; MG/1
1 TABLET ORAL EVERY 4 HOURS PRN
Status: DISCONTINUED | OUTPATIENT
Start: 2022-01-01 | End: 2022-01-01 | Stop reason: HOSPADM

## 2022-01-01 RX ORDER — LORAZEPAM 2 MG/ML
2 INJECTION INTRAMUSCULAR
Status: DISCONTINUED | OUTPATIENT
Start: 2022-01-01 | End: 2022-01-01 | Stop reason: HOSPADM

## 2022-01-01 RX ORDER — LORAZEPAM 2 MG/ML
2 CONCENTRATE ORAL
Status: DISCONTINUED | OUTPATIENT
Start: 2022-01-01 | End: 2022-01-01 | Stop reason: HOSPADM

## 2022-01-01 RX ORDER — PROMETHAZINE HYDROCHLORIDE 25 MG/1
25 SUPPOSITORY RECTAL ONCE AS NEEDED
Status: DISCONTINUED | OUTPATIENT
Start: 2022-01-01 | End: 2022-01-01 | Stop reason: HOSPADM

## 2022-01-01 RX ORDER — DEXAMETHASONE SODIUM PHOSPHATE 4 MG/ML
INJECTION, SOLUTION INTRA-ARTICULAR; INTRALESIONAL; INTRAMUSCULAR; INTRAVENOUS; SOFT TISSUE AS NEEDED
Status: DISCONTINUED | OUTPATIENT
Start: 2022-01-01 | End: 2022-01-01 | Stop reason: SURG

## 2022-01-01 RX ORDER — MORPHINE SULFATE 2 MG/ML
2 INJECTION, SOLUTION INTRAMUSCULAR; INTRAVENOUS ONCE
Status: COMPLETED | OUTPATIENT
Start: 2022-01-01 | End: 2022-01-01

## 2022-01-01 RX ORDER — DEXTROSE MONOHYDRATE 25 G/50ML
INJECTION, SOLUTION INTRAVENOUS
Status: COMPLETED
Start: 2022-01-01 | End: 2022-01-01

## 2022-01-01 RX ORDER — FENTANYL CITRATE 50 UG/ML
25 INJECTION, SOLUTION INTRAMUSCULAR; INTRAVENOUS ONCE
Status: COMPLETED | OUTPATIENT
Start: 2022-01-01 | End: 2022-01-01

## 2022-01-01 RX ORDER — SODIUM CHLORIDE, SODIUM LACTATE, POTASSIUM CHLORIDE, CALCIUM CHLORIDE 600; 310; 30; 20 MG/100ML; MG/100ML; MG/100ML; MG/100ML
100 INJECTION, SOLUTION INTRAVENOUS CONTINUOUS
Status: DISCONTINUED | OUTPATIENT
Start: 2022-01-01 | End: 2022-01-01

## 2022-01-01 RX ORDER — CEFAZOLIN SODIUM 2 G/100ML
2 INJECTION, SOLUTION INTRAVENOUS EVERY 6 HOURS
Status: DISCONTINUED | OUTPATIENT
Start: 2022-01-01 | End: 2022-01-01

## 2022-01-01 RX ORDER — ONDANSETRON 2 MG/ML
4 INJECTION INTRAMUSCULAR; INTRAVENOUS ONCE
Status: COMPLETED | OUTPATIENT
Start: 2022-01-01 | End: 2022-01-01

## 2022-01-01 RX ORDER — LORAZEPAM 2 MG/ML
0.5 CONCENTRATE ORAL
Status: DISCONTINUED | OUTPATIENT
Start: 2022-01-01 | End: 2022-01-01 | Stop reason: HOSPADM

## 2022-01-01 RX ORDER — NITROGLYCERIN 0.4 MG/1
0.4 TABLET SUBLINGUAL
Status: CANCELLED | OUTPATIENT
Start: 2022-01-01

## 2022-01-01 RX ORDER — ALBUMIN (HUMAN) 12.5 G/50ML
12.5 SOLUTION INTRAVENOUS AS NEEDED
Status: CANCELLED | OUTPATIENT
Start: 2022-01-01 | End: 2022-02-27

## 2022-01-01 RX ORDER — FUROSEMIDE 10 MG/ML
40 INJECTION INTRAMUSCULAR; INTRAVENOUS DAILY
Status: DISCONTINUED | OUTPATIENT
Start: 2022-01-01 | End: 2022-01-01

## 2022-01-01 RX ORDER — MEPERIDINE HYDROCHLORIDE 25 MG/ML
12.5 INJECTION INTRAMUSCULAR; INTRAVENOUS; SUBCUTANEOUS
Status: DISCONTINUED | OUTPATIENT
Start: 2022-01-01 | End: 2022-01-01 | Stop reason: HOSPADM

## 2022-01-01 RX ORDER — OXYCODONE HYDROCHLORIDE 5 MG/1
5 TABLET ORAL
Status: DISCONTINUED | OUTPATIENT
Start: 2022-01-01 | End: 2022-01-01 | Stop reason: HOSPADM

## 2022-01-01 RX ORDER — METOLAZONE 2.5 MG/1
2.5 TABLET ORAL
COMMUNITY

## 2022-01-01 RX ORDER — FENTANYL CITRATE 50 UG/ML
INJECTION, SOLUTION INTRAMUSCULAR; INTRAVENOUS AS NEEDED
Status: DISCONTINUED | OUTPATIENT
Start: 2022-01-01 | End: 2022-01-01 | Stop reason: SURG

## 2022-01-01 RX ORDER — LORAZEPAM 2 MG/ML
1 CONCENTRATE ORAL
Status: DISCONTINUED | OUTPATIENT
Start: 2022-01-01 | End: 2022-01-01 | Stop reason: HOSPADM

## 2022-01-01 RX ORDER — ONDANSETRON 4 MG/1
4 TABLET, FILM COATED ORAL EVERY 6 HOURS PRN
Status: DISCONTINUED | OUTPATIENT
Start: 2022-01-01 | End: 2022-01-01 | Stop reason: HOSPADM

## 2022-01-01 RX ORDER — LORAZEPAM 2 MG/ML
1 INJECTION INTRAMUSCULAR
Status: DISCONTINUED | OUTPATIENT
Start: 2022-01-01 | End: 2022-01-01 | Stop reason: HOSPADM

## 2022-01-01 RX ORDER — POLYETHYLENE GLYCOL 3350 17 G/17G
17 POWDER, FOR SOLUTION ORAL DAILY PRN
Status: DISCONTINUED | OUTPATIENT
Start: 2022-01-01 | End: 2022-01-01

## 2022-01-01 RX ORDER — AMIODARONE HYDROCHLORIDE 50 MG/ML
INJECTION, SOLUTION INTRAVENOUS
Status: COMPLETED | OUTPATIENT
Start: 2022-01-01 | End: 2022-01-01

## 2022-01-01 RX ORDER — IPRATROPIUM BROMIDE AND ALBUTEROL SULFATE 2.5; .5 MG/3ML; MG/3ML
3 SOLUTION RESPIRATORY (INHALATION) ONCE
Status: COMPLETED | OUTPATIENT
Start: 2022-01-01 | End: 2022-01-01

## 2022-01-01 RX ORDER — DIPHENOXYLATE HYDROCHLORIDE AND ATROPINE SULFATE 2.5; .025 MG/1; MG/1
1 TABLET ORAL
Status: DISCONTINUED | OUTPATIENT
Start: 2022-01-01 | End: 2022-01-01 | Stop reason: HOSPADM

## 2022-01-01 RX ORDER — ONDANSETRON 2 MG/ML
INJECTION INTRAMUSCULAR; INTRAVENOUS AS NEEDED
Status: DISCONTINUED | OUTPATIENT
Start: 2022-01-01 | End: 2022-01-01 | Stop reason: SURG

## 2022-01-01 RX ORDER — LIDOCAINE HYDROCHLORIDE 20 MG/ML
INJECTION, SOLUTION INFILTRATION; PERINEURAL AS NEEDED
Status: DISCONTINUED | OUTPATIENT
Start: 2022-01-01 | End: 2022-01-01 | Stop reason: SURG

## 2022-01-01 RX ORDER — CALCIUM CHLORIDE 100 MG/ML
INJECTION INTRAVENOUS; INTRAVENTRICULAR
Status: COMPLETED | OUTPATIENT
Start: 2022-01-01 | End: 2022-01-01

## 2022-01-01 RX ORDER — SODIUM CHLORIDE, SODIUM LACTATE, POTASSIUM CHLORIDE, CALCIUM CHLORIDE 600; 310; 30; 20 MG/100ML; MG/100ML; MG/100ML; MG/100ML
INJECTION, SOLUTION INTRAVENOUS
Status: COMPLETED | OUTPATIENT
Start: 2022-01-01 | End: 2022-01-01

## 2022-01-01 RX ORDER — PROMETHAZINE HYDROCHLORIDE 12.5 MG/1
12.5 TABLET ORAL EVERY 6 HOURS PRN
Status: DISCONTINUED | OUTPATIENT
Start: 2022-01-01 | End: 2022-01-01 | Stop reason: HOSPADM

## 2022-01-01 RX ORDER — SODIUM CHLORIDE 0.9 % (FLUSH) 0.9 %
10 SYRINGE (ML) INJECTION AS NEEDED
Status: DISCONTINUED | OUTPATIENT
Start: 2022-01-01 | End: 2022-01-01 | Stop reason: HOSPADM

## 2022-01-01 RX ORDER — DEXTROSE MONOHYDRATE 100 MG/ML
25 INJECTION, SOLUTION INTRAVENOUS
Status: DISCONTINUED | OUTPATIENT
Start: 2022-01-01 | End: 2022-01-01 | Stop reason: HOSPADM

## 2022-01-01 RX ORDER — LORAZEPAM 2 MG/1
2 TABLET ORAL
Status: DISCONTINUED | OUTPATIENT
Start: 2022-01-01 | End: 2022-01-01 | Stop reason: HOSPADM

## 2022-01-01 RX ORDER — AMOXICILLIN 250 MG
2 CAPSULE ORAL 2 TIMES DAILY
Status: DISCONTINUED | OUTPATIENT
Start: 2022-01-01 | End: 2022-01-01

## 2022-01-01 RX ORDER — CEFDINIR 300 MG/1
300 CAPSULE ORAL DAILY
Qty: 5 CAPSULE | Refills: 0 | Status: SHIPPED | OUTPATIENT
Start: 2022-01-01 | End: 2022-01-01

## 2022-01-01 RX ORDER — LORAZEPAM 2 MG/ML
0.5 INJECTION INTRAMUSCULAR
Status: DISCONTINUED | OUTPATIENT
Start: 2022-01-01 | End: 2022-01-01 | Stop reason: HOSPADM

## 2022-01-01 RX ORDER — BISACODYL 10 MG
10 SUPPOSITORY, RECTAL RECTAL DAILY PRN
Status: DISCONTINUED | OUTPATIENT
Start: 2022-01-01 | End: 2022-01-01

## 2022-01-01 RX ORDER — ACETAMINOPHEN 325 MG/1
650 TABLET ORAL EVERY 4 HOURS PRN
Status: DISCONTINUED | OUTPATIENT
Start: 2022-01-01 | End: 2022-01-01 | Stop reason: HOSPADM

## 2022-01-01 RX ORDER — CARVEDILOL 3.12 MG/1
3.12 TABLET ORAL 2 TIMES DAILY WITH MEALS
Status: DISCONTINUED | OUTPATIENT
Start: 2022-01-01 | End: 2022-01-01

## 2022-01-01 RX ORDER — LOSARTAN POTASSIUM 25 MG/1
25 TABLET ORAL DAILY
Status: DISCONTINUED | OUTPATIENT
Start: 2022-01-01 | End: 2022-01-01

## 2022-01-01 RX ORDER — CEFAZOLIN SODIUM 2 G/100ML
2 INJECTION, SOLUTION INTRAVENOUS
Status: COMPLETED | OUTPATIENT
Start: 2022-01-01 | End: 2022-01-01

## 2022-01-01 RX ORDER — METOLAZONE 2.5 MG/1
2.5 TABLET ORAL DAILY
Status: DISCONTINUED | OUTPATIENT
Start: 2022-01-01 | End: 2022-01-01

## 2022-01-01 RX ORDER — FERROUS SULFATE 325(65) MG
325 TABLET ORAL
Status: DISCONTINUED | OUTPATIENT
Start: 2022-01-01 | End: 2022-01-01

## 2022-01-01 RX ORDER — ALBUMIN (HUMAN) 12.5 G/50ML
25 SOLUTION INTRAVENOUS ONCE
Status: COMPLETED | OUTPATIENT
Start: 2022-01-01 | End: 2022-01-01

## 2022-01-01 RX ORDER — SODIUM CHLORIDE 0.9 % (FLUSH) 0.9 %
10 SYRINGE (ML) INJECTION EVERY 12 HOURS SCHEDULED
Status: DISCONTINUED | OUTPATIENT
Start: 2022-01-01 | End: 2022-01-01 | Stop reason: HOSPADM

## 2022-01-01 RX ORDER — ACETAMINOPHEN 160 MG/5ML
650 SOLUTION ORAL EVERY 4 HOURS PRN
Status: DISCONTINUED | OUTPATIENT
Start: 2022-01-01 | End: 2022-01-01 | Stop reason: HOSPADM

## 2022-01-01 RX ORDER — DIPHENHYDRAMINE HYDROCHLORIDE 50 MG/ML
25 INJECTION INTRAMUSCULAR; INTRAVENOUS EVERY 6 HOURS PRN
Status: CANCELLED | OUTPATIENT
Start: 2022-01-01

## 2022-01-01 RX ORDER — LORAZEPAM 0.5 MG/1
0.5 TABLET ORAL
Status: DISCONTINUED | OUTPATIENT
Start: 2022-01-01 | End: 2022-01-01 | Stop reason: HOSPADM

## 2022-01-01 RX ORDER — ESOMEPRAZOLE MAGNESIUM 40 MG/1
40 CAPSULE, DELAYED RELEASE ORAL
Qty: 90 CAPSULE | Refills: 3 | Status: SHIPPED | OUTPATIENT
Start: 2022-01-01 | End: 2022-01-01

## 2022-01-01 RX ORDER — PROMETHAZINE HYDROCHLORIDE 12.5 MG/1
25 TABLET ORAL ONCE AS NEEDED
Status: DISCONTINUED | OUTPATIENT
Start: 2022-01-01 | End: 2022-01-01 | Stop reason: HOSPADM

## 2022-01-01 RX ORDER — PANTOPRAZOLE SODIUM 40 MG/1
40 TABLET, DELAYED RELEASE ORAL 2 TIMES DAILY
Status: DISCONTINUED | OUTPATIENT
Start: 2022-01-01 | End: 2022-01-01

## 2022-01-01 RX ORDER — DEXTROSE MONOHYDRATE 25 G/50ML
INJECTION, SOLUTION INTRAVENOUS
Status: DISCONTINUED
Start: 2022-01-01 | End: 2022-01-01 | Stop reason: HOSPADM

## 2022-01-01 RX ORDER — SODIUM CHLORIDE 9 MG/ML
INJECTION, SOLUTION INTRAVENOUS CONTINUOUS PRN
Status: DISCONTINUED | OUTPATIENT
Start: 2022-01-01 | End: 2022-01-01 | Stop reason: SURG

## 2022-01-01 RX ORDER — PREDNISONE 20 MG/1
40 TABLET ORAL
Qty: 6 TABLET | Refills: 0 | Status: SHIPPED | OUTPATIENT
Start: 2022-01-01 | End: 2022-01-01

## 2022-01-01 RX ORDER — ACETAMINOPHEN 650 MG/1
650 SUPPOSITORY RECTAL EVERY 4 HOURS PRN
Status: DISCONTINUED | OUTPATIENT
Start: 2022-01-01 | End: 2022-01-01 | Stop reason: HOSPADM

## 2022-01-01 RX ORDER — PANTOPRAZOLE SODIUM 40 MG/10ML
40 INJECTION, POWDER, LYOPHILIZED, FOR SOLUTION INTRAVENOUS
Status: DISCONTINUED | OUTPATIENT
Start: 2022-01-01 | End: 2022-01-01

## 2022-01-01 RX ORDER — HEPARIN SODIUM 5000 [USP'U]/ML
5000 INJECTION, SOLUTION INTRAVENOUS; SUBCUTANEOUS EVERY 8 HOURS SCHEDULED
Status: DISCONTINUED | OUTPATIENT
Start: 2022-01-01 | End: 2022-01-01

## 2022-01-01 RX ORDER — PANTOPRAZOLE SODIUM 40 MG/1
40 TABLET, DELAYED RELEASE ORAL 2 TIMES DAILY
COMMUNITY
Start: 2022-01-01

## 2022-01-01 RX ORDER — ROCURONIUM BROMIDE 10 MG/ML
INJECTION, SOLUTION INTRAVENOUS AS NEEDED
Status: DISCONTINUED | OUTPATIENT
Start: 2022-01-01 | End: 2022-01-01 | Stop reason: SURG

## 2022-01-01 RX ORDER — ACETAMINOPHEN 325 MG/1
325 TABLET ORAL EVERY 4 HOURS PRN
Status: DISCONTINUED | OUTPATIENT
Start: 2022-01-01 | End: 2022-01-01 | Stop reason: HOSPADM

## 2022-01-01 RX ORDER — MORPHINE SULFATE 2 MG/ML
2 INJECTION, SOLUTION INTRAMUSCULAR; INTRAVENOUS
Status: DISCONTINUED | OUTPATIENT
Start: 2022-01-01 | End: 2022-01-01 | Stop reason: HOSPADM

## 2022-01-01 RX ORDER — HYDROCODONE BITARTRATE AND ACETAMINOPHEN 5; 325 MG/1; MG/1
2 TABLET ORAL EVERY 4 HOURS PRN
Status: DISCONTINUED | OUTPATIENT
Start: 2022-01-01 | End: 2022-01-01 | Stop reason: HOSPADM

## 2022-01-01 RX ORDER — NOREPINEPHRINE BIT/0.9 % NACL 8 MG/250ML
INFUSION BOTTLE (ML) INTRAVENOUS
Status: DISCONTINUED | OUTPATIENT
Start: 2022-01-01 | End: 2022-01-01

## 2022-01-01 RX ORDER — PROMETHAZINE HYDROCHLORIDE 12.5 MG/1
12.5 SUPPOSITORY RECTAL EVERY 6 HOURS PRN
Status: DISCONTINUED | OUTPATIENT
Start: 2022-01-01 | End: 2022-01-01 | Stop reason: HOSPADM

## 2022-01-01 RX ORDER — CALCITRIOL 0.25 UG/1
0.25 CAPSULE, LIQUID FILLED ORAL DAILY
COMMUNITY
Start: 2022-01-01

## 2022-01-01 RX ORDER — ONDANSETRON 2 MG/ML
4 INJECTION INTRAMUSCULAR; INTRAVENOUS ONCE AS NEEDED
Status: DISCONTINUED | OUTPATIENT
Start: 2022-01-01 | End: 2022-01-01 | Stop reason: HOSPADM

## 2022-01-01 RX ORDER — SIMETHICONE 80 MG
80 TABLET,CHEWABLE ORAL 4 TIMES DAILY PRN
Status: DISCONTINUED | OUTPATIENT
Start: 2022-01-01 | End: 2022-01-01 | Stop reason: HOSPADM

## 2022-01-01 RX ORDER — BISACODYL 5 MG/1
5 TABLET, DELAYED RELEASE ORAL DAILY PRN
Status: DISCONTINUED | OUTPATIENT
Start: 2022-01-01 | End: 2022-01-01

## 2022-01-01 RX ORDER — NEPHROCAP 1 MG
1 CAP ORAL DAILY
COMMUNITY
Start: 2022-01-01

## 2022-01-01 RX ORDER — PROPOFOL 10 MG/ML
VIAL (ML) INTRAVENOUS AS NEEDED
Status: DISCONTINUED | OUTPATIENT
Start: 2022-01-01 | End: 2022-01-01 | Stop reason: SURG

## 2022-01-01 RX ORDER — EPINEPHRINE 0.1 MG/ML
SYRINGE (ML) INJECTION
Status: COMPLETED | OUTPATIENT
Start: 2022-01-01 | End: 2022-01-01

## 2022-01-01 RX ORDER — LORAZEPAM 0.5 MG/1
1 TABLET ORAL
Status: DISCONTINUED | OUTPATIENT
Start: 2022-01-01 | End: 2022-01-01 | Stop reason: HOSPADM

## 2022-01-01 RX ORDER — PHENYLEPHRINE HCL IN 0.9% NACL 1 MG/10 ML
SYRINGE (ML) INTRAVENOUS AS NEEDED
Status: DISCONTINUED | OUTPATIENT
Start: 2022-01-01 | End: 2022-01-01 | Stop reason: SURG

## 2022-01-01 RX ORDER — PREDNISONE 20 MG/1
40 TABLET ORAL
Status: DISCONTINUED | OUTPATIENT
Start: 2022-01-01 | End: 2022-01-01 | Stop reason: HOSPADM

## 2022-01-01 RX ORDER — FAMOTIDINE 10 MG/ML
20 INJECTION, SOLUTION INTRAVENOUS DAILY
Status: DISCONTINUED | OUTPATIENT
Start: 2022-01-01 | End: 2022-01-01

## 2022-01-01 RX ORDER — FUROSEMIDE 20 MG/1
20 TABLET ORAL DAILY
Qty: 30 TABLET | Refills: 0 | Status: SHIPPED | OUTPATIENT
Start: 2022-01-01 | End: 2022-01-01

## 2022-01-01 RX ORDER — FUROSEMIDE 40 MG/1
TABLET ORAL
Qty: 180 TABLET | Refills: 1 | OUTPATIENT
Start: 2022-01-01

## 2022-01-01 RX ORDER — CARVEDILOL 6.25 MG/1
6.25 TABLET ORAL 2 TIMES DAILY WITH MEALS
Status: DISCONTINUED | OUTPATIENT
Start: 2022-01-01 | End: 2022-01-01

## 2022-01-01 RX ADMIN — MORPHINE SULFATE 2 MG: 2 INJECTION, SOLUTION INTRAMUSCULAR; INTRAVENOUS at 21:43

## 2022-01-01 RX ADMIN — Medication 200 MCG: at 14:30

## 2022-01-01 RX ADMIN — PREDNISONE 40 MG: 20 TABLET ORAL at 08:02

## 2022-01-01 RX ADMIN — SODIUM CHLORIDE 500 ML: 9 INJECTION, SOLUTION INTRAVENOUS at 00:15

## 2022-01-01 RX ADMIN — FUROSEMIDE 60 MG: 10 INJECTION INTRAMUSCULAR; INTRAVENOUS at 03:57

## 2022-01-01 RX ADMIN — CEFTRIAXONE SODIUM 1 G: 1 INJECTION, SOLUTION INTRAVENOUS at 09:15

## 2022-01-01 RX ADMIN — IPRATROPIUM BROMIDE AND ALBUTEROL SULFATE 3 ML: 2.5; .5 SOLUTION RESPIRATORY (INHALATION) at 00:08

## 2022-01-01 RX ADMIN — AZITHROMYCIN DIHYDRATE 500 MG: 500 INJECTION, POWDER, LYOPHILIZED, FOR SOLUTION INTRAVENOUS at 16:22

## 2022-01-01 RX ADMIN — Medication 10 ML: at 20:53

## 2022-01-01 RX ADMIN — PROPOFOL 80 MG: 10 INJECTION, EMULSION INTRAVENOUS at 12:46

## 2022-01-01 RX ADMIN — ARFORMOTEROL TARTRATE 15 MCG: 15 SOLUTION RESPIRATORY (INHALATION) at 18:26

## 2022-01-01 RX ADMIN — BUDESONIDE 0.5 MG: 0.5 SUSPENSION RESPIRATORY (INHALATION) at 06:39

## 2022-01-01 RX ADMIN — FENTANYL CITRATE 25 MCG: 50 INJECTION INTRAMUSCULAR; INTRAVENOUS at 21:16

## 2022-01-01 RX ADMIN — IPRATROPIUM BROMIDE AND ALBUTEROL SULFATE 3 ML: 2.5; .5 SOLUTION RESPIRATORY (INHALATION) at 08:20

## 2022-01-01 RX ADMIN — SODIUM BICARBONATE 100 MEQ: 84 INJECTION INTRAVENOUS at 20:52

## 2022-01-01 RX ADMIN — CARVEDILOL 6.25 MG: 6.25 TABLET, FILM COATED ORAL at 08:02

## 2022-01-01 RX ADMIN — ARFORMOTEROL TARTRATE 15 MCG: 15 SOLUTION RESPIRATORY (INHALATION) at 06:39

## 2022-01-01 RX ADMIN — VASOPRESSIN 0.03 UNITS/MIN: 20 INJECTION INTRAVENOUS at 20:59

## 2022-01-01 RX ADMIN — METHYLPREDNISOLONE SODIUM SUCCINATE 40 MG: 40 INJECTION, POWDER, FOR SOLUTION INTRAMUSCULAR; INTRAVENOUS at 01:17

## 2022-01-01 RX ADMIN — INSULIN LISPRO 3 UNITS: 100 INJECTION, SOLUTION INTRAVENOUS; SUBCUTANEOUS at 08:01

## 2022-01-01 RX ADMIN — AMIODARONE HYDROCHLORIDE 150 MG: 50 INJECTION, SOLUTION INTRAVENOUS at 15:12

## 2022-01-01 RX ADMIN — Medication 0.5 MCG/KG/MIN: at 15:45

## 2022-01-01 RX ADMIN — PANTOPRAZOLE SODIUM 40 MG: 40 INJECTION, POWDER, LYOPHILIZED, FOR SOLUTION INTRAVENOUS at 05:22

## 2022-01-01 RX ADMIN — FUROSEMIDE 40 MG: 10 INJECTION, SOLUTION INTRAMUSCULAR; INTRAVENOUS at 09:08

## 2022-01-01 RX ADMIN — ASPIRIN 81 MG: 81 TABLET, COATED ORAL at 08:02

## 2022-01-01 RX ADMIN — ALBUMIN HUMAN 25 G: 0.25 SOLUTION INTRAVENOUS at 05:58

## 2022-01-01 RX ADMIN — SENNOSIDES AND DOCUSATE SODIUM 2 TABLET: 50; 8.6 TABLET ORAL at 20:16

## 2022-01-01 RX ADMIN — PANTOPRAZOLE SODIUM 40 MG: 40 TABLET, DELAYED RELEASE ORAL at 08:55

## 2022-01-01 RX ADMIN — MORPHINE SULFATE 2 MG: 2 INJECTION, SOLUTION INTRAMUSCULAR; INTRAVENOUS at 06:17

## 2022-01-01 RX ADMIN — EPINEPHRINE 0.5 MG: 0.1 INJECTION INTRACARDIAC; INTRAVENOUS at 14:58

## 2022-01-01 RX ADMIN — SODIUM BICARBONATE 150 MEQ: 84 INJECTION, SOLUTION INTRAVENOUS at 04:27

## 2022-01-01 RX ADMIN — CEFAZOLIN SODIUM 2 G: 2 INJECTION, SOLUTION INTRAVENOUS at 05:23

## 2022-01-01 RX ADMIN — Medication 1.3 MCG/KG/MIN: at 21:17

## 2022-01-01 RX ADMIN — Medication 0.6 MCG/KG/MIN: at 19:31

## 2022-01-01 RX ADMIN — SODIUM CHLORIDE, POTASSIUM CHLORIDE, SODIUM LACTATE AND CALCIUM CHLORIDE 1 L: 600; 310; 30; 20 INJECTION, SOLUTION INTRAVENOUS at 15:00

## 2022-01-01 RX ADMIN — SODIUM BICARBONATE 50 MEQ: 84 INJECTION INTRAVENOUS at 15:21

## 2022-01-01 RX ADMIN — CARVEDILOL 6.25 MG: 6.25 TABLET, FILM COATED ORAL at 08:21

## 2022-01-01 RX ADMIN — DEXTROSE MONOHYDRATE 50 ML: 25 INJECTION, SOLUTION INTRAVENOUS at 04:57

## 2022-01-01 RX ADMIN — LIDOCAINE HYDROCHLORIDE 20 MG: 20 INJECTION, SOLUTION INFILTRATION; PERINEURAL at 13:16

## 2022-01-01 RX ADMIN — IRON SUCROSE 200 MG: 20 INJECTION, SOLUTION INTRAVENOUS at 11:26

## 2022-01-01 RX ADMIN — ASPIRIN 81 MG: 81 TABLET, COATED ORAL at 08:36

## 2022-01-01 RX ADMIN — Medication 10 ML: at 20:59

## 2022-01-01 RX ADMIN — BUDESONIDE 0.5 MG: 0.5 SUSPENSION RESPIRATORY (INHALATION) at 18:25

## 2022-01-01 RX ADMIN — IPRATROPIUM BROMIDE AND ALBUTEROL SULFATE 3 ML: 2.5; .5 SOLUTION RESPIRATORY (INHALATION) at 19:39

## 2022-01-01 RX ADMIN — IPRATROPIUM BROMIDE AND ALBUTEROL SULFATE 3 ML: 2.5; .5 SOLUTION RESPIRATORY (INHALATION) at 18:25

## 2022-01-01 RX ADMIN — INSULIN LISPRO 2 UNITS: 100 INJECTION, SOLUTION INTRAVENOUS; SUBCUTANEOUS at 07:42

## 2022-01-01 RX ADMIN — BUDESONIDE 0.5 MG: 0.5 SUSPENSION RESPIRATORY (INHALATION) at 08:00

## 2022-01-01 RX ADMIN — PREDNISONE 40 MG: 20 TABLET ORAL at 08:20

## 2022-01-01 RX ADMIN — Medication 10 ML: at 01:15

## 2022-01-01 RX ADMIN — Medication 200 MCG: at 14:15

## 2022-01-01 RX ADMIN — Medication 200 MCG: at 14:02

## 2022-01-01 RX ADMIN — CEFAZOLIN SODIUM 2 G: 2 INJECTION, SOLUTION INTRAVENOUS at 23:38

## 2022-01-01 RX ADMIN — BUDESONIDE 0.5 MG: 0.5 SUSPENSION RESPIRATORY (INHALATION) at 19:39

## 2022-01-01 RX ADMIN — IPRATROPIUM BROMIDE AND ALBUTEROL SULFATE 3 ML: 2.5; .5 SOLUTION RESPIRATORY (INHALATION) at 08:00

## 2022-01-01 RX ADMIN — ARFORMOTEROL TARTRATE 15 MCG: 15 SOLUTION RESPIRATORY (INHALATION) at 08:00

## 2022-01-01 RX ADMIN — Medication 200 MCG: at 14:18

## 2022-01-01 RX ADMIN — SODIUM CHLORIDE, PRESERVATIVE FREE 10 ML: 5 INJECTION INTRAVENOUS at 20:17

## 2022-01-01 RX ADMIN — Medication 2.5 MCG/KG/MIN: at 23:09

## 2022-01-01 RX ADMIN — INSULIN LISPRO 3 UNITS: 100 INJECTION, SOLUTION INTRAVENOUS; SUBCUTANEOUS at 16:41

## 2022-01-01 RX ADMIN — Medication 1.8 MCG/KG/MIN: at 01:29

## 2022-01-01 RX ADMIN — Medication 200 MCG: at 12:55

## 2022-01-01 RX ADMIN — SODIUM CHLORIDE, PRESERVATIVE FREE 10 ML: 5 INJECTION INTRAVENOUS at 08:21

## 2022-01-01 RX ADMIN — INSULIN LISPRO 3 UNITS: 100 INJECTION, SOLUTION INTRAVENOUS; SUBCUTANEOUS at 11:36

## 2022-01-01 RX ADMIN — INSULIN LISPRO 3 UNITS: 100 INJECTION, SOLUTION INTRAVENOUS; SUBCUTANEOUS at 17:12

## 2022-01-01 RX ADMIN — IPRATROPIUM BROMIDE AND ALBUTEROL SULFATE 3 ML: 2.5; .5 SOLUTION RESPIRATORY (INHALATION) at 06:39

## 2022-01-01 RX ADMIN — Medication 200 MCG: at 13:05

## 2022-01-01 RX ADMIN — Medication 200 MCG: at 13:13

## 2022-01-01 RX ADMIN — Medication 200 MCG: at 12:46

## 2022-01-01 RX ADMIN — EPINEPHRINE 1 MG: 0.1 INJECTION INTRACARDIAC; INTRAVENOUS at 15:22

## 2022-01-01 RX ADMIN — SENNOSIDES AND DOCUSATE SODIUM 2 TABLET: 50; 8.6 TABLET ORAL at 08:21

## 2022-01-01 RX ADMIN — EPINEPHRINE 0.5 MG: 0.1 INJECTION INTRACARDIAC; INTRAVENOUS at 15:00

## 2022-01-01 RX ADMIN — IPRATROPIUM BROMIDE AND ALBUTEROL SULFATE 3 ML: 2.5; .5 SOLUTION RESPIRATORY (INHALATION) at 21:52

## 2022-01-01 RX ADMIN — Medication 200 MCG: at 14:40

## 2022-01-01 RX ADMIN — SODIUM CHLORIDE, PRESERVATIVE FREE 10 ML: 5 INJECTION INTRAVENOUS at 09:15

## 2022-01-01 RX ADMIN — SODIUM BICARBONATE 100 MEQ: 84 INJECTION INTRAVENOUS at 23:44

## 2022-01-01 RX ADMIN — FUROSEMIDE 40 MG: 40 TABLET ORAL at 08:21

## 2022-01-01 RX ADMIN — ONDANSETRON 4 MG: 2 INJECTION INTRAMUSCULAR; INTRAVENOUS at 12:50

## 2022-01-01 RX ADMIN — FENTANYL CITRATE 25 MCG: 50 INJECTION, SOLUTION INTRAMUSCULAR; INTRAVENOUS at 13:17

## 2022-01-01 RX ADMIN — CALCIUM CHLORIDE 1 G: 100 INJECTION INTRAVENOUS; INTRAVENTRICULAR at 15:21

## 2022-01-01 RX ADMIN — CARVEDILOL 6.25 MG: 6.25 TABLET, FILM COATED ORAL at 08:36

## 2022-01-01 RX ADMIN — INSULIN LISPRO 3 UNITS: 100 INJECTION, SOLUTION INTRAVENOUS; SUBCUTANEOUS at 11:35

## 2022-01-01 RX ADMIN — CEFTRIAXONE SODIUM 1 G: 1 INJECTION, SOLUTION INTRAVENOUS at 08:21

## 2022-01-01 RX ADMIN — SODIUM BICARBONATE 100 MEQ: 84 INJECTION INTRAVENOUS at 04:28

## 2022-01-01 RX ADMIN — CEFAZOLIN SODIUM 2 G: 2 INJECTION, SOLUTION INTRAVENOUS at 12:27

## 2022-01-01 RX ADMIN — DEXAMETHASONE SODIUM PHOSPHATE 4 MG: 4 INJECTION INTRA-ARTICULAR; INTRALESIONAL; INTRAMUSCULAR; INTRAVENOUS; SOFT TISSUE at 12:50

## 2022-01-01 RX ADMIN — Medication 3 MCG/KG/MIN: at 04:59

## 2022-01-01 RX ADMIN — SODIUM CHLORIDE: 9 INJECTION, SOLUTION INTRAVENOUS at 12:43

## 2022-01-01 RX ADMIN — METHYLPREDNISOLONE SODIUM SUCCINATE 40 MG: 40 INJECTION, POWDER, FOR SOLUTION INTRAMUSCULAR; INTRAVENOUS at 12:48

## 2022-01-01 RX ADMIN — ARFORMOTEROL TARTRATE 15 MCG: 15 SOLUTION RESPIRATORY (INHALATION) at 19:39

## 2022-01-01 RX ADMIN — BENZONATATE 100 MG: 100 CAPSULE ORAL at 03:54

## 2022-01-01 RX ADMIN — FENTANYL CITRATE 25 MCG: 50 INJECTION, SOLUTION INTRAMUSCULAR; INTRAVENOUS at 12:46

## 2022-01-01 RX ADMIN — IPRATROPIUM BROMIDE AND ALBUTEROL SULFATE 3 ML: 2.5; .5 SOLUTION RESPIRATORY (INHALATION) at 13:25

## 2022-01-01 RX ADMIN — EPINEPHRINE 1 MG: 0.1 INJECTION INTRACARDIAC; INTRAVENOUS at 15:36

## 2022-01-01 RX ADMIN — CALCIUM CHLORIDE 0.5 G: 100 INJECTION INTRAVENOUS; INTRAVENTRICULAR at 12:50

## 2022-01-01 RX ADMIN — IPRATROPIUM BROMIDE AND ALBUTEROL SULFATE 3 ML: 2.5; .5 SOLUTION RESPIRATORY (INHALATION) at 00:10

## 2022-01-01 RX ADMIN — ONDANSETRON 4 MG: 2 INJECTION INTRAMUSCULAR; INTRAVENOUS at 21:42

## 2022-01-01 RX ADMIN — SODIUM CHLORIDE, POTASSIUM CHLORIDE, SODIUM LACTATE AND CALCIUM CHLORIDE 100 ML/HR: 600; 310; 30; 20 INJECTION, SOLUTION INTRAVENOUS at 19:30

## 2022-01-01 RX ADMIN — IPRATROPIUM BROMIDE AND ALBUTEROL SULFATE 3 ML: 2.5; .5 SOLUTION RESPIRATORY (INHALATION) at 12:44

## 2022-01-01 RX ADMIN — CARVEDILOL 6.25 MG: 6.25 TABLET, FILM COATED ORAL at 18:04

## 2022-01-01 RX ADMIN — SODIUM CHLORIDE, PRESERVATIVE FREE 10 ML: 5 INJECTION INTRAVENOUS at 09:08

## 2022-01-01 RX ADMIN — SODIUM CHLORIDE, PRESERVATIVE FREE 10 ML: 5 INJECTION INTRAVENOUS at 19:52

## 2022-01-01 RX ADMIN — Medication 200 MCG: at 14:24

## 2022-01-01 RX ADMIN — Medication 10 ML: at 09:03

## 2022-01-01 RX ADMIN — ARFORMOTEROL TARTRATE 15 MCG: 15 SOLUTION RESPIRATORY (INHALATION) at 08:20

## 2022-01-01 RX ADMIN — EPINEPHRINE 1 MG: 0.1 INJECTION INTRACARDIAC; INTRAVENOUS at 15:05

## 2022-01-01 RX ADMIN — CEFTRIAXONE SODIUM 1 G: 1 INJECTION, SOLUTION INTRAVENOUS at 09:08

## 2022-01-01 RX ADMIN — LORAZEPAM 1 MG: 2 INJECTION INTRAMUSCULAR; INTRAVENOUS at 06:17

## 2022-01-01 RX ADMIN — IPRATROPIUM BROMIDE AND ALBUTEROL SULFATE 3 ML: 2.5; .5 SOLUTION RESPIRATORY (INHALATION) at 12:52

## 2022-01-01 RX ADMIN — ROCURONIUM BROMIDE 30 MG: 10 INJECTION INTRAVENOUS at 12:46

## 2022-01-01 RX ADMIN — BUDESONIDE 0.5 MG: 0.5 SUSPENSION RESPIRATORY (INHALATION) at 08:20

## 2022-01-01 RX ADMIN — IPRATROPIUM BROMIDE AND ALBUTEROL SULFATE 3 ML: 2.5; .5 SOLUTION RESPIRATORY (INHALATION) at 01:06

## 2022-01-01 RX ADMIN — SIMETHICONE 80 MG: 80 TABLET, CHEWABLE ORAL at 09:15

## 2022-01-01 RX ADMIN — ASPIRIN 81 MG: 81 TABLET, COATED ORAL at 08:20

## 2022-01-01 RX ADMIN — CALCIUM CHLORIDE 0.5 G: 100 INJECTION INTRAVENOUS; INTRAVENTRICULAR at 13:13

## 2022-01-01 RX ADMIN — DEXTROSE MONOHYDRATE 50 ML: 25 INJECTION, SOLUTION INTRAVENOUS at 01:37

## 2022-01-01 RX ADMIN — FAMOTIDINE 20 MG: 10 INJECTION INTRAVENOUS at 18:12

## 2022-01-01 RX ADMIN — INSULIN LISPRO 2 UNITS: 100 INJECTION, SOLUTION INTRAVENOUS; SUBCUTANEOUS at 12:17

## 2022-01-01 RX ADMIN — CARVEDILOL 6.25 MG: 6.25 TABLET, FILM COATED ORAL at 17:11

## 2022-01-01 RX ADMIN — LIDOCAINE HYDROCHLORIDE 80 MG: 20 INJECTION, SOLUTION INFILTRATION; PERINEURAL at 12:46

## 2022-01-01 NOTE — PROGRESS NOTES
[facility]   Hospitalist Progress Note  Date: 2022  Patient Name: Carrington Ledbetter  : 1925  MRN: [id number]  Date of admission: 2021      Subjective   Subjective     Chief complaint: Shortness of breath  Summary:  89-year-old female hospitalized on 2021 with chief complaint of shortness of breath, chest discomfort and cough.  Acute on chronic hypoxemic respiratory failure secondary to acute decompensation of chronic systolic heart failure, with concern for community-acquired pneumonia, cardiology and nephrology consulted with underlying CKD stage IV, slightly elevated troponin related to her renal function, unlikely NSTEMI.  Does have history of left bundle branch block which is old.  Diuresis ongoing with IV Lasix.  She tested positive for strep pneumo antigen, likely etiology of her pneumonia     Interval follow-up: Patient seen and examined this morning, no acute distress, no acute major overnight events sitting in bedside chair, notes lower extreme edema has improved, as has her breathing, she has less cough and less shortness of breath, less increased work of breathing, she is down to her baseline oxygen at 2 L, she still continues to have exertional dyspnea, but more concerning is she has had runs of V. tach on the telemetry monitor several episodes when reviewing, she denies any chest pain or palpitations with these episodes.  Baseline sinus rhythm in the 80s.  She has belching and gas pain.  Blood sugars are stable.  Creatinine at 2.08, potassium of 4.8, hemoglobin at 10.9.  Sputum culture shows WBCs, with ongoing diuresis her fluid balance is net -300 mL.  Echo reviewed EF 36 to 40%.    Review of systems:  All systems reviewed and negative except for cough, shortness of breath with exertion, fatigue, lower extremity edema improving.  Abdominal bloating and belching.    Objective   Objective     Vitals:   Temp:  [97.4 °F (36.3 °C)-97.9 °F (36.6 °C)] 97.5 °F (36.4 °C)  Heart Rate:   [] 68  Resp:  [16-20] 18  BP: (101-140)/(45-80) 101/45  Flow (L/min):  [1-2] 2  Physical Exam    Constitutional: Awake, alert, no acute distress, sitting in bedside chair, breathing with nasal cannula oxygen at 2 L              Eyes: Pupils equal, sclerae anicteric, no conjunctival injection              HENT: NCAT, mucous membranes moist              Neck: Supple, full range of motion              Respiratory: Air entry bilaterally, coarse breath sounds throughout, rhonchi, fine crackles at the bases              Cardiovascular: Paced rhythm, 70 bpm, 2 out of 6 systolic murmurs, palpable pedal pulses bilaterally              Gastrointestinal: Positive bowel sounds, soft, nontender, nondistended              Musculoskeletal: Trace to +1 pitting bilateral ankle edema, no clubbing or cyanosis to extremities              Psychiatric: Appropriate affect, cooperative              Neurologic: Oriented x 3, strength symmetric in all extremities, Cranial Nerves grossly intact to confrontation, speech clear              Skin: No rashes       Result Review    Result Review:  I have personally reviewed the results from the time of this admission to 1/1/2022 14:25 EST and agree with these findings:  [x]  Laboratory   CBC    CBC 12/30/21 12/31/21 1/1/22   WBC 8.68 6.56 7.10   RBC 3.92 3.69 (A) 4.03   Hemoglobin 10.9 (A) 10.1 (A) 10.9 (A)   Hematocrit 34.5 32.2 (A) 35.8   MCV 88.0 87.3 88.8   MCH 27.8 27.4 27.0   MCHC 31.6 31.4 (A) 30.4 (A)   RDW 16.4 (A) 16.1 (A) 16.0 (A)   Platelets 192 188 194   (A) Abnormal value            BMP    BMP 12/30/21 12/31/21 1/1/22   BUN 68 (A) 67 (A) 80 (A)   Creatinine 1.75 (A) 1.87 (A) 2.08 (A)   Sodium 142 143 142   Potassium 4.0 4.1 4.8   Chloride 104 104 98   CO2 25.6 25.3 26.1   Calcium 9.3 9.1 8.8   (A) Abnormal value       Comments are available for some flowsheets but are not being displayed.           LIVER FUNCTION TESTS:      Lab 01/01/22  0540 12/31/21  0801 12/30/21  1206    TOTAL PROTEIN 6.6 7.1 7.2   ALBUMIN 3.80 4.00 4.30   GLOBULIN  --   --  2.9   ALT (SGPT) 13 11 10   AST (SGOT) 16 16 17   BILIRUBIN <0.2 0.2 0.6   BILIRUBIN DIRECT <0.2 <0.2  --    ALK PHOS 96 107 101       [x]  Microbiology   Blood Culture   Date Value Ref Range Status   12/30/2021 No growth at 2 days  Preliminary   12/30/2021 No growth at 2 days  Preliminary     No results found for: BCIDPCR, CXREFLEX, CSFCX, CULTURETIS  No results found for: CULTURES, HSVCX, URCX  No results found for: EYECULTURE, GCCX, HSVCULTURE, LABHSV  No results found for: LEGIONELLA, MRSACX, MUMPSCX, MYCOPLASCX  No results found for: NOCARDIACX, STOOLCX  Urine Culture   Date Value Ref Range Status   12/31/2021 <10,000 CFU/mL Gram Negative Bacilli (A)  Final     No results found for: VIRALCULTU, WOUNDCX    [x]  Radiology Adult Transthoracic Echo Complete W/ Cont if Necessary Per Protocol    Result Date: 1/1/2022  · The left ventricular cavity is moderately dilated. · Left ventricular wall thickness is consistent with mild concentric hypertrophy. · Left ventricular ejection fraction appears to be 36 - 40%. · Left ventricular diastolic function is consistent with (grade II w/high LAP) pseudonormalization. · The right ventricular cavity is mildly dilated. · Mildly reduced right ventricular systolic function noted. · The right atrial cavity is mildly dilated. · Estimated right ventricular systolic pressure from tricuspid regurgitation is moderately elevated (45-55 mmHg).      XR Chest 1 View    Result Date: 12/30/2021  PROCEDURE: XR CHEST 1 VW  COMPARISON: [facility], CR, CHEST AP/PA 1 VIEW, 3/11/2021, 14:18.  INDICATIONS: SOB, PRODUCTIVE COUGH  FINDINGS:  Left-sided AICD noted.  Stable top-normal heart size.  Mild patchy bibasilar airspace disease may relate to atelectasis, pneumonia not excluded.  No pneumothorax.  No large pleural effusion.  The osseous structures are grossly intact.  CONCLUSION:  1. Patchy bibasilar airspace  disease left greater than right which may relate to atelectasis and/or pneumonia.     DIANDRA MARSHALL MD       Electronically Signed and Approved By: DIANDRA MARSHALL MD on 12/30/2021 at 12:13               [x]  EKG/Telemetry   []  Cardiology/Vascular   []  Pathology  [x]  Old records  []  Other:    Assessment/Plan   Assessment / Plan     Assessment/Plan:  Assessment:  Acute on chronic hypoxemic respiratory failure  Acute decompensation of chronic heart failure with reduced ejection fraction EF 36 to 40%  Community-acquired pneumonia strep pneumo  CKD stage IV  History of atrial fibrillation/flutter currently in paced rhythm  Pacemaker in place  COPD with acute exacerbation  Dyslipidemia  Diabetes mellitus  Elevated troponin; unlikely NSTEMI, likely related to renal function  NSVT      Plan:  Labs and imaging reviewed  Simethicone for belching  Despite her age she wishes to remain a full code  Rising BUN and creatinine, reducing Lasix to 40 mg daily  Nephrology consulted Dr. Chaney, recommendations appreciated  Strict I's and O's  Daily weights  Continue ceftriaxone 1 g and azithromycin 500 mg IV daily for 7 and 3 days respectively  Echocardiogram reviewed  Cardiology consulted from the emergency room Dr. Sosa, recommendations appreciated  Telemetry monitoring continued  Reconcile home medications, resumed accordingly  Aspirin 81 mg continued  Continue Coreg 6.25 mg p.o. twice daily, depending on her blood pressure may need dose increase  Holding losartan and metolazone which are both home medications  Home inhalers held, Kirti Bonilla nebs started  DuoNeb scheduled every 6 hours  Discontinue Solu-Medrol, start prednisone 40 mg daily for 5 days  A.m. labs  Out of bed to chair  Full code  VTE prophylaxis with SCDs  Clinical course to dictate further management  Discussed with nurse at the bedside    DVT prophylaxis:  Mechanical DVT prophylaxis orders are present.    CODE STATUS:   Level Of Support Discussed With:  Patient  Code Status (Patient has no pulse and is not breathing): CPR (Attempt to Resuscitate)  Medical Interventions (Patient has pulse or is breathing): Full Support        Electronically signed by Faith Raygoza MD, 01/01/22, 2:25 PM EST.

## 2022-01-01 NOTE — PROGRESS NOTES
[facility]     Progress Note    Patient Name: Carrington Ledbetter  : 1925  MRN: [id number]  Primary Care Physician:  Jeni Pope DO  Date of admission: 2021    Subjective Patient is looking and feeling better than yesterday  Patient says she had a good sleep last night  She was started on diuretics and steroids  Decent urine output    Review of Systems  Constitutional:        Weakness tiredness fatigue  Eyes:                       No blurry vision, eye discharge, eye irritation, eye pain  HEENT:                   No acute hair loss, earache and discharge, nasal congestion or discharge, sore throat, postnasal drip  Respiratory:           Shortness of breath   cardiovascular:     No chest pain, orthopnea, PND, dizziness, palpitation, lower extremity edema  Gastrointestinal:   No nausea vomiting diarrhea abdominal pain constipation  Genitourinary:       No urinary incontinence, hesitancy, frequency, urgency, dysuria  Hematologic:         No bruising, bleeding, pallor, lymphadenopathy  Endocrine:            No coldness, hot flashes, polyuria, abnormal hair growth  Musculoskeletal:    No body pains, aches, arthritic pains, muscle pain ,muscle wasting  Psychiatric:          No low or high mood, anxiety, hallucinations, delusions  Skin.                      No rash, ulcers, bruising, itching  Neurological:        No confusion, headache, focal weakness, numbness, dysphasia    Objective   Objective     Vitals:   Temp:  [97.2 °F (36.2 °C)-97.9 °F (36.6 °C)] 97.9 °F (36.6 °C)  Heart Rate:  [] 104  Resp:  [16-20] 20  BP: (101-140)/(48-80) 102/56  Flow (L/min):  [2] 2  Physical Exam    Constitutional: Awake, alert responsive, conversant, no obvious distress              Psychiatric:  Appropriate affect, cooperative   Neurologic:  Awake alert ,oriented x 3, strength symmetric in all extremities, Cranial Nerves grossly intact to confrontation, speech clear   Eyes:   PERRLA, sclerae anicteric, no  conjunctival injection   HEENT:  Moist mucous membranes, no nasal or eye discharge, no throat congestion   Neck:   Supple, no thyromegaly, no lymphadenopathy, trachea midline, no elevated JVD   Respiratory:  Expiratory wheezing   cardiovascular: RRR, no murmurs, rubs, or gallops, palpable pedal pulses bilaterally, No bilateral ankle edema   Gastrointestinal: Positive bowel sounds, soft, nontender, nondistended, no organomegaly   Musculoskeletal:  No clubbing or cyanosis to extremities,muscle wasting, joint swelling, muscle weakness             Skin:                      No rashes, bruising, skin ulcers, petechiae or ecchymosis    Result Review    Result Review:  I have personally reviewed the results from the time of this admission to 1/1/2022 07:36 EST and agree with these findings:  []  Laboratory  []  Microbiology  []  Radiology  []  EKG/Telemetry   []  Cardiology/Vascular   []  Pathology  []  Old records  []  Other:    Assessment/Plan   Assessment / Plan       Active Hospital Problems:  Active Hospital Problems    Diagnosis    • Acute on chronic systolic congestive heart failure (HCC)    • Acute on chronic HFrEF (heart failure with reduced ejection fraction) (HCC)    • CRF (chronic renal failure), stage 4 (severe) (HCC)    • Chronic obstructive pulmonary disease (HCC)    • Anemia in chronic kidney disease        Plan:   Decrease diuretics, Lasix from 60 mg every 12 to 40 mg once a day, creatinine is up to 2.0  Continue steroids, it has helped her breathing  Labs tomorrow morning  Patient is on 2 L oxygen O2 sat 95%  Clinically improving       Electronically signed by Demetrice Chaney MD, 01/01/22, 7:36 AM EST.

## 2022-01-01 NOTE — CONSULTS
Cardiology Consult Note  [address]          Patient Identification:  Carrington Ledbetter      [id number]  89 y.o.        female  2/13/1925       Date of Consultation: January 1    Reason for Consultation: Shortness of breath, acute on chronic systolic heart failure    PCP: Jeni Pope DO  Primary cardiologist: Dr. Shultz    History of Present Illness:     89-year-old -American female.  She has a history of chronic systolic heart failure ejection fraction about 30%, permanent pacemaker, chronic kidney disease.  She is admitted with progressive shortness of breath, cough, lower extremity edema, associated with exertion.  No associated chest pain.  She reports compliance with her medications.    Past History:  Past Medical History:   Diagnosis Date   • Arthritis    • CHF (congestive heart failure) ([facility])    • Chronic HFrEF (heart failure with reduced ejection fraction) 9/14/2021   • Dialysis patient ([facility])    • Difficulty walking 01/17/2019   • DM type 2 (diabetes mellitus, type 2) ([facility])    • ESRD (end stage renal disease) on dialysis ([facility]) 9/14/2021   • Essential hypertension    • Foot pain, bilateral 03/06/2018   • Hammer toe    • HBP (high blood pressure)    • Hyperlipidemia    • Kidney failure    • Oxygen dependent    • Oxygen desaturation during sleep      Past Surgical History:   Procedure Laterality Date   • APPENDECTOMY     • CHOLECYSTECTOMY     • HYSTERECTOMY     • INSERT / REPLACE / REMOVE PACEMAKER     • PACEMAKER IMPLANTATION       No Known Allergies  Social History     Socioeconomic History   • Marital status:    Tobacco Use   • Smoking status: Former Smoker     Packs/day: 2.00     Types: Cigarettes   • Smokeless tobacco: Never Used   • Tobacco comment: quit at age 60   Vaping Use   • Vaping Use: Never used   Substance and Sexual Activity   • Alcohol use: Never   • Drug use: Never   • Sexual activity: Defer     Family History   Problem Relation Age of Onset   • Stroke  Mother    • Heart disease Mother    • Diabetes type II Mother    • Heart disease Brother    • Diabetes type II Brother      Medications:  Medications Prior to Admission   Medication Sig Dispense Refill Last Dose   • albuterol sulfate  (90 Base) MCG/ACT inhaler Inhale 2 puffs Every 6 (Six) Hours As Needed.   Unknown at Unknown time   • aspirin (aspirin) 81 MG EC tablet Take 81 mg by mouth Daily.   Unknown at Unknown time   • carvedilol (COREG) 6.25 MG tablet Take 1 tablet by mouth 2 (Two) Times a Day With Meals. Take 2 (two) pills once a day 180 tablet 3 Unknown at Unknown time   • ergocalciferol (ERGOCALCIFEROL) 1.25 MG (01829 UT) capsule Take 50,000 Units by mouth 1 (One) Time Per Week.   Unknown at Unknown time   • ferrous sulfate 325 (65 FE) MG tablet Take 325 mg by mouth Daily With Breakfast.   Unknown at Unknown time   • Fluticasone-Umeclidin-Vilant (Trelegy Ellipta) 200-62.5-25 MCG/INH inhaler Inhale 1 puff Daily for 90 days. Rinse mouth out after each use. 3 each 3    • insulin lispro (humaLOG) 100 UNIT/ML injection Inject  under the skin into the appropriate area as directed.   Unknown at Unknown time   • Klor-Con 8 MEQ CR tablet TAKE 1 TABLET DAILY 30 tablet 0 Unknown at Unknown time   • levalbuterol (XOPENEX) 1.25 MG/3ML nebulizer solution INHALE THE CONTENTS OF 1   VIAL VIA NEBULIZER EVERY 4 HOURS AS NEEDED FOR        SHORTNESS OF BREATH (Patient taking differently: Take 1 ampule by nebulization Every 4 (Four) Hours As Needed.) 900 mL 0 Unknown at Unknown time   • losartan (COZAAR) 25 MG tablet Take 1 tablet by mouth Daily. 90 tablet 3 Unknown at Unknown time     Current medications:  arformoterol, 15 mcg, Nebulization, BID - RT  aspirin, 81 mg, Oral, Daily  azithromycin, 500 mg, Intravenous, Q24H  budesonide, 0.5 mg, Nebulization, BID - RT  carvedilol, 6.25 mg, Oral, BID With Meals  cefTRIAXone, 1 g, Intravenous, Q24H  epoetin ellen/ellen-epbx, 10,000 Units, Subcutaneous, Weekly  [START ON 1/2/2022]  "furosemide, 40 mg, Intravenous, Daily  insulin lispro, 0-7 Units, Subcutaneous, TID AC  ipratropium-albuterol, 3 mL, Nebulization, 4x Daily - RT  iron sucrose, 200 mg, Intravenous, Q24H  methylPREDNISolone sodium succinate, 40 mg, Intravenous, Q12H  senna-docusate sodium, 2 tablet, Oral, BID  sodium chloride, 10 mL, Intravenous, Q12H      Current IV drips:       Review of Systems   Constitutional: Positive for malaise/fatigue.   HENT: Negative.    Eyes: Negative.    Cardiovascular: Positive for dyspnea on exertion and leg swelling. Negative for chest pain.   Respiratory: Positive for shortness of breath and sleep disturbances due to breathing.    Endocrine: Negative.    Hematologic/Lymphatic: Negative.    Skin: Negative.    Musculoskeletal: Negative.    Gastrointestinal: Negative.    Genitourinary: Negative.    Neurological: Negative.    Psychiatric/Behavioral: Negative.          Physical exam:    /56 (BP Location: Right arm, Patient Position: Sitting)   Pulse 72   Temp 97.9 °F (36.6 °C) (Oral)   Resp 20   Ht 149.9 cm (59\")   Wt 59.5 kg (131 lb 2.8 oz)   SpO2 94%   BMI 26.49 kg/m²  Body mass index is 26.49 kg/m².   Oxygen saturation   @FLOWAN(10::1)@ SpO2  Min: 91 %  Max: 98 %    General Appearance:   · well developed  · well nourished  HENT:   · oropharynx moist  · lips not cyanotic  Neck:  · thyroid not enlarged  · supple  Respiratory:  · no respiratory distress  · Scant expiratory wheezes  · no rales  Cardiovascular:  · Mild jugular venous distention  · regular rhythm  · apical impulse normal  · S1 normal, S2 normal  · no S3, no S4   · Grade 2 ejection systolic murmur  · no rub, no thrill  · carotid pulses normal; no bruit  · pedal pulses normal  · lower extremity edema: Trace  Gastrointestinal:   · bowel sounds normal  · non-tender  · no hepatomegaly, no splenomegaly  Musculoskeletal:  · no clubbing of fingers.   · normocephalic, head atraumatic  Skin:   · warm, dry  Neuro/Psychiatric:  · judgement " and insight appropriate  · normal mood and affect    Cardiographics:     ECG  (personally reviewed) sinus rhythm, left bundle branch block   Telemetry:  (personally reviewed) sinus rhythm   ECHO: Reviewed by me  Results for orders placed during the hospital encounter of 12/30/21    Adult Transthoracic Echo Complete W/ Cont if Necessary Per Protocol    Interpretation Summary  · The left ventricular cavity is moderately dilated.  · Left ventricular wall thickness is consistent with mild concentric hypertrophy.  · Left ventricular ejection fraction appears to be 36 - 40%.  · Left ventricular diastolic function is consistent with (grade II w/high LAP) pseudonormalization.  · The right ventricular cavity is mildly dilated.  · Mildly reduced right ventricular systolic function noted.  · The right atrial cavity is mildly dilated.  · Estimated right ventricular systolic pressure from tricuspid regurgitation is moderately elevated (45-55 mmHg).       CATH:     CARDIOLITE:      Lab Review:       Results from last 7 days   Lab Units 01/01/22  0540 12/31/21  0547 12/30/21  1203   WBC 10*3/mm3 7.10 6.56 8.68   HEMOGLOBIN g/dL 10.9* 10.1* 10.9*   HEMATOCRIT % 35.8 32.2* 34.5            Results from last 7 days   Lab Units 01/01/22  0540 12/31/21  0801 12/30/21  1203   SODIUM mmol/L 142 143 142   BUN mg/dL 80* 67* 68*   CREATININE mg/dL 2.08* 1.87* 1.75*   GLUCOSE mg/dL 191* 164* 135*      Estimated Creatinine Clearance: 13.3 mL/min (A) (by C-G formula based on SCr of 2.08 mg/dL (H)).         Invalid input(s): LDLCALC        No results found for: TSH     Lab Results   Component Value Date    HGBA1C 6.1 (H) 05/17/2021           No results found for: DIGOXIN   No components found for: DDIMERQUAN     Imaging:   [unfilled]    Assessment:      Anemia in chronic kidney disease    Chronic obstructive pulmonary disease (HCC)    Presence of cardiac pacemaker dual chamber    CRF (chronic renal failure), stage 4 (severe) (HCC)    Acute on  chronic HFrEF (heart failure with reduced ejection fraction) (HCC)    Acute on chronic systolic congestive heart failure (HCC)      Initial cardiac assessment: 89-year-old -American female with chronic systolic heart failure, pacemaker, end-stage renal disease presents with acute on chronic systolic heart failure.      Recommendations:  1.  Agree with diuresis, nephrology managing  2.  Pacemaker is functioning appropriately  3.  Continue carvedilol, with degree of renal dysfunction would hold off on ACE/ARB/Arni/Aldactone  4.  We will follow                    Dmitry Moreno MD  1/1/2022    10:36 EST

## 2022-01-02 NOTE — PROGRESS NOTES
[facility]   Hospitalist Progress Note  Date: 2022  Patient Name: Carrington Ledbetter  : 1925  MRN: [id number]  Date of admission: 2021      Subjective   Subjective     Chief complaint: Shortness of breath  Summary:  89-year-old female hospitalized on 2021 with chief complaint of shortness of breath, chest discomfort and cough.  Acute on chronic hypoxemic respiratory failure secondary to acute decompensation of chronic systolic heart failure, with concern for community-acquired pneumonia, cardiology and nephrology consulted with underlying CKD stage IV, slightly elevated troponin related to her renal function, unlikely NSTEMI.  Does have history of left bundle branch block which is old.  Diuresis went well with IV Lasix.  Change to p.o.  She tested positive for strep pneumo antigen, likely etiology of her pneumonia     Interval follow-up: Patient seen and examined this morning, no acute distress, no acute major overnight events sitting in bedside chair, notes lower extreme edema has nearly resolved, no increased work of breathing, has less cough, remains at her baseline oxygen at 2 L.  Denies fevers, chills, sweats.  Less belching and gas pain.  Blood sugars are stable.  Creatinine down to 1.96.  White blood cell count 8000.  Hemoglobin at 10.6.  Net -290 mL.  Telemetry reviewed, runs of PVCs, baseline paced rhythm at 70.     Review of systems:  All systems reviewed and negative except for cough, improving shortness of breath, fatigue.     Objective   Objective     Vitals:   Temp:  [97.5 °F (36.4 °C)-98.2 °F (36.8 °C)] 97.6 °F (36.4 °C)  Heart Rate:  [68-85] 76  Resp:  [16-24] 20  BP: ()/(42-54) 122/54  Flow (L/min):  [2] 2  Physical Exam    Constitutional: Awake, alert, no acute distress, sitting in bedside chair, breathing with nasal cannula oxygen at 2 L              Eyes: Pupils equal, sclerae anicteric, no conjunctival injection              HENT: NCAT, mucous membranes moist               Neck: Supple, full range of motion              Respiratory: Air entry bilaterally, coarse breath sounds throughout, rhonchi, fine crackles at the bases              Cardiovascular: Paced rhythm, 70 bpm, 2 out of 6 systolic murmurs, palpable pedal pulses bilaterally              Gastrointestinal: Positive bowel sounds, soft, nontender, nondistended              Musculoskeletal: Trace pitting bilateral ankle edema, no clubbing or cyanosis to extremities              Psychiatric: Appropriate affect, cooperative              Neurologic: Oriented x 3, strength symmetric in all extremities, Cranial Nerves grossly intact to confrontation, speech clear              Skin: No rashes     Result Review    Result Review:  I have personally reviewed the results from the time of this admission to 1/2/2022 16:46 EST and agree with these findings:  [x]  Laboratory   CBC    CBC 12/31/21 1/1/22 1/2/22   WBC 6.56 7.10 8.55   RBC 3.69 (A) 4.03 3.91   Hemoglobin 10.1 (A) 10.9 (A) 10.6 (A)   Hematocrit 32.2 (A) 35.8 33.9 (A)   MCV 87.3 88.8 86.7   MCH 27.4 27.0 27.1   MCHC 31.4 (A) 30.4 (A) 31.3 (A)   RDW 16.1 (A) 16.0 (A) 15.9 (A)   Platelets 188 194 201   (A) Abnormal value            BMP    BMP 12/31/21 1/1/22 1/2/22   BUN 67 (A) 80 (A) 91 (A)   Creatinine 1.87 (A) 2.08 (A) 1.96 (A)   Sodium 143 142 143   Potassium 4.1 4.8 4.0   Chloride 104 98 101   CO2 25.3 26.1 28.2   Calcium 9.1 8.8 8.3   (A) Abnormal value       Comments are available for some flowsheets but are not being displayed.           LIVER FUNCTION TESTS:      Lab 01/02/22  0614 01/01/22  0540 12/31/21  0801 12/30/21  1203   TOTAL PROTEIN 6.5 6.6 7.1 7.2   ALBUMIN 3.70 3.80 4.00 4.30   GLOBULIN 2.8  --   --  2.9   ALT (SGPT) 11 13 11 10   AST (SGOT) 10 16 16 17   BILIRUBIN <0.2 <0.2 0.2 0.6   BILIRUBIN DIRECT <0.2 <0.2 <0.2  --    ALK PHOS 94 96 107 101       [x]  Microbiology   Blood Culture   Date Value Ref Range Status   12/30/2021 No growth at 3 days   Preliminary   12/30/2021 No growth at 3 days  Preliminary     No results found for: BCIDPCR, CXREFLEX, CSFCX, CULTURETIS  No results found for: CULTURES, HSVCX, URCX  No results found for: EYECULTURE, GCCX, HSVCULTURE, LABHSV  No results found for: LEGIONELLA, MRSACX, MUMPSCX, MYCOPLASCX  No results found for: NOCARDIACX, STOOLCX  Urine Culture   Date Value Ref Range Status   12/31/2021 <10,000 CFU/mL Gram Negative Bacilli (A)  Final     No results found for: VIRALCULTU, WOUNDCX    [x]  Radiology Adult Transthoracic Echo Complete W/ Cont if Necessary Per Protocol    Result Date: 1/1/2022  · The left ventricular cavity is moderately dilated. · Left ventricular wall thickness is consistent with mild concentric hypertrophy. · Left ventricular ejection fraction appears to be 36 - 40%. · Left ventricular diastolic function is consistent with (grade II w/high LAP) pseudonormalization. · The right ventricular cavity is mildly dilated. · Mildly reduced right ventricular systolic function noted. · The right atrial cavity is mildly dilated. · Estimated right ventricular systolic pressure from tricuspid regurgitation is moderately elevated (45-55 mmHg).      XR Chest 1 View    Result Date: 12/30/2021  PROCEDURE: XR CHEST 1 VW  COMPARISON: [facility], , CHEST AP/PA 1 VIEW, 3/11/2021, 14:18.  INDICATIONS: SOB, PRODUCTIVE COUGH  FINDINGS:  Left-sided AICD noted.  Stable top-normal heart size.  Mild patchy bibasilar airspace disease may relate to atelectasis, pneumonia not excluded.  No pneumothorax.  No large pleural effusion.  The osseous structures are grossly intact.  CONCLUSION:  1. Patchy bibasilar airspace disease left greater than right which may relate to atelectasis and/or pneumonia.     DIANDRA MARSHALL MD       Electronically Signed and Approved By: DIANDRA MARSHALL MD on 12/30/2021 at 12:13               [x]  EKG/Telemetry   []  Cardiology/Vascular   []  Pathology  [x]  Old records  []   Other:    Assessment/Plan   Assessment / Plan     Assessment/Plan:  Assessment:  Acute on chronic hypoxemic respiratory failure  Acute decompensation of chronic heart failure with reduced ejection fraction EF 36 to 40%  Community-acquired pneumonia strep pneumo  CKD stage IV  History of atrial fibrillation/flutter currently in paced rhythm  Pacemaker in place  COPD with acute exacerbation  Dyslipidemia  Diabetes mellitus  Elevated troponin; unlikely NSTEMI, likely related to renal function  NSVT      Plan:  Labs and imaging reviewed  Continue out of bed to chair  PT/OT  Continue Lasix 40 mg p.o. daily  Nephrology consulted Dr. Chaney, recommendations appreciated  Strict I's and O's  Daily weights  Continue ceftriaxone 1 g and azithromycin 500 mg IV daily for 7 and 3 days respectively  reviewed  Cardiology consulted from the emergency room Dr. Sosa, recommendations appreciated  Telemetry monitoring continued  Reconcile home medications, resumed accordingly  Aspirin 81 mg continued  Continue Coreg 6.25 mg p.o. twice daily, depending on her blood pressure may need dose increase  Holding losartan and metolazone which are both home medications  Home inhalers held, Brovana Pulmicort nebs started  DuoNeb scheduled every 6 hours  Continue prednisone 40 mg daily for 5 days  A.m. labs  Out of bed to chair  Full code  VTE prophylaxis with SCDs  Clinical course to dictate further management  Discussed with nurse at the bedside    DVT prophylaxis:  Mechanical DVT prophylaxis orders are present.    CODE STATUS:   Level Of Support Discussed With: Patient  Code Status (Patient has no pulse and is not breathing): CPR (Attempt to Resuscitate)  Medical Interventions (Patient has pulse or is breathing): Full Support        Electronically signed by Faith Raygoza MD, 01/02/22, 4:46 PM EST.

## 2022-01-02 NOTE — PROGRESS NOTES
CARDIOLOGY  INPATIENT PROGRESS NOTE         [address]    1/2/2022      PATIENT IDENTIFICATION:   Name:  Carrington Ledbetter      MRN:  [id number]     89 y.o.  female             Reason for visit: Acute on chronic systolic heart failure, CKD      SUBJECTIVE:    The patient is feeling a little better, still with some nonproductive cough.  Vital signs stable.  Fluid balance charted as slightly negative  OBJECTIVE:  Vitals:    01/02/22 0738 01/02/22 0820 01/02/22 0825 01/02/22 0830   BP: 127/50      BP Location: Right arm      Patient Position: Sitting      Pulse: 71 85 85 80   Resp: 20 20 20 18   Temp: 98.1 °F (36.7 °C)      TempSrc: Oral      SpO2: 100% 92% 92% 92%   Weight:       Height:               Body mass index is 26.54 kg/m².    Intake/Output Summary (Last 24 hours) at 1/2/2022 1102  Last data filed at 1/2/2022 0940  Gross per 24 hour   Intake 770 ml   Output 1200 ml   Net -430 ml       Telemetry: Sinus rhythm with ectopy occasional ventricular pacing    Review of Systems   Respiratory: Positive for cough and shortness of breath. Negative for chest tightness.          Exam:  General appearance no acute distress    well nourished   HEENT sclerae non-icteric    lips not cyanotic   Respiratory rate and depth normal    normal breath sounds    no rales, no wheeze   Cardiovascular JVP normal    regular rhythm    S1 normal, S2 normal    no S3, no S4    Grade 2 parasternal systolic murmur    lower extremity edema: Trace   Abdominal bowel sounds normal    abdomen soft, non-tender    no hepatosplenomegaly   Neuro-psych oriented to person, place, time    cooperative     No Known Allergies  Scheduled meds:  arformoterol, 15 mcg, Nebulization, BID - RT  aspirin, 81 mg, Oral, Daily  budesonide, 0.5 mg, Nebulization, BID - RT  carvedilol, 6.25 mg, Oral, BID With Meals  cefTRIAXone, 1 g, Intravenous, Q24H  epoetin ellen/ellen-epbx, 10,000 Units, Subcutaneous, Weekly  [START ON 1/3/2022] furosemide, 40 mg,  Oral, Daily  insulin lispro, 0-7 Units, Subcutaneous, TID AC  ipratropium-albuterol, 3 mL, Nebulization, 4x Daily - RT  predniSONE, 40 mg, Oral, Daily With Breakfast  senna-docusate sodium, 2 tablet, Oral, BID  sodium chloride, 10 mL, Intravenous, Q12H      IV meds:                         Data Review:  Results from last 7 days   Lab Units 01/02/22  0614 01/01/22  0540 12/31/21  0801 12/30/21  1203   SODIUM mmol/L 143 142 143 142   BUN mg/dL 91* 80* 67* 68*   CREATININE mg/dL 1.96* 2.08* 1.87* 1.75*   GLUCOSE mg/dL 245* 191* 164* 135*             Estimated Creatinine Clearance: 14.1 mL/min (A) (by C-G formula based on SCr of 1.96 mg/dL (H)).  Results from last 7 days   Lab Units 01/02/22  0614 01/01/22  0540 12/31/21  0547 12/30/21  1203   WBC 10*3/mm3 8.55 7.10 6.56 8.68   HEMOGLOBIN g/dL 10.6* 10.9* 10.1* 10.9*         Results from last 7 days   Lab Units 01/02/22  0614 01/01/22  0540 12/31/21  0801 12/30/21  1203   ALT (SGPT) U/L 11 13 11 10   AST (SGOT) U/L 10 16 16 17     No results found for: DIGOXIN   No results found for: TSH        Invalid input(s): LDLCALC            Imaging (last 24 hr):   Imaging Results (Last 24 Hours)     ** No results found for the last 24 hours. **            ASSESSMENT:     Anemia in chronic kidney disease    Chronic obstructive pulmonary disease (HCC)    Presence of cardiac pacemaker dual chamber    CRF (chronic renal failure), stage 4 (severe) ([facility])    Acute on chronic HFrEF (heart failure with reduced ejection fraction) ([facility])    Acute on chronic systolic congestive heart failure ([facility])            PLAN:  1.  Diuretics per nephrology  2.  Continue beta-blocker for systolic heart failure  3.  No ACE/ARB/Arni or Aldactone due to poor GFR  4.  Pacemaker is functioning appropriately                Dmitry Moreno MD  1/2/2022    11:02 EST

## 2022-01-02 NOTE — PLAN OF CARE
Goal Outcome Evaluation:  Plan of Care Reviewed With: patient        Progress: no change  Outcome Summary: Patient slept in chair for comfort. PRN tessalon perles given for frequent congested nonproductive cough

## 2022-01-02 NOTE — PROGRESS NOTES
[facility]     Progress Note    Patient Name: Carrington Ledbetter  : 1925  MRN: [id number]  Primary Care Physician:  Jeni Pope DO  Date of admission: 2021    Subjective Patient is feeling very good and her strength appetite shortness of breath is improved  Patient has been switched to p.o. prednisone    Review of Systems  Constitutional:        Weakness tiredness fatigue  Eyes:                       No blurry vision, eye discharge, eye irritation, eye pain  HEENT:                   No acute hair loss, earache and discharge, nasal congestion or discharge, sore throat, postnasal drip  Respiratory:           No shortness of breath coughing sputum production wheezing hemoptysis pleuritic chest pain  Cardiovascular:     No chest pain, orthopnea, PND, dizziness, palpitation, lower extremity edema  Gastrointestinal:   No nausea vomiting diarrhea abdominal pain constipation  Genitourinary:       No urinary incontinence, hesitancy, frequency, urgency, dysuria  Hematologic:         No bruising, bleeding, pallor, lymphadenopathy  Endocrine:            No coldness, hot flashes, polyuria, abnormal hair growth  Musculoskeletal:    No body pains, aches, arthritic pains, muscle pain ,muscle wasting  Psychiatric:          No low or high mood, anxiety, hallucinations, delusions  Skin.                      No rash, ulcers, bruising, itching  Neurological:        No confusion, headache, focal weakness, numbness, dysphasia    Objective   Objective     Vitals:   Temp:  [97.3 °F (36.3 °C)-98.2 °F (36.8 °C)] 98.1 °F (36.7 °C)  Heart Rate:  [68-85] 80  Resp:  [16-24] 18  BP: ()/(42-53) 127/50  Flow (L/min):  [2] 2  Physical Exam    Constitutional: Awake, alert responsive, conversant, no obvious distress              Psychiatric:  Appropriate affect, cooperative   Neurologic:  Awake alert ,oriented x 3, strength symmetric in all extremities, Cranial Nerves grossly intact to confrontation, speech  clear   Eyes:   PERRLA, sclerae anicteric, no conjunctival injection   HEENT:  Moist mucous membranes, no nasal or eye discharge, no throat congestion   Neck:   Supple, no thyromegaly, no lymphadenopathy, trachea midline, no elevated JVD   Respiratory:  Clear to auscultation bilaterally, nonlabored respirations    Cardiovascular: RRR, no murmurs, rubs, or gallops, palpable pedal pulses bilaterally, No bilateral ankle edema   Gastrointestinal: Positive bowel sounds, soft, nontender, nondistended, no organomegaly   Musculoskeletal:  No clubbing or cyanosis to extremities,muscle wasting, joint swelling, muscle weakness             Skin:                      No rashes, bruising, skin ulcers, petechiae or ecchymosis    Result Review    Result Review:  I have personally reviewed the results from the time of this admission to 1/2/2022 10:33 EST and agree with these findings:  []  Laboratory  []  Microbiology  []  Radiology  []  EKG/Telemetry   []  Cardiology/Vascular   []  Pathology  []  Old records  []  Other:    Assessment/Plan   Assessment / Plan       Active Hospital Problems:  Active Hospital Problems    Diagnosis    • Acute on chronic systolic congestive heart failure (HCC)    • Acute on chronic HFrEF (heart failure with reduced ejection fraction) (HCC)    • CRF (chronic renal failure), stage 4 (severe) (HCC)    • Presence of cardiac pacemaker dual chamber    • Chronic obstructive pulmonary disease (HCC)    • Anemia in chronic kidney disease        Plan:   Resume low-dose p.o. diuretics  Patient is making good progress       Electronically signed by Demetrice Chaney MD, 01/02/22, 10:33 AM EST.

## 2022-01-03 NOTE — PROGRESS NOTES
[facility]     Progress Note    Patient Name: Carrington Ledbetter  : 1925  MRN: [id number]  Primary Care Physician:  Jeni Pope DO  Date of admission: 2021    Subjective   Patient reports she is feeling much better and is ready to go home.  States breathing and edema are stable.  No other concerns noted  Review of Systems  Eyes:                       No blurry vision, eye discharge, eye irritation, eye pain  HEENT:                   No acute hair loss, earache and discharge, nasal congestion or discharge, sore throat, postnasal drip  Respiratory:           No shortness of breath coughing sputum production wheezing hemoptysis pleuritic chest pain  Cardiovascular:     No chest pain, orthopnea, PND, dizziness, palpitation, lower extremity edema  Gastrointestinal:   No nausea vomiting diarrhea abdominal pain constipation  Genitourinary:       No urinary incontinence, hesitancy, frequency, urgency, dysuria  Hematologic:         No bruising, bleeding, pallor, lymphadenopathy  Endocrine:            No coldness, hot flashes, polyuria, abnormal hair growth  Musculoskeletal:    No body pains, aches, arthritic pains, muscle pain ,muscle wasting  Psychiatric:          No low or high mood, anxiety, hallucinations, delusions  Skin.                      No rash, ulcers, bruising, itching  Neurological:        No confusion, headache, focal weakness, numbness, dysphasia    Objective   Objective     Vitals:   Temp:  [97.5 °F (36.4 °C)-98.1 °F (36.7 °C)] 98.1 °F (36.7 °C)  Heart Rate:  [] 103  Resp:  [16-20] 18  BP: (109-133)/(51-69) 109/69  Flow (L/min):  [2] 2  Physical Exam    Constitutional: Awake, alert responsive, conversant, no obvious distress              Psychiatric:  Appropriate affect, cooperative   Neurologic:  Awake alert ,oriented x 3, strength symmetric in all extremities, Cranial Nerves grossly intact to confrontation, speech clear   Eyes:   PERRLA, sclerae anicteric, no conjunctival  injection   HEENT:  Moist mucous membranes, no nasal or eye discharge, no throat congestion      Respiratory:  Clear to auscultation bilaterally, nonlabored respirations    Cardiovascular: RRR, no murmurs, rubs, or gallops, palpable pedal pulses bilaterally, No bilateral ankle edema   Gastrointestinal: Positive bowel sounds, soft, nontender, nondistended, no organomegaly   Musculoskeletal:  No clubbing or cyanosis to extremities,muscle wasting, joint swelling, muscle weakness             Skin:                      No rashes, bruising, skin ulcers, petechiae or ecchymosis    Result Review    Result Review:  I have personally reviewed the results from the time of this admission to 1/3/2022 09:32 EST and agree with these findings:  [x]  Laboratory  [x]  Microbiology  []  Radiology  []  EKG/Telemetry   []  Cardiology/Vascular   []  Pathology  []  Old records  []  Other:    Assessment/Plan   Assessment / Plan       Active Hospital Problems:  Active Hospital Problems    Diagnosis    • Acute on chronic systolic congestive heart failure (HCC)    • Acute on chronic HFrEF (heart failure with reduced ejection fraction) ([facility])    • CRF (chronic renal failure), stage 4 (severe) ([facility])    • Presence of cardiac pacemaker dual chamber    • Chronic obstructive pulmonary disease (HCC)    • Anemia in chronic kidney disease        Plan:   Cr stable around 2 and follow diuretic plan    HGB 10.9 and stable-we will trend in clinics for Procrit and Injectofer needs because she cannot tolerate PO iron    Make appointment to follow in clinic for post hospital follow up  I have personally reviewed all the information and agree with the plan  Electronically signed by SIDNEY Chowdhury, 01/03/22, 9:32 AM EST.

## 2022-01-03 NOTE — PLAN OF CARE
Goal Outcome Evaluat      Pt sleeps up in chair. Standby assist to bedside commode. Pt declined to take evening dose of stool softener. Pt with runs of svt and afib when up to the bsc. When looking back at previous alarms this has been her norm.

## 2022-01-03 NOTE — PROGRESS NOTES
CARDIOLOGY  INPATIENT PROGRESS NOTE                [facility] UNIT    1/3/2022      PATIENT IDENTIFICATION:   Name:  Carrington Ledbetter      MRN:  [id number]     89 y.o.  female                 SUBJECTIVE:   Patient with no new complaints or issues overnight    OBJECTIVE:  Vitals:    01/03/22 0251 01/03/22 0441 01/03/22 0640 01/03/22 0749   BP: 121/64   109/69   BP Location: Left arm   Right arm   Patient Position: Lying   Lying   Pulse: 77  98 103   Resp: 20  16 18   Temp: 98.1 °F (36.7 °C)   98.1 °F (36.7 °C)   TempSrc: Oral   Oral   SpO2: 99%  93% 92%   Weight:  60.5 kg (133 lb 6.1 oz)     Height:               Body mass index is 26.94 kg/m².    Intake/Output Summary (Last 24 hours) at 1/3/2022 1052  Last data filed at 1/3/2022 1009  Gross per 24 hour   Intake 960 ml   Output 1600 ml   Net -640 ml       Telemetry: Normal sinus rhythm with intermittent paced beats sinus tachycardia    Physical Exam  General Appearance:   · no acute distress  · Alert and oriented x3  HENT:   · lips not cyanotic  · Atraumatic  Neck:  · No jvd   · supple  Respiratory:  · no respiratory distress  · Bilateral crackles and expiratory wheezes  · no rales  Cardiovascular:    · regular rhythm  · no S3, no S4   · no murmur  · no rub  · lower extremity edema: Trace  Skin:   · warm, dry  · No rashes      No Known Allergies  Scheduled meds:  arformoterol, 15 mcg, Nebulization, BID - RT  aspirin, 81 mg, Oral, Daily  budesonide, 0.5 mg, Nebulization, BID - RT  carvedilol, 6.25 mg, Oral, BID With Meals  cefTRIAXone, 1 g, Intravenous, Q24H  epoetin ellen/ellen-epbx, 10,000 Units, Subcutaneous, Weekly  furosemide, 40 mg, Oral, Daily  insulin lispro, 0-7 Units, Subcutaneous, TID AC  ipratropium-albuterol, 3 mL, Nebulization, 4x Daily - RT  predniSONE, 40 mg, Oral, Daily With Breakfast  senna-docusate sodium, 2 tablet, Oral, BID  sodium chloride, 10 mL, Intravenous, Q12H      IV meds:                         Data Review:  CBC     CBC 1/1/22 1/2/22 1/3/22   WBC 7.10 8.55 9.08   RBC 4.03 3.91 3.96   Hemoglobin 10.9 (A) 10.6 (A) 10.9 (A)   Hematocrit 35.8 33.9 (A) 34.7   MCV 88.8 86.7 87.6   MCH 27.0 27.1 27.5   MCHC 30.4 (A) 31.3 (A) 31.4 (A)   RDW 16.0 (A) 15.9 (A) 15.8 (A)   Platelets 194 201 227   (A) Abnormal value            CMP    CMP 1/1/22 1/1/22 1/2/22 1/3/22 1/3/22    0540 0540  0426 0426   Glucose  191 (A) 245 (A)  131 (A)   BUN  80 (A) 91 (A)  92 (A)   Creatinine  2.08 (A) 1.96 (A)  2.16 (A)   eGFR  Am  27 (A) 29 (A)  26 (A)   Sodium  142 143  146 (A)   Potassium  4.8 4.0  3.8   Chloride  98 101  102   Calcium  8.8 8.3  8.3   Albumin 3.80  3.70 3.70    Total Bilirubin <0.2  <0.2 <0.2    Alkaline Phosphatase 96  94 87    AST (SGOT) 16  10 10    ALT (SGPT) 13  11 10    (A) Abnormal value       Comments are available for some flowsheets but are not being displayed.            CARDIAC LABS:      Lab 01/01/22  0540 12/30/21  1734 12/30/21  1203   PROBNP 14,327.0*  --  21,518.0*   TROPONIN T  --  0.087* 0.151*        No results found for: DIGOXIN   No results found for: TSH        Invalid input(s): LDLCALC  No results found for: POCTROP  Lab Results   Component Value Date    TROPONINT 0.087 (C) 12/30/2021   (  Lab Results   Component Value Date    MG 2.0 01/03/2022     Results for orders placed during the hospital encounter of 12/30/21    Adult Transthoracic Echo Complete W/ Cont if Necessary Per Protocol    Interpretation Summary  · The left ventricular cavity is moderately dilated.  · Left ventricular wall thickness is consistent with mild concentric hypertrophy.  · Left ventricular ejection fraction appears to be 36 - 40%.  · Left ventricular diastolic function is consistent with (grade II w/high LAP) pseudonormalization.  · The right ventricular cavity is mildly dilated.  · Mildly reduced right ventricular systolic function noted.  · The right atrial cavity is mildly dilated.  · Estimated right ventricular systolic pressure  from tricuspid regurgitation is moderately elevated (45-55 mmHg).           ASSESSMENT:    Presence of cardiac pacemaker dual chamber    Anemia in chronic kidney disease    Chronic obstructive pulmonary disease ([facility])    CRF (chronic renal failure), stage 4 (severe) ([facility])    Acute on chronic HFrEF (heart failure with reduced ejection fraction) ([facility])    Acute on chronic systolic congestive heart failure ([facility])        PLAN:  1.  Coreg 25 twice daily  2.  Lasix 40 mg p.o. daily  3.  Continue to monitor on telemetry          Scout Shultz MD  1/3/2022    10:52 EST

## 2022-01-03 NOTE — PLAN OF CARE
Goal Outcome Evaluation:  Plan of Care Reviewed With: patient           Outcome Summary: Patient is able to complete all transfers with standby assistance.  She is able to ambulate with a rolling walker and standby assistance.  Patient is being discharged home later today and reports that her family will be able to assist her upon return home.  She does not need inpatient PT services at this point in time.

## 2022-01-03 NOTE — DISCHARGE SUMMARY
[facility]         HOSPITALIST  DISCHARGE SUMMARY    Patient Name: Carrington Ledbetter  : 1925  MRN: [id number]    Date of Admission: 2021  Date of Discharge:  1/3/2022    Primary Care Physician: Jeni Pope DO    Consults     Date and Time Order Name Status Description    2021 10:35 AM Inpatient Nephrology Consult      2021  1:53 PM Hospitalist (on-call MD unless specified)      2021  1:44 PM Cardiology (on-call MD unless specified) Completed           Active and Resolved Hospital Problems:  Acute on chronic hypoxemic respiratory failure  Acute decompensation of chronic heart failure with reduced ejection fraction EF 36 to 40%  Community-acquired pneumonia strep pneumo  CKD stage IV  History of atrial fibrillation/flutter currently in paced rhythm  Pacemaker in place  COPD with acute exacerbation  Dyslipidemia  Diabetes mellitus  Elevated troponin; unlikely NSTEMI, likely related to renal function  NSVT   Active Hospital Problems    Diagnosis POA   • Acute on chronic systolic congestive heart failure (HCC) [I50.23] Yes   • Acute on chronic HFrEF (heart failure with reduced ejection fraction) (HCC) [I50.23] Yes   • CRF (chronic renal failure), stage 4 (severe) (HCC) [N18.4] Yes   • Presence of cardiac pacemaker dual chamber [Z95.0] Yes   • Chronic obstructive pulmonary disease (HCC) [J44.9] Yes   • Anemia in chronic kidney disease [N18.9, D63.1] Yes      Resolved Hospital Problems   No resolved problems to display.       Hospital Course     Hospital Course:  89-year-old female hospitalized on 2021 with chief complaint of shortness of breath, chest discomfort and cough.  Acute on chronic hypoxemic respiratory failure secondary to acute decompensation of chronic systolic heart failure, with concern for community-acquired pneumonia, cardiology and nephrology consulted with underlying CKD stage IV, slightly elevated troponin related to her renal function, unlikely  NSTEMI.  Does have history of left bundle branch block which is old.  Diuresis went well with IV Lasix.  Change to p.o.  She tested positive for strep pneumo antigen, likely etiology of her pneumonia, she was kept on treatment with antibiotics.  She had runs of SVT at times, A. fib.  This has been consistent throughout the hospitalization.  Kept on beta-blocker.  Asymptomatic from these episodes.  Breathing improved down to her baseline oxygen rate.  Steroids helped as well.  As of 1/3/2022 she has to be discharged home, she was hemodynamically stable at time of discharge with home health arrangements, she is to continue diuretics, complete course of cefdinir renally dosed, and to follow-up with nephrology in 1 week and cardiology in 2 weeks.  Kept on diuretics Lasix 20 mg daily.      Day of Discharge     Vital Signs:  Temp:  [97.5 °F (36.4 °C)-98.1 °F (36.7 °C)] 98.1 °F (36.7 °C)  Heart Rate:  [] 103  Resp:  [16-20] 18  BP: (109-133)/(51-69) 109/69  Flow (L/min):  [2] 2  Review of systems  All systems reviewed and negative except for improving cough and shortness of breath.  Generalized fatigue.    Physical Exam                         Constitutional: Awake, alert, no acute distress, sitting in bedside chair, breathing with nasal cannula oxygen at 2 L              Eyes: Pupils equal, sclerae anicteric, no conjunctival injection              HENT: NCAT, mucous membranes moist              Neck: Supple, full range of motion              Respiratory: Air entry bilaterally, coarse breath sounds throughout, rhonchi, fine crackles at the bases              Cardiovascular: Paced rhythm, 70 bpm, 2 out of 6 systolic murmurs, palpable pedal pulses bilaterally              Gastrointestinal: Positive bowel sounds, soft, nontender, nondistended              Musculoskeletal: No bilateral ankle edema, no clubbing or cyanosis to extremities              Psychiatric: Appropriate affect, cooperative              Neurologic:  Oriented x 3, strength symmetric in all extremities, Cranial Nerves grossly intact to confrontation, speech clear              Skin: No rashes       Discharge Details        Discharge Medications      New Medications      Instructions Start Date   cefdinir 300 MG capsule  Commonly known as: OMNICEF   300 mg, Oral, Daily      furosemide 20 MG tablet  Commonly known as: LASIX   20 mg, Oral, Daily   Start Date: January 4, 2022     predniSONE 20 MG tablet  Commonly known as: DELTASONE   40 mg, Oral, Daily With Breakfast   Start Date: January 4, 2022        Changes to Medications      Instructions Start Date   levalbuterol 1.25 MG/3ML nebulizer solution  Commonly known as: XOPENEX  What changed: See the new instructions.   INHALE THE CONTENTS OF 1   VIAL VIA NEBULIZER EVERY 4 HOURS AS NEEDED FOR        SHORTNESS OF BREATH         Continue These Medications      Instructions Start Date   albuterol sulfate  (90 Base) MCG/ACT inhaler  Commonly known as: PROVENTIL HFA;VENTOLIN HFA;PROAIR HFA   2 puffs, Inhalation, Every 6 Hours PRN      aspirin 81 MG EC tablet   81 mg, Oral, Daily      carvedilol 6.25 MG tablet  Commonly known as: COREG   6.25 mg, Oral, 2 Times Daily With Meals, Take 2 (two) pills once a day      ergocalciferol 1.25 MG (17501 UT) capsule  Commonly known as: ERGOCALCIFEROL   50,000 Units, Oral, Weekly      ferrous sulfate 325 (65 FE) MG tablet   325 mg, Oral, Daily With Breakfast      insulin lispro 100 UNIT/ML injection  Commonly known as: humaLOG   Subcutaneous      Klor-Con 8 MEQ CR tablet  Generic drug: potassium chloride   TAKE 1 TABLET DAILY      losartan 25 MG tablet  Commonly known as: COZAAR   25 mg, Oral, Daily      Trelegy Ellipta 200-62.5-25 MCG/INH inhaler  Generic drug: Fluticasone-Umeclidin-Vilant   1 puff, Inhalation, Daily - RT, Rinse mouth out after each use.             No Known Allergies    Discharge Disposition:  Home-Health Care [facility] [place]    Diet:  Hospital:  Diet Order   Procedures   •  Diet Regular; Cardiac, Consistent Carbohydrate       Discharge Activity: as tolerates      CODE STATUS:  Code Status and Medical Interventions:   Ordered at: 01/01/22 0812     Level Of Support Discussed With:    Patient     Code Status (Patient has no pulse and is not breathing):    CPR (Attempt to Resuscitate)     Medical Interventions (Patient has pulse or is breathing):    Full Support         Future Appointments   Date Time Provider Department Center   1/17/2022 11:15 AM [facility] BRNDNBRG CT 1 BH [place] BRNCT [facility]   1/26/2022 11:00 AM Zaki Marcum MD [facility] [place] [facility]   1/31/2022 10:45 AM Cosmo Garcia DPM [facility] POD [place] [facility]   5/3/2022  1:00 PM [facility] CARD [place] DEVICE CHECK [facility] CD [place] [facility]   5/3/2022  1:30 PM Scout Shultz MD [facility] [place]       Additional Instructions for the Follow-ups that You Need to Schedule     Discharge Follow-up with PCP   As directed       Currently Documented PCP:    Jeni Pope DO    PCP Phone Number:    [phone]     Follow Up Details: 3 to 7 days         Discharge Follow-up with Specified Provider: Dr. Chaney in 1 week, Cardiology Dr Shultz in 2 weeks   As directed      To: Dr. Chaney in 1 week, Cardiology Dr Shultz in 2 weeks               Pertinent  and/or Most Recent Results     PROCEDURES:   Adult Transthoracic Echo Complete W/ Cont if Necessary Per Protocol    Result Date: 1/1/2022  · The left ventricular cavity is moderately dilated. · Left ventricular wall thickness is consistent with mild concentric hypertrophy. · Left ventricular ejection fraction appears to be 36 - 40%. · Left ventricular diastolic function is consistent with (grade II w/high LAP) pseudonormalization. · The right ventricular cavity is mildly dilated. · Mildly reduced right ventricular systolic function noted. · The right atrial cavity is mildly dilated. · Estimated right ventricular systolic pressure from tricuspid regurgitation is moderately elevated (45-55 mmHg).      XR Chest 1 View    Result Date:  12/30/2021  PROCEDURE: XR CHEST 1 VW  COMPARISON: [facility], CR, CHEST AP/PA 1 VIEW, 3/11/2021, 14:18.  INDICATIONS: SOB, PRODUCTIVE COUGH  FINDINGS:  Left-sided AICD noted.  Stable top-normal heart size.  Mild patchy bibasilar airspace disease may relate to atelectasis, pneumonia not excluded.  No pneumothorax.  No large pleural effusion.  The osseous structures are grossly intact.  CONCLUSION:  1. Patchy bibasilar airspace disease left greater than right which may relate to atelectasis and/or pneumonia.     DIANDRA MARSHALL MD       Electronically Signed and Approved By: DIANDRA MARSHALL MD on 12/30/2021 at 12:13               LAB RESULTS:      Lab 01/03/22  0426 01/02/22  0614 01/01/22  0540 12/31/21  0547 12/30/21  1203   WBC 9.08 8.55 7.10 6.56 8.68   HEMOGLOBIN 10.9* 10.6* 10.9* 10.1* 10.9*   HEMATOCRIT 34.7 33.9* 35.8 32.2* 34.5   PLATELETS 227 201 194 188 192   NEUTROS ABS 6.20 6.81 6.28 5.35 5.22   IMMATURE GRANS (ABS) 0.20* 0.12* 0.02 0.03 0.03   LYMPHS ABS 1.67 0.98 0.68* 0.82 2.44   MONOS ABS 0.99* 0.63 0.11 0.35 0.83   EOS ABS 0.00 0.00 0.00 0.00 0.13   MCV 87.6 86.7 88.8 87.3 88.0   SED RATE  --  13  --   --   --    CRP  --  2.31*  --   --   --    LACTATE  --   --   --   --  1.0         Lab 01/03/22  0426 01/02/22  0614 01/01/22  0540 12/31/21  0801 12/31/21  0547 12/30/21  1203   SODIUM 146* 143 142 143  --  142   POTASSIUM 3.8 4.0 4.8 4.1  --  4.0   CHLORIDE 102 101 98 104  --  104   CO2 30.6* 28.2 26.1 25.3  --  25.6   ANION GAP 13.4 13.8 17.9* 13.7  --  12.4   BUN 92* 91* 80* 67*  --  68*   CREATININE 2.16* 1.96* 2.08* 1.87*  --  1.75*   GLUCOSE 131* 245* 191* 164*  --  135*   CALCIUM 8.3 8.3 8.8 9.1  --  9.3   MAGNESIUM 2.0 2.2 2.2  --  2.3  --    PHOSPHORUS 3.6 4.7* 5.3*  --  5.0*  --          Lab 01/03/22  0426 01/02/22  0614 01/01/22  0540 12/31/21  0801 12/30/21  1203   TOTAL PROTEIN 6.2 6.5 6.6 7.1 7.2   ALBUMIN 3.70 3.70 3.80 4.00 4.30   GLOBULIN  --  2.8  --   --  2.9   ALT (SGPT) 10  11 13 11 10   AST (SGOT) 10 10 16 16 17   BILIRUBIN <0.2 <0.2 <0.2 0.2 0.6   BILIRUBIN DIRECT <0.2 <0.2 <0.2 <0.2  --    ALK PHOS 87 94 96 107 101         Lab 01/01/22  0540 12/30/21  1734 12/30/21  1203   PROBNP 14,327.0*  --  21,518.0*   TROPONIN T  --  0.087* 0.151*             Lab 01/02/22  0614   FERRITIN 316.10*         Brief Urine Lab Results  (Last result in the past 365 days)      Color   Clarity   Blood   Leuk Est   Nitrite   Protein   CREAT   Urine HCG        12/31/21 0627 Yellow   Clear   Small (1+)   Small (1+)   Negative   Negative               Microbiology Results (last 10 days)     Procedure Component Value - Date/Time    Respiratory Culture - Sputum, Cough [[id number]] Collected: 01/01/22 0530    Lab Status: Preliminary result Specimen: Sputum from Cough Updated: 01/02/22 1211     Respiratory Culture Scant growth (1+) The culture consists of normal respiratory marilynn. This is a preliminary report; final report to follow.     Gram Stain Many (4+) WBCs seen    Urine Culture - Urine, Urine, Clean Catch [[id number]]  (Abnormal) Collected: 12/31/21 0627    Lab Status: Final result Specimen: Urine, Clean Catch Updated: 01/01/22 1125     Urine Culture <10,000 CFU/mL Gram Negative Bacilli    Narrative:      Call if further workup needed.      Respiratory Culture - Sputum, Cough [[id number]] Collected: 12/30/21 2057    Lab Status: Final result Specimen: Sputum from Cough Updated: 01/01/22 1143     Respiratory Culture Moderate growth (3+) Normal respiratory marilynn. No S. aureus or Pseudomonas aeruginosa detected. Final report.     Gram Stain Less than 10 Epithelial cells per low power field      Greater than 25 WBCs per low power field      Few (2+) Gram positive cocci in pairs, chains and clusters      Few (2+) Budding yeast with hyphae seen    Mycoplasma Pneumoniae Antibody, IgM - Blood, Arm, Left [[id number]]  (Normal) Collected: 12/30/21 1923    Lab Status: Final result Specimen: Blood from Arm, Left Updated:  12/30/21 2022     Mycoplasma pneumo IgM Negative    COVID-19,APTIMA PANTHER(ROSANNA),[facility], NP/OP SWAB IN UTM/VTM/SALINE TRANSPORT MEDIA,24 HR TAT - Swab, Nasopharynx [[id number]]  (Normal) Collected: 12/30/21 1917    Lab Status: Final result Specimen: Swab from Nasopharynx Updated: 12/31/21 0105     COVID19 Not Detected    Narrative:      Fact sheet for providers: https://www.fda.gov/media/555799/download     Fact sheet for patients: https://www.fda.gov/media/483499/download    Test performed by RT PCR.    S. Pneumo Ag Urine or CSF - Urine, Urine, Clean Catch [[id number]]  (Abnormal) Collected: 12/30/21 1712    Lab Status: Final result Specimen: Urine, Clean Catch Updated: 12/30/21 1803     Strep Pneumo Ag Positive    Legionella Antigen, Urine - Urine, Urine, Clean Catch [[id number]]  (Normal) Collected: 12/30/21 1712    Lab Status: Final result Specimen: Urine, Clean Catch Updated: 12/30/21 1802     LEGIONELLA ANTIGEN, URINE Negative    Blood Culture - Blood, Arm, Right [[id number]]  (Normal) Collected: 12/30/21 1203    Lab Status: Preliminary result Specimen: Blood from Arm, Right Updated: 01/02/22 1215     Blood Culture No growth at 3 days    Blood Culture - Blood, Blood, Venous Line [[id number]]  (Normal) Collected: 12/30/21 1203    Lab Status: Preliminary result Specimen: Blood, Venous Line Updated: 01/02/22 1215     Blood Culture No growth at 3 days                       Results for orders placed during the hospital encounter of 12/30/21    Adult Transthoracic Echo Complete W/ Cont if Necessary Per Protocol    Interpretation Summary  · The left ventricular cavity is moderately dilated.  · Left ventricular wall thickness is consistent with mild concentric hypertrophy.  · Left ventricular ejection fraction appears to be 36 - 40%.  · Left ventricular diastolic function is consistent with (grade II w/high LAP) pseudonormalization.  · The right ventricular cavity is mildly dilated.  · Mildly reduced right  ventricular systolic function noted.  · The right atrial cavity is mildly dilated.  · Estimated right ventricular systolic pressure from tricuspid regurgitation is moderately elevated (45-55 mmHg).      Labs Pending at Discharge:  Pending Labs     Order Current Status    Blood Culture - Blood, Arm, Right Preliminary result    Blood Culture - Blood, Blood, Venous Line Preliminary result    Respiratory Culture - Sputum, Cough Preliminary result            Time spent on Discharge including face to face service:  38 minutes    Electronically signed by Faith Raygoza MD, 01/03/22, 8:48 AM EST.

## 2022-01-04 PROBLEM — Z99.89 DEPENDENT ON WALKER FOR AMBULATION: Status: ACTIVE | Noted: 2022-01-01

## 2022-01-04 NOTE — OUTREACH NOTE
Call Center TCM Note      Responses   [facility] patient discharged from? [facility]   Does the patient have one of the following disease processes/diagnoses(primary or secondary)? CHF   TCM attempt successful? Yes  [Patient was seen in the office today 1/4/2022 by PCP. This fulfils the TCM requirement. ]   Call start time [id number]   Discharge diagnosis A/C hypoxic resp failure, Acute CHF, PNA, COPD acute exacerbation, DM           Breezy Shannon RN    1/4/2022, 16:41 EST

## 2022-01-04 NOTE — PROGRESS NOTES
Chief Complaint    Pneumonia and Congestive Heart Failure    Subjective      Burnmigel Ledbetter presents to [facility]     History of Present Illness    1.) HOSPITAL DISCHARGE ([facility]) : Discharge summary reviewed. Patient admitted for acute on chronic heart failure + CAP. Presents today with complaints of sternal pain. Reports bizarre taste in mouth. ? History of GERD. Per patient - taking Gas X - no relief. No complaints of fevers or chills. No pleuritic chest pain. Completing PO course of abx + diuretic. Scheduled to follow up with nephrology + cardiology in 1 - 2 weeks.     Objective      Vital Signs:     /64   Pulse 79   Temp 97.1 °F (36.2 °C)   Wt 60.8 kg (134 lb)   SpO2 93%   BMI 27.06 kg/m²       Physical Exam  Vitals reviewed.   Constitutional:       General: She is not in acute distress.     Appearance: Normal appearance. She is well-developed.   HENT:      Head: Normocephalic and atraumatic.      Right Ear: Hearing and external ear normal.      Left Ear: Hearing and external ear normal.      Nose: Nose normal.   Eyes:      General: Lids are normal.         Right eye: No discharge.         Left eye: No discharge.      Conjunctiva/sclera: Conjunctivae normal.   Pulmonary:      Effort: Pulmonary effort is normal.      Breath sounds: Examination of the right-upper field reveals rhonchi. Examination of the left-upper field reveals rhonchi. Examination of the right-middle field reveals rhonchi. Examination of the left-middle field reveals rhonchi. Examination of the right-lower field reveals rhonchi. Examination of the left-lower field reveals rhonchi. Rhonchi present.   Abdominal:      General: There is no distension.   Musculoskeletal:         General: No swelling.      Cervical back: Neck supple.   Skin:     Coloration: Skin is not jaundiced.      Findings: No erythema.   Neurological:      Mental Status: She is alert. Mental status is at baseline.    Psychiatric:         Mood and Affect: Mood and affect normal.         Thought Content: Thought content normal.     Assessment and Plan     Diagnoses and all orders for this visit:    1. Hospital discharge follow-up (Primary)  Comments:  1.) Awaiting discharge summary - will review.     2. Community acquired pneumonia, unspecified laterality  Comments:  1.) Finish course of abx - repeat x-ray in 1 week.    3. Chronic obstructive pulmonary disease, unspecified COPD type (HCC)  Comments:  1.) Continue current rx - follow up scheduled with pulmonology.    4. CRF (chronic renal failure), stage 4 (severe) ([facility])  Comments:  1.) Patient will follow up with nephrology in 1 week.     5. Chronic respiratory failure with hypoxia ([facility])  Comments:  1.) Continue diuretic - follow up with cardiology in 2 weeks.     Follow Up     Return in about 3 weeks (around 1/25/2022) for hospital discharge - CAP, CHF.     Patient was given instructions and counseling regarding her condition or for health maintenance advice. Please see specific information pulled into the AVS if appropriate.

## 2022-01-04 NOTE — OUTREACH NOTE
Prep Survey      Responses   [facility]? [facility]   Is LACE score < 7 ? No   Emergency Room discharge w/ pulse ox? No   Eligibility TCM   [facility]   Date of Admission 12/30/21   Date of Discharge 01/03/22   Discharge Disposition Home-Health Care [facility]   Discharge diagnosis A/C hypoxic resp failure, Acute CHF, PNA, COPD acute exacerbation, DM   Does the patient have one of the following disease processes/diagnoses(primary or secondary)? CHF   Does the patient have Home health ordered? Yes   What is the Home health agency?  [facility]    Is there a DME ordered? No   Prep survey completed? Yes          Hannah Hernandez RN

## 2022-01-10 NOTE — TELEPHONE ENCOUNTER
Caller: APRIL    Relationship to patient: Home Health    Best call back number: [phone]    Patient is needing: [facility] IS WANTING ORDERS FOR THE ABILITY TO DO TELEHEALTH ORDERS WITH PATIENT SO THEY CAN CHECK  HER WEIGHT, OXYGEN, HEART RATE AND BLOOD PRESSURE REMOTELY. PATIENT HAS SOME SWEELING IN HER LOWER EXTREMITIES AND [facility] WANTS TO KEEP A CLOSER EYE ON HER WITH HER CONDITION.

## 2022-01-12 NOTE — TELEPHONE ENCOUNTER
Caller: JOSE PEARL     Relationship: [facility]     Best call back number: [phone]     What is the best time to reach you: ANYTIME     Who are you requesting to speak with (clinical staff, provider,  specific staff member):     Jeni Pope, DO    What was the call regarding: PT IS NEEDING, A ORDER FOR PT AS FOLLOWS  -ONCE A WEEK FOR THE FIRST WEEK  -TWICE  A WEEK FOR TWO WEEKS  -ONCE A WEEK FOR ONE WEEK     Do you require a callback: YES PLEASE ADVISE

## 2022-01-13 NOTE — OUTREACH NOTE
CHF Week 2 Survey      Responses   [facility] patient discharged from? [facility]   Does the patient have one of the following disease processes/diagnoses(primary or secondary)? CHF   Week 2 attempt successful? No   Unsuccessful attempts Attempt 1          Maria D Atkins RN

## 2022-01-18 NOTE — OUTREACH NOTE
CHF Week 2 Survey      Responses   [facility] patient discharged from? [facility]   Does the patient have one of the following disease processes/diagnoses(primary or secondary)? CHF   Week 2 attempt successful? No   Rescheduled Revoked   Revoke Readmitted  [Admitted to [facility] on 1/16/21 per note in chart]          Joslyn Allen RN

## 2022-01-24 NOTE — TELEPHONE ENCOUNTER
Received VM from patients niece regarding patient currently in [facility] there was wanting to do life vest however they refused and was wanting to know about ICD.    CASTILLO Bhatti and per Dr Shultz patient needed med management. ICD not recommended due to age and disease.     CASTILLO vazquez. Niece to call when patient gets DC to get F/U appointment with Magy and request records from [facility].

## 2022-01-28 NOTE — TELEPHONE ENCOUNTER
Caller: [facility]     Relationship to Patient:        Phone Number: [phone]    Reason for Call:      [facility] ADVISED THE PATIENT WAS RELEASED FROM THE HOSPITAL 1/28/22 AND THEY WILL BE RESUMING HOME HEALTH

## 2022-01-31 NOTE — PROGRESS NOTES
[facility] - PODIATRY    Today's Date: 02/02/22    Patient Name: Carrington Ledbetter  MRN: [id number]  CSN: [id number]  PCP: Jeni Pope DO, last PCP visit: 28 January 2022  Referring Provider: No ref. provider found    SUBJECTIVE     Chief Complaint   Patient presents with   • Left Foot - Nail Problem   • Right Foot - Nail Problem     HPI: Carrington Ledbetter, a 89 y.o.female, comes to clinic for painful toenails.    New, Established, New Problem:  established     Location:  Toenails    Duration:   Greater than five years    Onset:  Gradual    Nature:  sore with palpation.    Stable, worsening, improving:   Stable    Aggravating factors:  Pain with shoe gear and ambulation.    Previous Treatment:  debridement    Onset:  gradual    Nature:   NIDDM     Stable, worsening, improving:  stable  __________________________________    Medical changes: Hospital admission due to renal and cardiac issues    No other pedal complaints at this time.    Patient denies any fevers, chills, nausea, vomiting, shortness of breathe, nor any other constitutional signs nor symptoms.       Past Medical History:   Diagnosis Date   • Arthritis    • Chronic HFrEF (heart failure with reduced ejection fraction) 9/14/2021   • Dialysis patient ([facility])    • DM type 2 (diabetes mellitus, type 2) ([facility])    • ESRD (end stage renal disease) on dialysis ([facility]) 9/14/2021   • Essential hypertension    • Oxygen dependent      Past Surgical History:   Procedure Laterality Date   • APPENDECTOMY     • CHOLECYSTECTOMY     • HYSTERECTOMY     • INSERT / REPLACE / REMOVE PACEMAKER     • PACEMAKER IMPLANTATION       Family History   Problem Relation Age of Onset   • Stroke Mother    • Heart disease Mother    • Diabetes type II Mother    • Heart disease Brother    • Diabetes type II Brother      Social History     Socioeconomic History   • Marital status:    Tobacco Use   • Smoking status: Former Smoker     Packs/day: 2.00     Types: Cigarettes   •  Smokeless tobacco: Never Used   • Tobacco comment: quit at age 60   Vaping Use   • Vaping Use: Never used   Substance and Sexual Activity   • Alcohol use: Never   • Drug use: Never   • Sexual activity: Defer     No Known Allergies  Current Outpatient Medications   Medication Sig Dispense Refill   • albuterol sulfate  (90 Base) MCG/ACT inhaler Inhale 2 puffs Every 6 (Six) Hours As Needed.     • aspirin (aspirin) 81 MG EC tablet Take 81 mg by mouth Daily.     • calcitriol (ROCALTROL) 0.25 MCG capsule Take 0.25 mcg by mouth Daily.     • carvedilol (COREG) 6.25 MG tablet Take 1 tablet by mouth 2 (Two) Times a Day With Meals. Take 2 (two) pills once a day 180 tablet 3   • ergocalciferol (ERGOCALCIFEROL) 1.25 MG (02581 UT) capsule Take 50,000 Units by mouth 1 (One) Time Per Week.     • esomeprazole (nexIUM) 40 MG capsule Take 1 capsule by mouth Every Morning Before Breakfast for 90 days. 90 capsule 3   • ferrous sulfate 325 (65 FE) MG tablet Take 325 mg by mouth Daily With Breakfast.     • furosemide (LASIX) 20 MG tablet Take 1 tablet by mouth Daily for 30 days. 30 tablet 0   • insulin lispro (humaLOG) 100 UNIT/ML injection Inject  under the skin into the appropriate area as directed.     • Klor-Con 8 MEQ CR tablet TAKE 1 TABLET DAILY 30 tablet 0   • levalbuterol (XOPENEX) 1.25 MG/3ML nebulizer solution INHALE THE CONTENTS OF 1   VIAL VIA NEBULIZER EVERY 4 HOURS AS NEEDED FOR        SHORTNESS OF BREATH (Patient taking differently: Take 1 ampule by nebulization Every 4 (Four) Hours As Needed.) 900 mL 0   • losartan (COZAAR) 25 MG tablet Take 1 tablet by mouth Daily. 90 tablet 3   • metOLazone (ZAROXOLYN) 2.5 MG tablet Take 2.5 mg by mouth.     • pantoprazole (PROTONIX) 40 MG EC tablet Take 40 mg by mouth 2 (Two) Times a Day.     • Fluticasone-Umeclidin-Vilant (Trelegy Ellipta) 200-62.5-25 MCG/INH inhaler Inhale 1 puff Daily for 90 days. Rinse mouth out after each use. 3 each 3     No current facility-administered  medications for this visit.     Review of Systems   Constitutional: Negative.    Skin:        Painful toenails bilaterally   All other systems reviewed and are negative.      OBJECTIVE     Vitals:    22 1102   BP: 100/68   Pulse: 96   Temp: 97 °F (36.1 °C)   SpO2: 95%       Lab Results   Component Value Date    HGBA1C 6.1 (H) 2021       Lab Results   Component Value Date    GLUCOSE 131 (H) 2022    CALCIUM 8.3 2022     (H) 2022    K 3.8 2022    CO2 30.6 (H) 2022     2022    BUN 92 (H) 2022    CREATININE 2.16 (H) 2022    EGFRIFAFRI 26 (L) 2022    BCR 42.6 (H) 2022    ANIONGAP 13.4 2022       Patient seen in no apparent distress.      PHYSICAL EXAM:     Foot/Ankle Exam:       General:   Diabetic Foot Exam Performed    Appearance: elderly    Appearance comment:  Utilizing nasal oxygen  Orientation: AAOx3    Affect: appropriate    Assistance: walker    Shoe Gear:  Casual shoes    VASCULAR      Right Foot Vascularity   Normal vascular exam    Dorsalis pedis:  2+  Posterior tibial:  2+  Skin Temperature: warm    Edema Gradin+ and pitting  CFT:  < 3 seconds  Pedal Hair Growth:  Absent  Varicosities: none       Left Foot Vascularity   Normal vascular exam    Dorsalis pedis:  2+  Posterior tibial:  2+  Skin Temperature: warm    Edema Gradin+ and pitting  CFT:  < 3 seconds  Pedal Hair Growth:  Absent  Varicosities: none        NEUROLOGIC     Right Foot Neurologic   Normal sensation    Light touch sensation:  Normal  Vibratory sensation:  Normal  Hot/Cold sensation: normal       Left Foot Neurologic   Normal sensation    Light touch sensation:  Normal  Vibratory sensation:  Normal  Hot/cold sensation: normal       MUSCLE STRENGTH     Right Foot Muscle Strength   Foot dorsiflexion:  4  Foot plantar flexion:  4  Foot inversion:  4  Foot eversion:  4     Left Foot Muscle Strength   Foot dorsiflexion:  4  Foot plantar flexion:   4  Foot inversion:  4  Foot eversion:  4     DERMATOLOGIC     Right Foot Dermatologic   Skin: skin intact    Nails: onychomycosis, abnormally thick, subungual debris, dystrophic nails and ingrown toenail    Nails comment:  Toenails 1, 2, 3, and 5     Left Foot Dermatologic   Skin: skin intact    Nails: onychomycosis, abnormally thick, subungual debris, dystrophic nails and ingrown toenail    Nails comment:  Toenails 1, 2, 3, and 5      ASSESSMENT/PLAN     Diagnoses and all orders for this visit:    1. Foot pain, bilateral (Primary)    2. Onychomycosis    3. Onychocryptosis    4. Diabetic foot (HCC)    5. Non-insulin dependent type 2 diabetes mellitus (HCC)    6. Difficulty walking        Comprehensive lower extremity examination and evaluation was performed.    Discussed findings and treatment plan including risks, benefits, and treatment options with patient in detail. Patient agreed with treatment plan.    Toenails 1, 2, 3, 5 bilaterally were debrided in thickness and length and then smoothed with a Dremel Tool.  Tolerated the procedure well without complications.    Diabetic foot exam performed and documented this date, compliant with CQM required standards. Detail of findings as noted in physical exam.  Lower extremity Neurologic exam for diabetic patient performed and documented this date, compliant with PQRS required standards. Detail of findings as noted in physical exam.  Advised patient importance of good routine lower extremity hygiene. Advised patient importance of evaluating for intact skin and pain free nail borders.  Advised patient to use mirror to evaluate plantar/ soles of feet for better visualization. Advised patient monitor and phone office to be seen if any cracking to skin, open lesions, painful nail borders or if nails become elongated prior to next visit. Advised patient importance of daily cleansing of lower extremities, followed by good skin cream to maintain normal hydration of skin. Also  advised patient importance of close daily monitoring of blood sugar. Advised to regulate diet and medications to maintain control of blood sugar in optimal range. Contact primary care provider if difficulties maintaining blood sugar levels.  Advised Patient of presence of Diabetes Mellitus condition.  Advised Patient risk of progression and worsening or improvement, then return of condition.  Will monitor condition for any change in future. Treat with most appropriate treatment pending status of condition.  Counseled and advised patient extensively on nature and ramifications of diabetes. Standard instructions given to patient for good diabetic foot care and maintenance. Advised importance of careful monitoring to avoid break down and complications secondary to diabetes. Advised patient importance of strict maintenance of blood sugar control. Advised patient of possible ominous results from neglect of condition, i.e.: amputation/ loss of digits, feet and legs, or even death.  Patient states understands counseling, will monitor closely, continue good hygiene and routine diabetic foot care. Patient will contact office is questions or problems.      An After Visit Summary was printed and given to the patient at discharge, including (if requested) any available informative/educational handouts regarding diagnosis, treatment, or medications. All questions were answered to patient/family satisfaction. Should symptoms fail to improve or worsen they agree to call or return to clinic or to go to the Emergency Department. Discussed the importance of following up with any needed screening tests/labs/specialist appointments and any requested follow-up recommended by me today. Importance of maintaining follow-up discussed and patient accepts that missed appointments can delay diagnosis and potentially lead to worsening of conditions.    Return in about 9 weeks (around 4/4/2022) for Toenail Care., or sooner if acute issues  arise.    This document has been electronically signed by Cosmo Garcia DPM on February 2, 2022 14:34 EST

## 2022-02-02 NOTE — TELEPHONE ENCOUNTER
Caller: JANY PULIDO RN     Relationship: RN     Best call back number: [phone]      What is the best time to reach you: ANYTIME     Who are you requesting to speak with (clinical staff, provider,  specific staff member): CLINICAL       What was the call regarding: ELMIRA CALLING WITH PATIENT UPDATES, SKILLED NURSING VISIT TWICE A WEEK.     Do you require a callback: YES

## 2022-02-07 PROBLEM — F17.201 TOBACCO ABUSE, IN REMISSION: Status: RESOLVED | Noted: 2021-01-01 | Resolved: 2022-01-01

## 2022-02-07 NOTE — PATIENT INSTRUCTIONS
"Low-Sodium Eating Plan  Sodium, which is an element that makes up salt, helps you maintain a healthy balance of fluids in your body. Too much sodium can increase your blood pressure and cause fluid and waste to be held in your body.  Your health care provider or dietitian may recommend following this plan if you have high blood pressure (hypertension), kidney disease, liver disease, or heart failure. Eating less sodium can help lower your blood pressure, reduce swelling, and protect your heart, liver, and kidneys.  What are tips for following this plan?  Reading food labels  · The Nutrition Facts label lists the amount of sodium in one serving of the food. If you eat more than one serving, you must multiply the listed amount of sodium by the number of servings.  · Choose foods with less than 140 mg of sodium per serving.  · Avoid foods with 300 mg of sodium or more per serving.  Shopping    · Look for lower-sodium products, often labeled as \"low-sodium\" or \"no salt added.\"  · Always check the sodium content, even if foods are labeled as \"unsalted\" or \"no salt added.\"  · Buy fresh foods.  ? Avoid canned foods and pre-made or frozen meals.  ? Avoid canned, cured, or processed meats.  · Buy breads that have less than 80 mg of sodium per slice.    Cooking    · Eat more home-cooked food and less restaurant, buffet, and fast food.  · Avoid adding salt when cooking. Use salt-free seasonings or herbs instead of table salt or sea salt. Check with your health care provider or pharmacist before using salt substitutes.  · Cook with plant-based oils, such as canola, sunflower, or olive oil.    Meal planning  · When eating at a restaurant, ask that your food be prepared with less salt or no salt, if possible. Avoid dishes labeled as brined, pickled, cured, smoked, or made with soy sauce, miso, or teriyaki sauce.  · Avoid foods that contain MSG (monosodium glutamate). MSG is sometimes added to Chinese food, bouillon, and some " "canned foods.  · Make meals that can be grilled, baked, poached, roasted, or steamed. These are generally made with less sodium.  General information  Most people on this plan should limit their sodium intake to 1,500-2,000 mg (milligrams) of sodium each day.  What foods should I eat?  Fruits  Fresh, frozen, or canned fruit. Fruit juice.  Vegetables  Fresh or frozen vegetables. \"No salt added\" canned vegetables. \"No salt added\" tomato sauce and paste. Low-sodium or reduced-sodium tomato and vegetable juice.  Grains  Low-sodium cereals, including oats, puffed wheat and rice, and shredded wheat. Low-sodium crackers. Unsalted rice. Unsalted pasta. Low-sodium bread. Whole-grain breads and whole-grain pasta.  Meats and other proteins  Fresh or frozen (no salt added) meat, poultry, seafood, and fish. Low-sodium canned tuna and salmon. Unsalted nuts. Dried peas, beans, and lentils without added salt. Unsalted canned beans. Eggs. Unsalted nut butters.  Dairy  Milk. Soy milk. Cheese that is naturally low in sodium, such as ricotta cheese, fresh mozzarella, or Swiss cheese. Low-sodium or reduced-sodium cheese. Cream cheese. Yogurt.  Seasonings and condiments  Fresh and dried herbs and spices. Salt-free seasonings. Low-sodium mustard and ketchup. Sodium-free salad dressing. Sodium-free light mayonnaise. Fresh or refrigerated horseradish. Lemon juice. Vinegar.  Other foods  Homemade, reduced-sodium, or low-sodium soups. Unsalted popcorn and pretzels. Low-salt or salt-free chips.  The items listed above may not be a complete list of foods and beverages you can eat. Contact a dietitian for more information.  What foods should I avoid?  Vegetables  Sauerkraut, pickled vegetables, and relishes. Olives. French fries. Onion rings. Regular canned vegetables (not low-sodium or reduced-sodium). Regular canned tomato sauce and paste (not low-sodium or reduced-sodium). Regular tomato and vegetable juice (not low-sodium or reduced-sodium). " Frozen vegetables in sauces.  Grains  Instant hot cereals. Bread stuffing, pancake, and biscuit mixes. Croutons. Seasoned rice or pasta mixes. Noodle soup cups. Boxed or frozen macaroni and cheese. Regular salted crackers. Self-rising flour.  Meats and other proteins  Meat or fish that is salted, canned, smoked, spiced, or pickled. Precooked or cured meat, such as sausages or meat loaves. Bhatti. Ham. Pepperoni. Hot dogs. Corned beef. Chipped beef. Salt pork. Jerky. Pickled herring. Anchovies and sardines. Regular canned tuna. Salted nuts.  Dairy  Processed cheese and cheese spreads. Hard cheeses. Cheese curds. Blue cheese. Feta cheese. String cheese. Regular cottage cheese. Buttermilk. Canned milk.  Fats and oils  Salted butter. Regular margarine. Ghee. Bhatti fat.  Seasonings and condiments  Onion salt, garlic salt, seasoned salt, table salt, and sea salt. Canned and packaged gravies. Worcestershire sauce. Tartar sauce. Barbecue sauce. Teriyaki sauce. Soy sauce, including reduced-sodium. Steak sauce. Fish sauce. Oyster sauce. Cocktail sauce. Horseradish that you find on the shelf. Regular ketchup and mustard. Meat flavorings and tenderizers. Bouillon cubes. Hot sauce. Pre-made or packaged marinades. Pre-made or packaged taco seasonings. Relishes. Regular salad dressings. Salsa.  Other foods  Salted popcorn and pretzels. Corn chips and puffs. Potato and tortilla chips. Canned or dried soups. Pizza. Frozen entrees and pot pies.  The items listed above may not be a complete list of foods and beverages you should avoid. Contact a dietitian for more information.  Summary  · Eating less sodium can help lower your blood pressure, reduce swelling, and protect your heart, liver, and kidneys.  · Most people on this plan should limit their sodium intake to 1,500-2,000 mg (milligrams) of sodium each day.  · Canned, boxed, and frozen foods are high in sodium. Restaurant foods, fast foods, and pizza are also very high in sodium.  You also get sodium by adding salt to food.  · Try to cook at home, eat more fresh fruits and vegetables, and eat less fast food and canned, processed, or prepared foods.  This information is not intended to replace advice given to you by your health care provider. Make sure you discuss any questions you have with your health care provider.  Document Revised: 01/22/2021 Document Reviewed: 11/18/2020  [employer] Patient Education © 2021 [employer]

## 2022-02-07 NOTE — PROGRESS NOTES
Chief Complaint  Congestive Heart Failure    Subjective            History of Present Illness  Carrington Ledbetter is a 89-year-old -American female patient who presents to the office today for hospital follow-up.  She was admitted to [facility] in from 12/30/2021 to 1/3/2022 for acute on chronic hypoxemic respiratory failure, acute decompensation of CHF, and community-acquired pneumonia.  She has end-stage renal disease and is on dialysis every Tuesday Thursday and Saturday.  During her hospitalization she received IV diuretic therapy and treatment with antibiotics for the pneumonia.  While hospitalized she had some runs of SVT.  Upon discharge she was given new prescription for Lasix 20 mg daily.  She was instructed to follow-up with cardiology after her discharge.  Today she reports compliance with all her medications.  She reports some symptoms of dizziness with position change.  She denies any chest pain, worsening shortness of breath, palpitations, or edema.    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         ALLERGY  No Known Allergies       SURGICALHX  Past Surgical History:   Procedure Laterality Date   • APPENDECTOMY     • CHOLECYSTECTOMY     • HYSTERECTOMY     • INSERT / REPLACE / REMOVE PACEMAKER     • PACEMAKER IMPLANTATION            SOC  Social History     Socioeconomic History   • Marital status:    Tobacco Use   • Smoking status: Former Smoker     Packs/day: 2.00     Types: Cigarettes   • Smokeless tobacco: Never Used   • Tobacco comment: quit at age 60   Vaping Use   • Vaping Use: Never used   Substance and Sexual Activity   • Alcohol use: Never   • Drug use:  Never   • Sexual activity: Defer         FAMHX  Family History   Problem Relation Age of Onset   • Stroke Mother    • Heart disease Mother    • Diabetes type II Mother    • Heart disease Brother    • Diabetes type II Brother           MEDSIGONLY  Current Outpatient Medications on File Prior to Visit   Medication Sig   • albuterol sulfate  (90 Base) MCG/ACT inhaler Inhale 2 puffs Every 6 (Six) Hours As Needed.   • aspirin (aspirin) 81 MG EC tablet Take 81 mg by mouth Daily.   • B Complex-C-Folic Acid (Virt-Caps) 1 MG capsule Take 1 capsule by mouth Daily.   • calcitriol (ROCALTROL) 0.25 MCG capsule Take 0.25 mcg by mouth Daily.   • carvedilol (COREG) 6.25 MG tablet Take 1 tablet by mouth 2 (Two) Times a Day With Meals. Take 2 (two) pills once a day   • ergocalciferol (ERGOCALCIFEROL) 1.25 MG (35446 UT) capsule Take 50,000 Units by mouth 1 (One) Time Per Week.   • esomeprazole (nexIUM) 40 MG capsule Take 1 capsule by mouth Every Morning Before Breakfast for 90 days.   • ferrous sulfate 325 (65 FE) MG tablet Take 325 mg by mouth Daily With Breakfast.   • Fluticasone-Umeclidin-Vilant (Trelegy Ellipta) 200-62.5-25 MCG/INH inhaler Inhale 1 puff Daily for 90 days. Rinse mouth out after each use.   • furosemide (LASIX) 20 MG tablet Take 1 tablet by mouth Daily for 30 days. (Patient taking differently: Take 40 mg by mouth Daily.)   • insulin lispro (humaLOG) 100 UNIT/ML injection Inject  under the skin into the appropriate area as directed.   • Klor-Con 8 MEQ CR tablet TAKE 1 TABLET DAILY   • levalbuterol (XOPENEX) 1.25 MG/3ML nebulizer solution INHALE THE CONTENTS OF 1   VIAL VIA NEBULIZER EVERY 4 HOURS AS NEEDED FOR        SHORTNESS OF BREATH (Patient taking differently: Take 1 ampule by nebulization Every 4 (Four) Hours As Needed.)   • losartan (COZAAR) 25 MG tablet Take 1 tablet by mouth Daily.   • metOLazone (ZAROXOLYN) 2.5 MG tablet Take 2.5 mg by mouth.   • pantoprazole (PROTONIX) 40 MG EC tablet Take 40 mg by  "mouth 2 (Two) Times a Day.     No current facility-administered medications on file prior to visit.         Objective   /49   Pulse 71   Ht 149.9 cm (59\")   Wt 57.6 kg (127 lb)   BMI 25.65 kg/m²       Physical Exam  HENT:      Head: Normocephalic.   Neck:      Vascular: No carotid bruit.   Cardiovascular:      Rate and Rhythm: Normal rate and regular rhythm.      Pulses: Normal pulses.      Heart sounds: Normal heart sounds. No murmur heard.      Pulmonary:      Effort: Pulmonary effort is normal.      Breath sounds: Normal breath sounds.   Musculoskeletal:      Cervical back: Neck supple.      Right lower leg: No edema.      Left lower leg: No edema.   Skin:     General: Skin is dry.      Capillary Refill: Capillary refill takes less than 2 seconds.   Neurological:      Mental Status: She is alert and oriented to person, place, and time.   Psychiatric:         Behavior: Behavior normal.       Result Review :   The following data was reviewed by: SIDNEY Castaneda on 02/07/2022:  proBNP   Date Value Ref Range Status   01/01/2022 14,327.0 (H) 0.0-1,800.0 pg/mL Final     CMP    CMP 1/3/22 1/3/22    0426 0426   Glucose  131 (A)   BUN  92 (A)   Creatinine  2.16 (A)   eGFR African Am  26 (A)   Sodium  146 (A)   Potassium  3.8   Chloride  102   Calcium  8.3   Albumin 3.70    Total Bilirubin <0.2    Alkaline Phosphatase 87    AST (SGOT) 10    ALT (SGPT) 10    (A) Abnormal value       Comments are available for some flowsheets but are not being displayed.           CBC w/diff    CBC w/Diff 1/3/22   WBC 9.08   RBC 3.96   Hemoglobin 10.9 (A)   Hematocrit 34.7   MCV 87.6   MCH 27.5   MCHC 31.4 (A)   RDW 15.8 (A)   Platelets 227   Neutrophil Rel % 68.3   Immature Granulocyte Rel % 2.2 (A)   Lymphocyte Rel % 18.4 (A)   Monocyte Rel % 10.9   Eosinophil Rel % 0.0 (A)   Basophil Rel % 0.2   (A) Abnormal value             No results found for: TSH   No results found for: FREET4   No results found for: " DDIMERQUANT  Magnesium   Date Value Ref Range Status   01/03/2022 2.0 1.7 - 2.3 mg/dL Final      No results found for: DIGOXIN   Lab Results   Component Value Date    TROPONINT 0.087 (C) 12/30/2021           Lipid Panel    Lipid Panel 5/17/21   Total Cholesterol 196   Triglycerides 75   HDL Cholesterol 82 (A)   VLDL Cholesterol 15   LDL Cholesterol  99   (A) Abnormal value       Comments are available for some flowsheets but are not being displayed.             Results for orders placed during the hospital encounter of 12/30/21    Adult Transthoracic Echo Complete W/ Cont if Necessary Per Protocol    Interpretation Summary  · The left ventricular cavity is moderately dilated.  · Left ventricular wall thickness is consistent with mild concentric hypertrophy.  · Left ventricular ejection fraction appears to be 36 - 40%.  · Left ventricular diastolic function is consistent with (grade II w/high LAP) pseudonormalization.  · The right ventricular cavity is mildly dilated.  · Mildly reduced right ventricular systolic function noted.  · The right atrial cavity is mildly dilated.  · Estimated right ventricular systolic pressure from tricuspid regurgitation is moderately elevated (45-55 mmHg).       Assessment and Plan    Diagnoses and all orders for this visit:    1. Chronic HFrEF (heart failure with reduced ejection fraction) (Primary)  Symptomatically stable at this time and euvolemic on exam.  Continue carvedilol 6.25 mg twice daily, Lasix 40 mg daily, potassium 8 mEq daily, losartan 25 mg daily, and metolazone 2.5 mg daily as needed.    2. Presence of cardiac pacemaker dual chamber  Device was checked in office today: Normal Dual Chamber Pacemaker device interrogation and testing. Normal evaluation of device function and lead measurements.  No optimization was needed of parameters or maximization of device longevity.  Remaining battery is 80%.  3 Short Atrial tachycardia episodes that were less than 1 minute.    3.  Essential hypertension  Currently controlled and without adverse effects from medication, continue current regimen.  Low-sodium diet discussed.    Addendum  Spoke with SIDNEY Chowdhury with nephrology today and it has been decided that they will reassess her sodium level and may be able to resolve her dizziness with electrolyte correction during dialysis.  Was decided that she would stay on Lasix for now since it is managing her weight well      Follow Up   Return for Keep May follow up with Dr Shultz ADD ON DEVICE CHECK.    Patient was given instructions and counseling regarding her condition or for health maintenance advice. Please see specific information pulled into the AVS if appropriate.     Carrington Ledbetter  reports that she has quit smoking. Her smoking use included cigarettes. She smoked 2.00 packs per day. She has never used smokeless tobacco.         SIDNEY Castaneda  02/07/22  09:26 EST    Dictated Utilizing Dragon Dictation

## 2022-02-07 NOTE — PROGRESS NOTES
Normal Dual Chamber Pacemaker device interrogation and testing.   Normal evaluation of device function and lead measurements.  No optimization was needed of parameters or maximization of device longevity.  Remaining battery is 80%.  3 Short Atrial Fibrillation episodes that were less than One minute.

## 2022-02-16 NOTE — TELEPHONE ENCOUNTER
Caller: JOSE    Relationship: [facility]    Best call back number: [phone]    What is the best time to reach you: ANYTIME    Who are you requesting to speak with (clinical staff, provider,  specific staff member): CLINICAL      What was the call regarding: JOSE FROM [facility] STATED THEY PERFORMED AN EVALUATION ONLY WITH [facility] 02/16/22. PATIENT REQUESTED THIS AND STATED SHE DOES NOT FEEL SHE IS ABLE TO PARTICIPATE     Do you require a callback: NO

## 2022-02-18 NOTE — TELEPHONE ENCOUNTER
Caller: Carrington Ledbetter    Relationship to patient: Self    Best call back number: [phone]    Patient is needing: PATIENT CALLED IN STATING SHE NEEDS AUTHORIZATION FOR THIS MEDICATION SENT INTO THE [facility]ARM[facility]Y.

## 2022-02-21 NOTE — TELEPHONE ENCOUNTER
"Spoke with MsKanika Khloe - she stated she has been taking both because \"her kidneys are so bad and just the lasix alone wasn't helping\"  "

## 2022-02-21 NOTE — TELEPHONE ENCOUNTER
Hesejal Ryann! Thanks for calling. Can we ask her if nephrology is aware of that change? Are they ok with that? I would ask them to please check with Dr. Chaney's office regarding that? Thank you!

## 2022-02-21 NOTE — TELEPHONE ENCOUNTER
Caller: APRIL     Relationship: [facility]     Best call back number: [phone]    What is the best time to reach you: ANYTIME    Who are you requesting to speak with (clinical staff, provider,  specific staff member): NURSE     What was the call regarding: PATIENT STATED TO [facility] THAT SHE FEELS SHE IS DOING WELL AND WOULD LIKE TO BE DISCHARGED NEXT WEEK.  [facility] IS CALLING TO LET DR. MARY KNOW AND SHE IF SHE WANTS TO GIVE THE O.K. TO GO AHEAD AND DISCHARGE HER.     Do you require a callback: YES

## 2022-02-24 PROBLEM — S72.001A CLOSED FRACTURE OF NECK OF RIGHT FEMUR (HCC): Status: ACTIVE | Noted: 2022-01-01

## 2022-02-25 NOTE — ANESTHESIA POSTPROCEDURE EVALUATION
Patient: Carrington Ledbetter    Procedure Summary     Date: 02/25/22 Room / Location: [facility] OR 01 / [facility] MAIN OR    Anesthesia Start: 1244 Anesthesia Stop:     Procedure: HIP BIPOLAR ANTERIOR (Right Hip) Diagnosis:       Closed fracture of neck of right femur, initial encounter ([facility])      (Closed fracture of neck of right femur, initial encounter ([facility]) [S72.001A])    Surgeons: Dustin Herbert MD Provider: Ziyad Ray MD    Anesthesia Type: general, spinal ASA Status: 4          Anesthesia Type: general, spinal    Vitals  Vitals Value Taken Time   /70 02/25/22 1514   Temp     Pulse 87 02/25/22 1517   Resp     SpO2 62 % 02/25/22 1508   Vitals shown include unvalidated device data.        Post Anesthesia Care and Evaluation    Patient location during evaluation: bedside  Anesthetic complications: anesthesia complication  PONV Status: noneHydration status: acceptable    Comments: An Anesthesiologist personally participated in the most demanding procedures (including induction and emergence if applicable) in the anesthesia plan, monitored the course of anesthesia administration at frequent intervals and remained physically present and available for immediate diagnosis and treatment of emergencies.       Upon arrival to PACU blood pressure dropped, epi given, no pulse detected and patient re-intubated, code called, Dr Ray gave chest compressions, pulse returned, Dr Salinas present and directing treatment.

## 2022-02-25 NOTE — TELEPHONE ENCOUNTER
Family called and wanted to let you know patient fell yesterday after dialysis. She had gotten dizzy and fell. She did fracture her femur and will be in hospital/rehab x 6 weeks approx.

## 2022-02-25 NOTE — ANESTHESIA PREPROCEDURE EVALUATION
Anesthesia Evaluation     Patient summary reviewed and Nursing notes reviewed   no history of anesthetic complications:  NPO Solid Status: > 8 hours  NPO Liquid Status: > 2 hours           Airway   Mallampati: I  TM distance: >3 FB  Neck ROM: full  No difficulty expected  Dental    (+) poor dentition, upper dentures and lower dentures    Pulmonary - normal exam    breath sounds clear to auscultation  (+) COPD,   Cardiovascular - normal exam  Exercise tolerance: good (4-7 METS)    Rhythm: regular  Rate: normal    (+) hypertension, hyperlipidemia,     ROS comment: · The left ventricular cavity is moderately dilated.  · Left ventricular wall thickness is consistent with mild concentric hypertrophy.  · Left ventricular ejection fraction appears to be 36 - 40%.        Neuro/Psych- negative ROS  GI/Hepatic/Renal/Endo    (+)  GERD,  renal disease, diabetes mellitus,     Musculoskeletal     Abdominal    Substance History - negative use     OB/GYN negative ob/gyn ROS         Other   arthritis,      ROS/Med Hx Other: EKG:     Rhythm: Sinus tachycardia  Rate: 103  Left bundle branch block  Intervals: Normal WI and QT interval  T-wave: T wave inversion in I and aVL, there is baseline artifact  ST Segment:, ST elevation V1, V2, V3, V4, V5 V6 secondary to interventricular conduction delay, no obvious pathological ST elevation or ST depression     EKG Comparison: EKG appears to be unchanged from EKG performed December 30, 2021                Anesthesia Plan    ASA 4     general   (Pt refuses spinal)  intravenous induction     Anesthetic plan, all risks, benefits, and alternatives have been provided, discussed and informed consent has been obtained with: patient.        CODE STATUS:    Level Of Support Discussed With: Patient  Code Status (Patient has no pulse and is not breathing): CPR (Attempt to Resuscitate)  Medical Interventions (Patient has pulse or is breathing): Full Support

## 2022-02-25 NOTE — ANESTHESIA POSTPROCEDURE EVALUATION
Patient: Carrington Ledbetter    Procedure Summary     Date: 02/25/22 Room / Location: [facility] OR 01 / [facility] MAIN OR    Anesthesia Start: 1244 Anesthesia Stop: 1545    Procedure: HIP BIPOLAR ANTERIOR (Right Hip) Diagnosis:       Closed fracture of neck of right femur, initial encounter ([facility])      (Closed fracture of neck of right femur, initial encounter ([facility]) [S72.001A])    Surgeons: Dustin Herbert MD Provider: Ziyad Ray MD    Anesthesia Type: general, spinal ASA Status: 4          Anesthesia Type: general, spinal    Vitals  Vitals Value Taken Time   /49 02/25/22 1545   Temp     Pulse 71 02/25/22 1556   Resp     SpO2 52 % 02/25/22 1530   Vitals shown include unvalidated device data.        Post Anesthesia Care and Evaluation    Anesthetic complications: anesthesia complication    Comments: Dr Herbert, Mulu Lopes and I spoke with Keith Madrigal about cardiac arrest in PACU and care and transfer to the ICU.  Answered all questions.  -pk

## 2022-02-27 LAB
BACTERIA SPEC RESP CULT: NORMAL
GRAM STN SPEC: NORMAL
QT INTERVAL: 367 MS
QT INTERVAL: 386 MS

## 2022-02-27 NOTE — ANESTHESIA POSTPROCEDURE EVALUATION
Patient: Burnmigel Ledbetter    Procedure Summary     Date: 02/25/22 Room / Location: [facility] OR 01 / [facility] MAIN OR    Anesthesia Start: 1244 Anesthesia Stop: 1545    Procedure: HIP BIPOLAR ANTERIOR (Right Hip) Diagnosis:       Closed fracture of neck of right femur, initial encounter ([facility])      (Closed fracture of neck of right femur, initial encounter ([facility]) [S72.001A])    Surgeons: Dustin Herbert MD Provider: Ziyad Ray MD    Anesthesia Type: general, spinal ASA Status: 4          Anesthesia Type: general, spinal    Vitals  Vitals Value Taken Time   /112 02/25/22 1539   Temp     Pulse 82 02/25/22 1540   Resp 18 02/25/22 1527   SpO2 52 % 02/25/22 1530   Vitals shown include unvalidated device data.        Anesthesia Post Evaluation

## 2022-02-27 NOTE — ANESTHESIA POSTPROCEDURE EVALUATION
Patient: Carrington Ledbetter    Procedure Summary     Date: 02/25/22 Room / Location: [facility] OR 01 / [facility] MAIN OR    Anesthesia Start: 1244 Anesthesia Stop: 1545    Procedure: HIP BIPOLAR ANTERIOR (Right Hip) Diagnosis:       Closed fracture of neck of right femur, initial encounter ([facility])      (Closed fracture of neck of right femur, initial encounter ([facility]) [S72.001A])    Surgeons: Dustin Herbert MD Provider: Ziyad Ray MD    Anesthesia Type: general, spinal ASA Status: 4          Anesthesia Type: general, spinal    Vitals  Vitals Value Taken Time   /112 02/25/22 1539   Temp     Pulse 82 02/25/22 1540   Resp 18 02/25/22 1527   SpO2 52 % 02/25/22 1530   Vitals shown include unvalidated device data.        Post Anesthesia Care and Evaluation      Comments: Called Cristal Madrigal to wish my condelescenses for the passing of her aunt.  She had no questions and was relieved that she was not suffering anymore.

## 2022-03-03 LAB — BACTERIA SPEC AEROBE CULT: NORMAL

## 2022-03-11 NOTE — DISCHARGE SUMMARY
[facility]         HOSPITALIST  DISCHARGE SUMMARY    Patient Name: Carrington Ledbetter  : 1925  MRN: [id number]    Date of Admission: 2022  Time of death: 2022 at 0931   primary Care Physician: Jeni Pope,     Consults     Date and Time Order Name Status Description    2022  8:49 PM Inpatient Nephrology Consult Completed     2022  8:48 PM Inpatient Orthopedic Surgery Consult Completed           Active and Resolved Hospital Problems:  Fall with R hip pain  R femoral neck fracture  ESRD (recently started HD; Dr. Chnaey)  PAF, Pacemaker  CHF / Cardiomyopathy EF 40s%  COPD (on nebs at home)  Chronic respiratory failure (on 2L at home)  Cardiac arrest  Hypotension following orthopedic surgery  Respiratory failure requiring intubation      Hospital Course     Hospital Course:  patient is a 89-year-old female who presented after having episode of syncope, she was evaluated and found to have a right femoral neck fracture.  Patient is known to have reduced ejection fraction is followed by Dr. Shultz, prior to surgery patient denies any chest pain, no shortness of breath, she was feeling her normal self.  She went to the OR for surgical correction of her hip fracture, following the operation she was hypotensive, had to be given several rounds of epi, patient underwent cardiac arrest with several rounds of chest compressions.  Patient was reintubated stabilized and transferred to the ICU.  Prior to patient's surgery conversation was had regarding the risk with her previous cardiac history, her advanced age it was also discussed that without surgery patient would likely never walk again.  Patient opted to proceed with surgery knowing the risk.  Unfortunately following the procedure patient was hypotensive, went into respiratory failure.  Patient was transferred to the ICU for close monitoring and care.  Patient continued to do poorly and family made the decision to transition to  comfort care.  Patient passed peacefully on 2022.    Day of Discharge     Discharge Details        Discharge Medications      ASK your doctor about these medications      Instructions Start Date   albuterol sulfate  (90 Base) MCG/ACT inhaler  Commonly known as: PROVENTIL HFA;VENTOLIN HFA;PROAIR HFA   2 puffs, Inhalation, Every 6 Hours PRN      aspirin 81 MG EC tablet   81 mg, Oral, Daily      calcitriol 0.25 MCG capsule  Commonly known as: ROCALTROL   0.25 mcg, Oral, Daily      carvedilol 6.25 MG tablet  Commonly known as: COREG   6.25 mg, Oral, 2 Times Daily With Meals, Take 2 (two) pills once a day      ferrous sulfate 325 (65 FE) MG tablet   325 mg, Oral, Daily With Breakfast      furosemide 20 MG tablet  Commonly known as: LASIX   20 mg, Oral, Daily      losartan 25 MG tablet  Commonly known as: COZAAR   25 mg, Oral, Daily      metOLazone 2.5 MG tablet  Commonly known as: ZAROXOLYN   2.5 mg, Oral      pantoprazole 40 MG EC tablet  Commonly known as: PROTONIX   40 mg, Oral, 2 Times Daily      Trelegy Ellipta 200-62.5-25 MCG/INH inhaler  Generic drug: Fluticasone-Umeclidin-Vilant   1 puff, Inhalation, Daily - RT, Rinse mouth out after each use.      Virt-Caps 1 MG capsule   1 capsule, Oral, Daily             No Known Allergies    Discharge Disposition:      Diet:  Hospital:No active diet order      Discharge Activity:       CODE STATUS:  Code Status and Medical Interventions:   Ordered at: 22 0553     Level Of Support Discussed With:    Next of Kin (If No Surrogate)     Code Status (Patient has no pulse and is not breathing):    No CPR (Do Not Attempt to Resuscitate)     Medical Interventions (Patient has pulse or is breathing):    Comfort Measures       No future appointments.        Pertinent  and/or Most Recent Results     IMAGING:  CT Abdomen Without Contrast    Result Date: 2022  PROCEDURE: CT ABDOMEN WO CONTRAST  COMPARISON: Chest CT examination, 2018.   INDICATIONS: acidosis; code blue; post-op. right hemiarthroplasty  TECHNIQUE: 540 CT images were created without intravenous or oral contrast agent administration.   PROTOCOL:   Standard CT imaging protocol performed.    RADIATION:   DLP: 512.7 mGy*cm.   Automated exposure control was utilized to minimize radiation dose.  FINDINGS: External densities in the scan field of view obscure detail.  Also, the patient's upper extremities in the scan field of view obscure detail.  No pneumothorax is appreciated within the visualized lower thorax.  No pneumoperitoneum is suggested.  Moderate cardiomegaly is seen.  There is a partially visualized left-sided cardiac implantable electronic device (CIED) in place.  Atherosclerotic changes are present, including involvement of the coronary arteries.  No aneurysmal dilatation of the aortoiliac arterial system is seen.  No acute intraperitoneal or retroperitoneal hemorrhage is seen.  There may be a small amount of ascites.  Small to moderate bilateral pleural effusions are seen.  Atelectasis and/or infiltrate(s) is (are) seen in the lung bases, including the bilateral lower lobes as well as the right middle lobe and lingula.  Pneumonia cannot be excluded, including aspiration pneumonia.  There is subcutaneous/intramuscular emphysema involving the right flank, including the partially imaged right-sided gluteal musculature, likely related to the recent right hip hemiarthroplasty.  There is an air-fluid level in the lower esophagus, which may be an indication of gastroesophageal reflux disease (GERD) or esophageal motility dysmotility.  A similar finding was seen previously.  There may be anasarca.  No hydronephrosis.  No nephrolithiasis.  No ureterolithiasis is seen.  The ureters are partially imaged.  There is a 3.1 cm anterior upper pole right renal cyst, which was seen previously, and is probably not significantly changed.  Degenerative changes are seen throughout the imaged spine.   There may be diffuse idiopathic skeletal hyperostosis (DISH).  Mild degenerative changes involve the bilateral sacroiliac joints.  No acute fracture.  No aggressive osseous lesion is suggested.  There is diffuse hepatic steatosis.  Probably no hepatosplenomegaly is seen.  There is chronic calcified granulomatous disease of the spleen.  Formed stool and fluid are seen throughout the imaged colon, especially the right colon.  The maximum transverse diameter of the cecum is about 8.2 cm.  This finding is nonspecific.  The entire extent of the bowel is not imaged, as only an abdomen CT exam was ordered and performed.  There are colonic diverticula.  No acute diverticulitis is appreciated.  The appendix is not clearly seen.  It may not be included in the field of view.  The gallbladder is not well seen.  Streak artifact obscures the pancreas.  Probably no acute pancreatitis.  Please correlate with pertinent lab values.  There is mild prominence of the bilateral adrenal glands, which may be artifactual.  Adrenal hyperplasia cannot be excluded.        1. The study is considerably degraded by motion artifact as well as external densities in the scan field of view, causing streak artifact.  2. Small to moderate bilateral pleural effusions are seen.  3. Atelectasis and/or infiltrate(s) is (are) seen in the imaged lung bases.  The findings may represent pneumonia, including aspiration pneumonia.  4. There is moderate cardiomegaly.  No pericardial effusion.  5. There is a partially imaged left-sided CIED in place.  6. There is an air-fluid level in the esophagus, which is nonspecific.  It may represent gastroesophageal reflux disease (GERD).  Esophageal dysmotility is possible.  A similar finding was seen previously.  7. No acute fracture is appreciated.  8. Subcutaneous and intramuscular emphysema involves the right gluteal region, related to the recent right hip hemiarthroplasty.  9. Extensive atherosclerotic change is seen,  including the coronary arteries.  No aneurysmal dilatation of the abdominal aorta is seen.  No acute intraperitoneal or retroperitoneal hemorrhage.  10. No hydronephrosis or obstructive uropathy.  There may be renal cortical scarring bilaterally.  11. Diffuse hepatic steatosis may be present.  Probably no hepatosplenomegaly.  12. Formed stool and fluid are seen throughout the colon.  Fecal stasis is possible.  A colonic ileus cannot be excluded.  A mechanical colonic obstruction (or small bowel obstruction) is probably less likely.  The bowel is incompletely imaged on the study, which is an abdomen CT exam only.  13. No pneumoperitoneum or pneumatosis.  No portal or mesenteric venous gas.  14. Minimal ascites is appreciated.  15. Please see above comments for further detail.   1.   ANGI NAIR JR, MD       Electronically Signed and Approved By: ANGI NAIR JR, MD on 2/26/2022 at 4:41             XR Femur 2 View Right    Result Date: 2/24/2022  PROCEDURE: XR FEMUR 2 VW RIGHT  COMPARISON: [facility], CR, XR HIP W OR WO PELVIS 2-3 VIEW RIGHT, 2/24/2022, 16:34.  INDICATIONS: GENERALIZED RIGHT FEMUR PAIN AFTER FALL TODAY  FINDINGS:  Deformity of the proximal right femur suggests subcapital fracture.  A cortical break is not evident.  Mild degenerative change consistent with osteoarthritis is seen in the medial and lateral compartments.  Moderate patellar chondromalacia is evident.  Extensive arterial vascular calcifications are evident.        Deformity of the proximal right femur suggesting subcapital fracture, as above.      MICHELLE CASE MD       Electronically Signed and Approved By: MICHELLE CASE MD on 2/24/2022 at 17:00             FL < 1 Hour    Result Date: 2/25/2022  This procedure was auto-finalized with no dictation required.    XR Chest 1 View    Result Date: 2/25/2022  PROCEDURE: XR CHEST 1 VW  COMPARISON: [facility], CR, XR CHEST 1 VW, 2/24/2022, 20:19.  INDICATIONS: ET  PLACEMENT  FINDINGS:  Endotracheal tube tip appears to terminate at the level of the aortic arch.  This is estimated to be approximately 2.5 cm above the esther.  Left IJ catheter with tip projecting in the superior right atrium, as before.  Pacemaker is present.  The heart is enlarged.  There is atherosclerosis of the aorta.  There is suspicion for a small left pleural effusion and trace right pleural effusion.  No definite pneumothorax.  There is suspicion for mild opacities in the lower lungs, left greater than right, and slightly decreased on the right.        1. Endotracheal tube tip is estimated to be approximately 2.5 cm above the esther. 2. Mild opacities in the lower lungs, left greater than right, and slightly decreased on the right. 3. Suspected small left and trace right pleural effusions. 4. Cardiomegaly.       JERRY SIM MD       Electronically Signed and Approved By: JERRY SIM MD on 2/25/2022 at 16:06             XR Chest 1 View    Result Date: 2/24/2022  PROCEDURE: XR CHEST 1 VW  COMPARISON: [facility], , XR CHEST 1 VW, 12/30/2021, 12:04.  INDICATIONS: cough/preop  FINDINGS:  The heart remains borderline in size.  The pulmonary vascularity does not appear congested.  Patchy bilateral airspace disease is unchanged.  Dual lead AICD is in unchanged position.  Left jugular central venous catheter tip is in the right atrium.  Moderate to marked degenerative change consistent with osteoarthritis is seen in the glenohumeral joints.        Borderline cardiomegaly, unchanged.  Patchy airspace disease is unchanged.  Dual lead AICD and left jugular central venous catheter in place.       MICHELLE CASE MD       Electronically Signed and Approved By: MICHELLE CASE MD on 2/24/2022 at 20:32             XR Hip With or Without Pelvis 2 - 3 View Right    Result Date: 2/25/2022  PROCEDURE: XR HIP W OR WO PELVIS 2-3 VIEW RIGHT  COMPARISON: [facility], MICHELET, XR HIP W OR WO PELVIS 2-3  VIEW RIGHT, 2/25/2022, 13:45.  INDICATIONS: Post-Op right Hip Arthroplasty.  FINDINGS:  Two views were obtained.  The patient has undergone right hip hemiarthroplasty.  There is near-anatomic alignment.  The right hip prosthetic hardware is intact.  No periprosthetic fractures are seen.  No acute fractures are seen elsewhere.  No dislocation.  There is suspected cement (such as methylmethacrylate) in place about the femoral component.  Please correlate with the Operative history.  Postoperative changes in the soft tissues are noted, as well, such as mild subcutaneous emphysema and edema.  Arterial calcifications are seen.  There are pelvic phleboliths.        The patient has undergone right hip hemiarthroplasty without radiographic evidence of postoperative complication, as discussed.     ANGI NAIR JR, MD       Electronically Signed and Approved By: ANGI NAIR JR, MD on 2/25/2022 at 22:42             XR Hip With or Without Pelvis 2 - 3 View Right    Result Date: 2/25/2022  PROCEDURE: XR HIP W OR WO PELVIS 2-3 VIEW RIGHT  COMPARISON: [facility], , XR HIP W OR WO PELVIS 2-3 VIEW RIGHT, 2/24/2022, 16:34.  INDICATIONS: RIGHT HIP FX/FLUORO TIME 8.6 SECONDS/0.659 mGy/3 IMAGES  FINDINGS:  Intraoperative fluoroscopy was provided for right hip hemiarthroplasty.  Total fluoroscopy time was 8.6 sec.  Intraoperative images demonstrate placement of right hip arthroplasty hardware.  No definite intraoperative complication is identified.        1. Intraoperative fluoroscopy for right hip hemiarthroplasty. 2. Please see operative report for details.      JERRY SIM MD       Electronically Signed and Approved By: JERRY SIM MD on 2/25/2022 at 14:46             XR Hip With or Without Pelvis 2 - 3 View Right    Result Date: 2/24/2022  PROCEDURE: XR HIP W OR WO PELVIS 2-3 VIEW RIGHT  COMPARISON: None  INDICATIONS: GENERALIZED RIGHT HIP PAIN AFTER FALL TODAY  FINDINGS:  Moderately displaced subcapital  fracture of the proximal right femur is evident.  The sacrum, pelvis, and proximal left femur appear intact.  Extensive arterial vascular calcifications are evident.        Moderately displaced subcapital fracture of the proximal right femur.      MICHELLE CASE MD       Electronically Signed and Approved By: MICHELLE CASE MD on 2/24/2022 at 16:56               LAB RESULTS:                              Brief Urine Lab Results  (Last result in the past 365 days)      Color   Clarity   Blood   Leuk Est   Nitrite   Protein   CREAT   Urine HCG        12/31/21 0627 Yellow   Clear   Small (1+)   Small (1+)   Negative   Negative               Microbiology Results (last 10 days)     ** No results found for the last 240 hours. **            Results for orders placed during the hospital encounter of 12/30/21    Adult Transthoracic Echo Complete W/ Cont if Necessary Per Protocol    Interpretation Summary  · The left ventricular cavity is moderately dilated.  · Left ventricular wall thickness is consistent with mild concentric hypertrophy.  · Left ventricular ejection fraction appears to be 36 - 40%.  · Left ventricular diastolic function is consistent with (grade II w/high LAP) pseudonormalization.  · The right ventricular cavity is mildly dilated.  · Mildly reduced right ventricular systolic function noted.  · The right atrial cavity is mildly dilated.  · Estimated right ventricular systolic pressure from tricuspid regurgitation is moderately elevated (45-55 mmHg).    Electronically signed by Saul Sharif MD, 03/11/22, 5:34 PM EST.

## 2024-10-11 NOTE — THERAPY EVALUATION
Acute Care - Physical Therapy Initial Evaluation   [facility]     Patient Name: Carrington Ledbetter  : 1925  MRN: [id number]  Today's Date: 1/3/2022      Visit Dx:     ICD-10-CM ICD-9-CM   1. Acute on chronic systolic congestive heart failure ([facility])  I50.23 428.23     428.0   2. Pharyngoesophageal dysphagia  R13.14 787.24   3. Difficulty walking  R26.2 719.7     Patient Active Problem List   Diagnosis   • Tobacco abuse, in remission   • Chronic respiratory failure with hypoxia ([facility])   • Chronic HFrEF (heart failure with reduced ejection fraction)   • History of Mycobacterium avium complex infection   • Bronchiectasis without complication ([facility])   • DM type 2 (diabetes mellitus, type 2) ([facility])   • Essential hypertension   • Allergic rhinitis due to other allergen   • Anemia in chronic kidney disease   • Chronic obstructive pulmonary disease ([facility])   • Gastro-esophageal reflux disease without esophagitis   • Gout   • Hemorrhoid   • Neuralgia and neuritis, unspecified   • Mixed hyperlipidemia   • Polyosteoarthritis   • Tinea unguium   • Vitamin D deficiency   • Presence of cardiac pacemaker dual chamber   • CRF (chronic renal failure), stage 4 (severe) ([facility])   • Acute on chronic HFrEF (heart failure with reduced ejection fraction) ([facility])   • Acute on chronic systolic congestive heart failure ([facility])     Past Medical History:   Diagnosis Date   • Arthritis    • CHF (congestive heart failure) ([facility])    • Chronic HFrEF (heart failure with reduced ejection fraction) 2021   • Dialysis patient ([facility])    • Difficulty walking 2019   • DM type 2 (diabetes mellitus, type 2) ([facility])    • ESRD (end stage renal disease) on dialysis ([facility]) 2021   • Essential hypertension    • Foot pain, bilateral 2018   • Hammer toe    • HBP (high blood pressure)    • Hyperlipidemia    • Kidney failure    • Oxygen dependent    • Oxygen desaturation during sleep      Past Surgical History:   Procedure Laterality Date   • APPENDECTOMY    Goal Outcome Evaluation:  Plan of Care Reviewed With: patient        Progress: improving  Outcome Evaluation: AOx4, VSS on RA. Patient had part one of surgery on her back the 10/9, the second part is 10/11 to remove old hardware. Dressing on the R- flank, CDI. The patient has been NPO since midnight. Turning self. Up to the BR with the aid of the family in the room. BG monitored, pt refused all insulin coverage this shift, due to surgery tomorrow. Paitent states she never takes insulin prior to surgery to prevent her blood sugar from bottoming out. CHG bath completed, SCDS on. Safety precautions maintained [facility] call light within reach.                                  • CHOLECYSTECTOMY     • HYSTERECTOMY     • INSERT / REPLACE / REMOVE PACEMAKER     • PACEMAKER IMPLANTATION       PT Assessment (last 12 hours)     PT Evaluation and Treatment     Row Name 01/03/22 1000          Physical Therapy Time and Intention    Subjective Information no complaints  -DP     Document Type evaluation  -DP     Mode of Treatment individual therapy; physical therapy  -DP     Patient Effort excellent  -DP     Row Name 01/03/22 1000          General Information    Patient Profile Reviewed yes  -DP     Patient Observations alert; cooperative; agree to therapy  -DP     Prior Level of Function independent:; gait; transfer; ADL's; bed mobility  -DP     Equipment Currently Used at Home walker, rolling  -DP     Existing Precautions/Restrictions no known precautions/restrictions  -DP     Row Name 01/03/22 1000          Living Environment    Current Living Arrangements home/apartment/condo  -DP     Home Accessibility stairs to enter home  -DP     Lives With other relative(s)  sister in law  -DP     Row Name 01/03/22 1000          Home Main Entrance    Number of Stairs, Main Entrance five  -DP     Row Name 01/03/22 1000          Range of Motion (ROM)    Range of Motion bilateral lower extremities; ROM is WFL  -DP     Row Name 01/03/22 1000          Strength (Manual Muscle Testing)    Strength (Manual Muscle Testing) bilateral lower extremities; strength is WFL  -DP     Row Name 01/03/22 1000          Bed Mobility    Bed Mobility supine-sit-supine  -DP     Supine-Sit-Supine [place] (Bed Mobility) supervision  -DP     Row Name 01/03/22 1000          Transfers    Transfers sit-stand transfer  -DP     Sit-Stand [place] (Transfers) supervision  -DP     Row Name 01/03/22 1000          Gait/Stairs (Locomotion)    Gait/Stairs Locomotion gait/ambulation assistive device  -DP     [place] Level (Gait) standby assist  -DP     Assistive Device (Gait) walker, front-wheeled  -DP     Distance in Feet (Gait)  20  -DP     Row Name 01/03/22 1000          Balance    Balance Assessment standing dynamic balance  -DP     Dynamic Standing Balance WFL  -DP     Row Name 01/03/22 1000          Plan of Care Review    Plan of Care Reviewed With patient  -DP     Outcome Summary Patient is able to complete all transfers with standby assistance.  She is able to ambulate with a rolling walker and standby assistance.  Patient is being discharged home later today and reports that her family will be able to assist her upon return home.  She does not need inpatient PT services at this point in time.  -DP     Row Name 01/03/22 1000          PT Evaluation Complexity    History, PT Evaluation Complexity no personal factors and/or comorbidities  -DP     Examination of Body Systems (PT Eval Complexity) total of 4 or more elements  -DP     Clinical Presentation (PT Evaluation Complexity) stable  -DP     Clinical Decision Making (PT Evaluation Complexity) low complexity  -DP     Overall Complexity (PT Evaluation Complexity) low complexity  -DP           User Key  (r) = Recorded By, (t) = Taken By, (c) = Cosigned By    Initials Name Provider Type    Dipesh Castro, PT Physical Therapist                  PT Recommendation and Plan  Anticipated Discharge Disposition (PT): home with assist  Therapy Frequency (PT): evaluation only  Plan of Care Reviewed With: patient  Outcome Summary: Patient is able to complete all transfers with standby assistance.  She is able to ambulate with a rolling walker and standby assistance.  Patient is being discharged home later today and reports that her family will be able to assist her upon return home.  She does not need inpatient PT services at this point in time.   Outcome Measures     Row Name 01/03/22 1100             How much help from another person do you currently need...    Turning from your back to your side while in flat bed without using bedrails? 4  -DP      Moving from lying on back to sitting on the  side of a flat bed without bedrails? 4  -DP      Moving to and from a bed to a chair (including a wheelchair)? 4  -DP      Standing up from a chair using your arms (e.g., wheelchair, bedside chair)? 4  -DP      Climbing 3-5 steps with a railing? 3  -DP      To walk in hospital room? 3  -DP      AM-PAC 6 Clicks Score (PT) 22  -DP              Functional Assessment    Outcome Measure Options AM-PAC 6 Clicks Basic Mobility (PT)  -DP            User Key  (r) = Recorded By, (t) = Taken By, (c) = Cosigned By    Initials Name Provider Type    Dipesh Castro, PT Physical Therapist                 Time Calculation:    PT Charges     Row Name 01/03/22 1144             Time Calculation    PT Received On 01/03/22  -DP              Untimed Charges    PT Eval/Re-eval Minutes 40  -DP              Total Minutes    Untimed Charges Total Minutes 40  -DP       Total Minutes 40  -DP            User Key  (r) = Recorded By, (t) = Taken By, (c) = Cosigned By    Initials Name Provider Type    Dipesh Castro, PT Physical Therapist              Therapy Charges for Today     Code Description Service Date Service Provider Modifiers Qty    [id number] HC PT EVAL LOW COMPLEXITY 3 1/3/2022 Dipesh Richter, PT GP 1          PT G-Codes  Outcome Measure Options: AM-PAC 6 Clicks Basic Mobility (PT)  AM-PAC 6 Clicks Score (PT): 22    Dipesh Richter, PT  1/3/2022

## (undated) DEVICE — ELECTRD BLD EXT EDGE 1P COAT 6.5IN STRL

## (undated) DEVICE — KT PT POSITION SUPINE HANA/PROFX TABL

## (undated) DEVICE — PULLOVER TOGA, 2X LARGE: Brand: FLYTE, SURGICOOL

## (undated) DEVICE — 1016 S-DRAPE IRRIG POUCH 10/BOX: Brand: STERI-DRAPE™

## (undated) DEVICE — GLV SURG SENSICARE ORTHO PF LF 8 STRL

## (undated) DEVICE — SOL IRR NACL 0.9PCT BT 1000ML

## (undated) DEVICE — MAT FLR ABS W/BLU/LINER 56X72IN WHT

## (undated) DEVICE — ZIPPERED TOGA, PEEL-AWAY 2X LARGE: Brand: FLYTE, SURGICOOL

## (undated) DEVICE — UNDYED BRAIDED (POLYGLACTIN 910), SYNTHETIC ABSORBABLE SUTURE: Brand: COATED VICRYL

## (undated) DEVICE — STRYKER PERFORMANCE SERIES SAGITTAL BLADE: Brand: STRYKER PERFORMANCE SERIES

## (undated) DEVICE — Device: Brand: COVER, PERINEAL POST, 12 PK

## (undated) DEVICE — PENCL E/S SMOKEEVAC W/TELESCP CANN

## (undated) DEVICE — Device: Brand: POWER-FLO®

## (undated) DEVICE — 1010 S-DRAPE TOWEL DRAPE 10/BX: Brand: STERI-DRAPE™

## (undated) DEVICE — 450 ML BOTTLE OF 0.05% CHLORHEXIDINE GLUCONATE IN 99.95% STERILE WATER FOR IRRIGATION, USP AND APPLICATOR.: Brand: IRRISEPT ANTIMICROBIAL WOUND LAVAGE

## (undated) DEVICE — SLV SCD KN/LEN ADJ EXPRSS BLENDED MD 1P/U

## (undated) DEVICE — TOWEL,OR,DSP,ST,BLUE,STD,4/PK,20PK/CS: Brand: MEDLINE

## (undated) DEVICE — SUT POLY FORCEFIBER W/CUT/NDL NO5 38IN

## (undated) DEVICE — APPL CHLORAPREP HI/LITE 26ML ORNG

## (undated) DEVICE — CVR LEG BOOTLEG F/R NOSKID 33IN

## (undated) DEVICE — SUT VIC UD BR COAT 0 CP2 27IN

## (undated) DEVICE — PROXIMATE RH ROTATING HEAD SKIN STAPLERS (35 WIDE) CONTAINS 35 STAINLESS STEEL STAPLES: Brand: PROXIMATE

## (undated) DEVICE — GAUZE,SPONGE,4"X4",16PLY,STRL,LF,10/TRAY: Brand: MEDLINE

## (undated) DEVICE — TOTAL ANTERIOR HIP-LF: Brand: MEDLINE INDUSTRIES, INC.

## (undated) DEVICE — 3M™ STERI-DRAPE™ INSTRUMENT POUCH 1018: Brand: STERI-DRAPE™

## (undated) DEVICE — 3M™ IOBAN™ 2 ANTIMICROBIAL INCISE DRAPE 6651EZ: Brand: IOBAN™ 2

## (undated) DEVICE — GLV SURG SENSICARE SLT PF LF 8 STRL

## (undated) DEVICE — DRSNG SURG AQUACEL AG 9X25CM

## (undated) DEVICE — BIPOLAR SEALER 23-112-1 AQM 6.0: Brand: AQUAMANTYS™